# Patient Record
Sex: FEMALE | Race: WHITE
[De-identification: names, ages, dates, MRNs, and addresses within clinical notes are randomized per-mention and may not be internally consistent; named-entity substitution may affect disease eponyms.]

---

## 2017-03-25 ENCOUNTER — HOSPITAL ENCOUNTER (EMERGENCY)
Dept: HOSPITAL 60 - LB.ED | Age: 52
Discharge: HOME | End: 2017-03-25
Payer: MEDICARE

## 2017-03-25 VITALS — DIASTOLIC BLOOD PRESSURE: 87 MMHG | SYSTOLIC BLOOD PRESSURE: 151 MMHG

## 2017-03-25 DIAGNOSIS — Z90.710: ICD-10-CM

## 2017-03-25 DIAGNOSIS — Z87.440: ICD-10-CM

## 2017-03-25 DIAGNOSIS — Z88.8: ICD-10-CM

## 2017-03-25 DIAGNOSIS — J01.90: Primary | ICD-10-CM

## 2017-03-25 DIAGNOSIS — F32.9: ICD-10-CM

## 2017-03-25 DIAGNOSIS — Z90.49: ICD-10-CM

## 2017-03-25 DIAGNOSIS — H66.91: ICD-10-CM

## 2017-03-25 DIAGNOSIS — Z87.01: ICD-10-CM

## 2017-03-25 DIAGNOSIS — Z88.6: ICD-10-CM

## 2017-03-25 DIAGNOSIS — F41.9: ICD-10-CM

## 2017-03-25 DIAGNOSIS — J45.901: ICD-10-CM

## 2017-03-25 DIAGNOSIS — F17.210: ICD-10-CM

## 2017-03-25 PROCEDURE — 99283 EMERGENCY DEPT VISIT LOW MDM: CPT

## 2017-03-25 NOTE — EDM.PDOC
ED HPI GENERAL MEDICAL PROBLEM





- General


Stated Complaint: chest congestion


Time Seen by Provider: 17 12:00


Source of Information: Reports: Patient


History Limitations: Reports: No limitations





- History of Present Illness


INITIAL COMMENTS - FREE TEXT/NARRATIVE: 


Pt claims that she has been having nasal congestion and sinus drainage for past 

1 wk. Cough has been getting worse. No fever or chills. Sputum is greenish 

yellow and copious. Hurts in the face. Also she has been complaining of earache 

since yesterday.  No wheezing or shortness of breath. No nausea or vomiting.





Improves with: Reports: None


Worsens with: Reports: None


Associated Symptoms: Reports: cough.  Denies: confusion, chest pain, diaphoresis

, fever/chills, headaches, nausea/vomiting, rash, shortness of breath, syncope, 

weakness


  ** Bilateral Head


Pain Score (Numeric/FACES): 6





- Related Data


 Allergies











Allergy/AdvReac Type Severity Reaction Status Date / Time


 


acetaminophen [From Percocet] Allergy  Numbness Verified 16 17:01


 


adhesive tape Allergy  Rash Verified 16 17:01


 


ibuprofen Allergy  Stomach Verified 16 17:01





   Upset  


 


ketorolac tromethamine Allergy  Nausea and Verified 16 17:01





[From Toradol]   Vomiting  


 


oxycodone HCl [From Percocet] Allergy  Numbness Verified 16 17:01


 


celecoxib [From Celebrex] AdvReac Mild Indigestion Verified 16 17:01











Home Meds: 


 Home Meds





Ipratropium/Albuterol Sulfate [Duoneb 0.5 MG-3 MG/3 ML] 1 - 2 puff INH Q4H PRN 

13 [History]


Omeprazole 20 mg PO BID 13 [History]


risperiDONE [RisperiDAL M-Tab] 3 mg PO DAILY 13 [History]


traZODone 1 tab PO BEDTIME 13 [History]


Acetaminophen [Tylenol Extra Strength] 500 mg PO Q6H 11/21/15 [History]


Budesonide/Formoterol [Symbicort 160-4.5 MCG] 2 puff INH BID 11/21/15 [History]


Colestipol [Colestipol HCl] 1 - 2 tab PO BID PRN 11/21/15 [History]


Gabapentin [Neurontin] 1 tab PO BID 11/21/15 [History]


Nicotine [Habitrol] 1 patch TOP DAILY 11/21/15 [History]


OXcarbazepine [Trileptal] 900 mg PO BID 11/21/15 [History]


QUEtiapine [SEROquel] 200 tab PO BID 11/21/15 [History]


SUMAtriptan [Imitrex] 1 tab PO ASDIRECTED PRN 11/21/15 [History]


Spironolactone [Aldactone] 3 tab PO BID 11/21/15 [History]


Topiramate 1 tab PO DAILY 11/21/15 [History]


buPROPion [Wellbutrin SR] 100 mg PO BID 11/21/15 [History]


hydrOXYzine HCl [Atarax] 1 - 2 cap PO ASDIRECTED PRN 11/21/15 [History]


rOPINIRole [Requip] 1 tab PO BEDTIME 11/21/15 [History]


tiZANidine [Zanaflex] 4 mg PO Q6H 16 [History]


Zolpidem Tartrate [Ambien Cr] 2 tab PO DAILY 17 [History]











Past Medical History


HEENT History: Reports: Impaired vision, Other (see below)


Other HEENT History: glasses for reading,


Respiratory History: Reports: Asthma, Bronchitis, recurrent, Pneumonia, 

recurrent, Sleep apnea, Other (see below)


Other Respiratory History: Emphysema


Gastrointestinal History: Reports: Irritable bowel syndrome


Genitourinary History: Reports: UTI, recurrent


OB/GYN History: Reports: Dysfunctional uterine bleeding, Pregnancy


Musculoskeletal History: Reports: Back pain, chronic, Other (see below)


Other Musculoskeletal History: back / butt pain, shoots down left leg


Neurological History: Reports: Migraines


Psychiatric History: Reports: Anxiety, Depression, Suicide attempt, Suicidal 

ideation


Hematologic History: Reports: Blood transfusion(s), Iron deficiency


Dermatologic History: Reports: Eczema, Other (see below)


Other Dermatologic History: Allergy to paper tape





- Infectious Disease History


Infectious Disease History: Reports: Chicken pox, Measles, Rubella





- Past Surgical History


HEENT Surgical History: Reports: Other (see below)


Other HEENT Surgeries/Procedures: Eye Surgery, when 7 yr and grade school


GI Surgical History: Reports: Appendectomy, Cholecystectomy, Hernia, abdominal, 

Other (see below)


Other GI Surgeries/Procedures: ABD. SURGERY AND SCAR TISSUE REMOVAL


Female  Surgical History: Reports:  section, Hysterectomy


Other Female  Surgeries/Procedures: 3 





Social & Family History





- Family History


Family Medical History: Noncontributory





- Tobacco Use


Smoking Status *Q: Current Every Day Smoker


Years of Tobacco use: 35


Packs/Tins Daily: 1


Used Tobacco, but Quit: No


Second Hand Smoke Exposure: Yes





- Caffeine Use


Caffeine Use: Reports: Coffee, Tea





- Alcohol Use


Days Per Week of Alcohol Use: 0





- Recreational Drug Use


Recreational Drug Use: No





ED ROS GENERAL





- Review of Systems


Review Of Systems: See Below


Constitutional: Denies: fever, chills


HEENT: Reports: Ear pain, Rhinitis, Sinus problem, Throat pain.  Denies: 

Contact Lenses, Dental pain, Glasses, Hearing loss


Respiratory: Reports: Cough, Sputum.  Denies: Shortness of Breath


Cardiovascular: Denies: Chest pain, Lightheadedness


GI/Abdominal: Denies: Abdominal pain, Nausea, Vomiting


: Denies: discharge, dysuria, flank pain, frequency


Musculoskeletal: Denies: shoulder pain, joint pain, joint swelling


Skin: Denies: pruritis, rash


Neurological: Denies: Confusion, Dizziness, Difficulty Walking, Weakness


Psychiatric: Denies: Agitation, Anxiety





ED EXAM, GENERAL





- Physical Exam


Exam: See Below


Exam Limited By: No limitations


General Appearance: alert, WD/WN, no apparent distress


Eye Exam: bilateral eye: EOMI, PERRL


Ears: normal external exam, normal canal, hearing grossly normal, normal TMs


Ear Exam: right ear: erythema, bilateral ear: auricle normal, canal normal, TM 

bulging


Nose: normal inspection, normal mucosa, no blood


Throat/Mouth: Normal inspection, Normal lips, Normal teeth, Normal gums, Normal 

oropharynx, Normal voice, No airway compromise


Head: atraumatic, normocephalic, sinus tenderness (maxillary tenderness B/L)


Neck: normal inspection, supple, non-tender, full range of motion


Respiratory/Chest: no respiratory distress, lungs clear, normal breath sounds, 

no accessory muscle use, chest non-tender


Cardiovascular: normal peripheral pulses, regular rate, rhythm, no edema, no 

gallop, no JVD, no murmur, no rub





Course





- Vital Signs


Text/Narrative:: 


Pt has acute maxillary sinusitis with right middle ear infection. As her 

symptoms have been going on for 1 wk, I have started her on amox 500mg 3 times 

daily for 10 days. Advised to stop smoking. Steam inhalations 2-3 times daily. 

rest and hydration. 





Followup with primary provider in clinic if symptoms worsen.





Last Recorded V/S: 





 Last Vital Signs











Temp  98.9 F   17 12:13


 


Pulse  92   17 12:13


 


Resp      


 


BP  151/87 H  17 12:13


 


Pulse Ox      














Departure





- Departure


Time of Disposition: 12:30


Disposition: Home, Self-Care 01


Condition: good


Clinical Impression: 


 Acute sinusitis, Right otitis media





Instructions:  Sinusitis, Adult, Sinus Headache


Referrals: 


PCP,None [Primary Care Provider] - 


Care Plan Goals: 


Take antibiotic as prescribed, return to clinic if symptoms persist.





- Problem List & Annotations


(1) Acute sinusitis


SNOMED Code(s): 76773426


   Code(s): J01.90 - ACUTE SINUSITIS, UNSPECIFIED   Status: Acute   





(2) Right otitis media


SNOMED Code(s): 56414699


   Code(s): H66.91 - OTITIS MEDIA, UNSPECIFIED, RIGHT EAR   Status: Acute   





- Problem List Review


Problem List Initiated/Reviewed/Updated: Yes





- Assessment/Plan


Assessment:: 


Acute sinusitis with right OM





Plan: 


Pt has acute maxillary sinusitis with right middle ear infection. As her 

symptoms have been going on for 1 wk, I have started her on amox 500mg 3 times 

daily for 10 days. Advised to stop smoking. Steam inhalations 2-3 times daily. 

rest and hydration. 





Followup with primary provider in clinic if symptoms worsen.

## 2017-05-20 ENCOUNTER — HOSPITAL ENCOUNTER (EMERGENCY)
Dept: HOSPITAL 60 - LB.ED | Age: 52
Discharge: HOME | End: 2017-05-20
Payer: MEDICARE

## 2017-05-20 VITALS — SYSTOLIC BLOOD PRESSURE: 172 MMHG | DIASTOLIC BLOOD PRESSURE: 84 MMHG

## 2017-05-20 DIAGNOSIS — Z90.710: ICD-10-CM

## 2017-05-20 DIAGNOSIS — F17.210: ICD-10-CM

## 2017-05-20 DIAGNOSIS — F32.9: ICD-10-CM

## 2017-05-20 DIAGNOSIS — Z90.49: ICD-10-CM

## 2017-05-20 DIAGNOSIS — Z91.09: ICD-10-CM

## 2017-05-20 DIAGNOSIS — Z79.899: ICD-10-CM

## 2017-05-20 DIAGNOSIS — Z98.890: ICD-10-CM

## 2017-05-20 DIAGNOSIS — J45.909: ICD-10-CM

## 2017-05-20 DIAGNOSIS — K08.89: Primary | ICD-10-CM

## 2017-05-20 DIAGNOSIS — Z88.5: ICD-10-CM

## 2017-05-20 DIAGNOSIS — F41.9: ICD-10-CM

## 2017-05-20 DIAGNOSIS — Z88.8: ICD-10-CM

## 2017-05-20 PROCEDURE — 99283 EMERGENCY DEPT VISIT LOW MDM: CPT

## 2017-05-20 PROCEDURE — 36415 COLL VENOUS BLD VENIPUNCTURE: CPT

## 2017-05-20 PROCEDURE — 85025 COMPLETE CBC W/AUTO DIFF WBC: CPT

## 2017-05-20 PROCEDURE — 99282 EMERGENCY DEPT VISIT SF MDM: CPT

## 2017-05-20 NOTE — EDM.PDOC
ED HPI GENERAL MEDICAL PROBLEM





- General


Chief Complaint: General


Stated Complaint: MOUTH PAIN


Time Seen by Provider: 17 21:00


Source of Information: Reports: Patient


History Limitations: Reports: No Limitations





- History of Present Illness


INITIAL COMMENTS - FREE TEXT/NARRATIVE: 


According to patient she has been having pain in her lower jaw teeh for past 3-

4 days now. She did see  in the clinic and was started on augmentin. Pt 

claims she has been using orogel , but her pain has got worse. Rates at 8-9/10. 

No fever or chills. No facial swelling. Pt claims she is taking 2 tablets of 

tylenol every 6 hrs and it is not helping. Pt claims she is allergic to 

ibuprofen, toradol and  percoet, but not to vicodin. 





Onset Date: 17


Location: Reports: Other (tooth)


Quality: Reports: Ache


Severity: Severe


Improves with: Reports: None


Worsens with: Reports: None


Context: Reports: Trauma


Associated Symptoms: Denies: Chest Pain, Cough, Diaphoresis, Fever/Chills, 

Nausea/Vomiting, Rash, Syncope


  ** Lower Gums


Pain Score (Numeric/FACES): 10





- Related Data


 Allergies











Allergy/AdvReac Type Severity Reaction Status Date / Time


 


adhesive tape Allergy  Rash Verified 17 21:33


 


ibuprofen Allergy  Stomach Verified 17 21:33





   Upset  


 


ketorolac tromethamine Allergy  Nausea and Verified 17 21:33





[From Toradol]   Vomiting  


 


oxycodone HCl [From Percocet] Allergy  Numbness Verified 17 21:33


 


celecoxib [From Celebrex] AdvReac Mild Indigestion Verified 17 21:33











Home Meds: 


 Home Meds





Ipratropium/Albuterol Sulfate [Duoneb 0.5 MG-3 MG/3 ML] 1 - 2 puff INH Q4H PRN 

13 [History]


Omeprazole 20 mg PO BID 13 [History]


risperiDONE [RisperiDAL M-Tab] 3 mg PO DAILY 13 [History]


traZODone 1 tab PO BEDTIME 13 [History]


Acetaminophen [Tylenol Extra Strength] 500 mg PO Q6H 11/21/15 [History]


Budesonide/Formoterol [Symbicort 160-4.5 MCG] 2 puff INH BID 11/21/15 [History]


Colestipol [Colestipol HCl] 1 - 2 tab PO BID PRN 11/21/15 [History]


Gabapentin [Neurontin] 1 tab PO BID 11/21/15 [History]


Nicotine [Habitrol] 1 patch TOP DAILY 11/21/15 [History]


OXcarbazepine [Trileptal] 900 mg PO BID 11/21/15 [History]


QUEtiapine [SEROquel] 200 tab PO BID 11/21/15 [History]


SUMAtriptan [Imitrex] 1 tab PO ASDIRECTED PRN 11/21/15 [History]


Spironolactone [Aldactone] 3 tab PO BID 11/21/15 [History]


Topiramate 1 tab PO DAILY 11/21/15 [History]


buPROPion [Wellbutrin SR] 100 mg PO BID 11/21/15 [History]


hydrOXYzine HCl [Atarax] 1 - 2 cap PO ASDIRECTED PRN 11/21/15 [History]


rOPINIRole [Requip] 1 tab PO BEDTIME 11/21/15 [History]


tiZANidine [Zanaflex] 4 mg PO Q6H 16 [History]


Zolpidem Tartrate [Ambien Cr] 2 tab PO DAILY 17 [History]











Past Medical History


HEENT History: Reports: Impaired Vision, Other (See Below)


Other HEENT History: glasses for reading,


Respiratory History: Reports: Asthma, Bronchitis, Recurrent, Pneumonia, 

Recurrent, Sleep Apnea, Other (See Below)


Other Respiratory History: Emphysema


Gastrointestinal History: Reports: Irritable Bowel Syndrome


Genitourinary History: Reports: UTI, Recurrent


OB/GYN History: Reports: Dysfunctional Uterine Bleeding, Pregnancy


Musculoskeletal History: Reports: Back Pain, Chronic, Other (See Below)


Other Musculoskeletal History: back / butt pain, shoots down left leg


Neurological History: Reports: Migraines


Psychiatric History: Reports: Anxiety, Depression, Suicide Attempt, Suicidal 

Ideation


Hematologic History: Reports: Blood Transfusion(s), Iron Deficiency


Dermatologic History: Reports: Eczema, Other (See Below)


Other Dermatologic History: Allergy to paper tape





- Infectious Disease History


Infectious Disease History: Reports: Chicken Pox, Measles, Rubella





- Past Surgical History


HEENT Surgical History: Reports: Other (See Below)


GI Surgical History: Reports: Appendectomy, Cholecystectomy, Hernia, Abdominal, 

Other (See Below)


Female  Surgical History: Reports:  Section, Hysterectomy





Social & Family History





- Family History


Family Medical History: Noncontributory





- Tobacco Use


Smoking Status *Q: Current Every Day Smoker


Years of Tobacco use: 35


Packs/Tins Daily: 1


Used Tobacco, but Quit: No


Second Hand Smoke Exposure: Yes





- Caffeine Use


Caffeine Use: Reports: Coffee, Tea





- Alcohol Use


Days Per Week of Alcohol Use: 0





- Recreational Drug Use


Recreational Drug Use: No





ED ROS GENERAL





- Review of Systems


Review Of Systems: See Below


Constitutional: Denies: Fever, Chills


HEENT: Denies: Ear Pain, Rhinitis, Sinus Problem, Throat Pain, Throat Swelling, 

Vertigo


Respiratory: Denies: Shortness of Breath, Cough, Sputum


Cardiovascular: Denies: Chest Pain, Edema, Lightheadedness


GI/Abdominal: Denies: Abdominal Pain, Nausea, Vomiting


: Denies: Dysuria, Flank Pain


Musculoskeletal: Denies: Joint Pain, Joint Swelling





ED EXAM, GENERAL





- Physical Exam


Exam: See Below


Exam Limited By: No Limitations


General Appearance: Alert, WD/WN, No Apparent Distress, Mild Distress


Eye Exam: Bilateral Eye: EOMI, PERRL


Ears: Normal External Exam, Normal Canal, Hearing Grossly Normal, Normal TMs


Ear Exam: Bilateral Ear: Auricle Normal, Canal Normal, TM normal


Nose: Normal Inspection, Normal Mucosa, No Blood


Throat/Mouth: Normal Lips, Normal Gums, Normal Oropharynx, Other (pt has 

several teeth lost in all 4 quadrants of the jaw. She does have few caries 

tooth with rilling in the right lower jaw. tender to tapping. Gums appears 

normal. Tender over the right mandible to pressure. No abscess felt or seen.)


Respiratory/Chest: No Respiratory Distress, Lungs Clear, Normal Breath Sounds, 

No Accessory Muscle Use, Chest Non-Tender


Cardiovascular: Normal Peripheral Pulses, Regular Rate, Rhythm, No Edema, No 

Gallop, No JVD, No Murmur, No Rub





Course





- Vital Signs


Text/Narrative:: 


CBC shows whit count of 6.1. I really do not see any source of infection. With 

all the caries tooth, she might have nerve exposure causing her pain. I have 

advised patient to continue antibiotics. Also advised to alternate tylenol with 

Vicodin 5/325 every 4 hrs. Given 6 tqabs fo Vicodin today. Advised lysterine 

gargles 3-4 times daily. Advised to call her dentist office and monday for 

recheck.





Last Recorded V/S: 


 Last Vital Signs











Temp  97.2 F   17 20:40


 


Pulse  91   17 20:40


 


Resp  18   17 20:40


 


BP  172/84 H  17 20:40


 


Pulse Ox  99   17 20:40














- Orders/Labs/Meds


Labs: 


 Laboratory Tests











  17 Range/Units





  21:25 


 


WBC  6.1  (4.0-11.0)  K/uL


 


RBC  4.84  (3.80-5.80)  M/uL


 


Hgb  13.1  (11.5-16.5)  g/dL


 


Hct  39.7  (37.0-47.0)  %


 


MCV  82  (76-96)  fL


 


MCH  27.1  (27.0-32.0)  pg


 


MCHC  33.0  (31.0-35.0)  g/dL


 


RDW  14.8  (11.0-16.0)  %


 


Plt Count  144 L  (150-500)  K/uL


 


MPV  11.5 H  (6.0-10.0)  fL


 


Neut % (Auto)  40.5 L  (45.0-70.0)  %


 


Lymph % (Auto)  50.5 H  (20.0-40.0)  %


 


Mono % (Auto)  7.3  (3.0-10.0)  %


 


Eos % (Auto)  1.0  (1.0-5.0)  %


 


Baso % (Auto)  0.7 H  (0.0-0.5)  %


 


Neut # (Auto)  2.46  (2.00-7.50)  K/uL


 


Lymph # (Auto)  3.06  (1.50-4.00)  K/uL


 


Mono # (Auto)  0.44  (0.20-0.80)  K/uL


 


Eos # (Auto)  0.06  (0.04-0.40)  K/uL


 


Baso # (Auto)  0.04  (0.02-0.10)  K/uL











Meds: 


Medications














Discontinued Medications














Generic Name Dose Route Start Last Admin





  Trade Name Freq  PRN Reason Stop Dose Admin


 


Hydrocodone Bitart/Acetaminophen  Confirm  17 21:17  





  Norco 325-10 Mg  Administered  17 21:18  





  Dose   





  6 tab   





  .ROUTE   





  .STK-MED ONE   














Departure





- Departure


Time of Disposition: 21:50


Disposition: Home, Self-Care 01


Condition: fair


Clinical Impression: 


 Pain of tooth socket








- Discharge Information


Instructions:  Dental Abscess, Easy-to-Read


Referrals: 


PCP,None [Primary Care Provider] - 


Forms:  ED Department Discharge





- Problem List & Annotations


(1) Pain of tooth socket


SNOMED Code(s): 141937895


   Code(s): K08.89 - OTHER SPECIFIED DISORDERS OF TEETH AND SUPPORTING 

STRUCTURES   Status: Acute   Current Visit: Yes   





- Problem List Review


Problem List Initiated/Reviewed/Updated: Yes





- Assessment/Plan


Assessment:: 


Acute tooth infection with pain





Plan: 


CBC shows whit count of 6.1. I really do not see any source of infection. With 

all the caries tooth, she might have nerve exposure causing her pain. I have 

advised patient to continue antibiotics. Also advised to alternate tylenol with 

Vicodin 5/325 every 4 hrs. Given 6 tqabs fo Vicodin today. Advised lysterine 

gargles 3-4 times daily. Advised to call her dentist office and monday for 

recheck.

## 2017-05-22 ENCOUNTER — HOSPITAL ENCOUNTER (EMERGENCY)
Dept: HOSPITAL 60 - LB.ED | Age: 52
Discharge: HOME | End: 2017-05-22
Payer: MEDICARE

## 2017-05-22 VITALS — SYSTOLIC BLOOD PRESSURE: 124 MMHG | DIASTOLIC BLOOD PRESSURE: 77 MMHG

## 2017-05-22 DIAGNOSIS — F32.9: ICD-10-CM

## 2017-05-22 DIAGNOSIS — Z90.49: ICD-10-CM

## 2017-05-22 DIAGNOSIS — G43.909: ICD-10-CM

## 2017-05-22 DIAGNOSIS — J45.909: ICD-10-CM

## 2017-05-22 DIAGNOSIS — Z88.8: ICD-10-CM

## 2017-05-22 DIAGNOSIS — E66.9: ICD-10-CM

## 2017-05-22 DIAGNOSIS — E11.9: ICD-10-CM

## 2017-05-22 DIAGNOSIS — K13.79: Primary | ICD-10-CM

## 2017-05-22 DIAGNOSIS — J43.9: ICD-10-CM

## 2017-05-22 DIAGNOSIS — F41.9: ICD-10-CM

## 2017-05-22 DIAGNOSIS — Z79.899: ICD-10-CM

## 2017-05-22 DIAGNOSIS — F17.210: ICD-10-CM

## 2017-05-25 ENCOUNTER — HOSPITAL ENCOUNTER (EMERGENCY)
Dept: HOSPITAL 60 - LB.ED | Age: 52
Discharge: LEFT BEFORE DISCHARGE | End: 2017-05-25
Payer: MEDICARE

## 2017-05-25 DIAGNOSIS — Z53.21: Primary | ICD-10-CM

## 2017-05-26 NOTE — EDM.PDOC
ED HPI GENERAL MEDICAL PROBLEM





- General


Chief Complaint: ENT Problem


Stated Complaint: MOUTH PAIN


Time Seen by Provider: 17 23:02


Source of Information: Reports: Patient


History Limitations: Reports: No Limitations





- History of Present Illness


INITIAL COMMENTS - FREE TEXT/NARRATIVE: 





This is a 53yo F presenting to the clinic for continued mouth pain. Patient 

states the pain is persistent and has not changed. She cries with the pain. 

Patient states she received hydrocodone and it helped with the pain. Patient is 

unable to tolerate Toradol and NSAIDS. Patient states she is taking two extra 

strength tylenol four times a day. She appears comfortable but when discussing 

the pain breaks out in tears. 


Duration: Week(s):


Location: Reports: Face


Quality: Reports: Ache


Improves with: Reports: None


Worsens with: Reports: None


Treatments PTA: Reports: Acetaminophen





- Related Data


 Allergies











Allergy/AdvReac Type Severity Reaction Status Date / Time


 


adhesive tape Allergy  Rash Verified 17 21:33


 


ibuprofen Allergy  Stomach Verified 17 21:33





   Upset  


 


ketorolac tromethamine Allergy  Nausea and Verified 17 21:33





[From Toradol]   Vomiting  


 


oxycodone HCl [From Percocet] Allergy  Numbness Verified 17 21:33


 


celecoxib [From Celebrex] AdvReac Mild Indigestion Verified 17 21:33











Home Meds: 


 Home Meds





Ipratropium/Albuterol Sulfate [Duoneb 0.5 MG-3 MG/3 ML] 1 - 2 puff INH Q4H PRN 

13 [History]


Omeprazole 20 mg PO BID 13 [History]


risperiDONE [RisperiDAL M-Tab] 3 mg PO DAILY 13 [History]


traZODone 1 tab PO BEDTIME 13 [History]


Acetaminophen [Tylenol Extra Strength] 500 mg PO Q6H 11/21/15 [History]


Budesonide/Formoterol [Symbicort 160-4.5 MCG] 2 puff INH BID 11/21/15 [History]


Colestipol [Colestipol HCl] 1 - 2 tab PO BID PRN 11/21/15 [History]


Gabapentin [Neurontin] 1 tab PO BID 11/21/15 [History]


Nicotine [Habitrol] 1 patch TOP DAILY 11/21/15 [History]


OXcarbazepine [Trileptal] 900 mg PO BID 11/21/15 [History]


QUEtiapine [SEROquel] 200 tab PO BID 11/21/15 [History]


SUMAtriptan [Imitrex] 1 tab PO ASDIRECTED PRN 11/21/15 [History]


Spironolactone [Aldactone] 3 tab PO BID 11/21/15 [History]


Topiramate 1 tab PO DAILY 11/21/15 [History]


buPROPion [Wellbutrin SR] 100 mg PO BID 11/21/15 [History]


hydrOXYzine HCl [Atarax] 1 - 2 cap PO ASDIRECTED PRN 11/21/15 [History]


rOPINIRole [Requip] 1 tab PO BEDTIME 11/21/15 [History]


tiZANidine [Zanaflex] 4 mg PO Q6H 16 [History]


Zolpidem Tartrate [Ambien Cr] 2 tab PO DAILY 17 [History]











Past Medical History


HEENT History: Reports: Impaired Vision, Other (See Below)


Other HEENT History: glasses for reading,


Respiratory History: Reports: Asthma, Bronchitis, Recurrent, Pneumonia, 

Recurrent, Sleep Apnea, Other (See Below)


Other Respiratory History: Emphysema


Gastrointestinal History: Reports: Irritable Bowel Syndrome


Genitourinary History: Reports: UTI, Recurrent


OB/GYN History: Reports: Dysfunctional Uterine Bleeding, Pregnancy


Musculoskeletal History: Reports: Back Pain, Chronic, Other (See Below)


Other Musculoskeletal History: back / butt pain, shoots down left leg


Neurological History: Reports: Migraines


Psychiatric History: Reports: Anxiety, Depression, Suicide Attempt, Suicidal 

Ideation


Endocrine/Metabolic History: Reports: Diabetes, Type II, Obesity/BMI 30+


Hematologic History: Reports: Blood Transfusion(s), Iron Deficiency


Dermatologic History: Reports: Eczema, Other (See Below)


Other Dermatologic History: Allergy to paper tape





- Infectious Disease History


Infectious Disease History: Reports: Chicken Pox, Measles, Rubella





- Past Surgical History


HEENT Surgical History: Reports: Other (See Below)


GI Surgical History: Reports: Appendectomy, Cholecystectomy, Hernia, Abdominal, 

Other (See Below)


Female  Surgical History: Reports:  Section, Hysterectomy





Social & Family History





- Family History


Family Medical History: Noncontributory





- Tobacco Use


Smoking Status *Q: Current Every Day Smoker


Years of Tobacco use: 30


Packs/Tins Daily: 0.5


Used Tobacco, but Quit: No


Tobacco Use Comment: states has information on quitting


Second Hand Smoke Exposure: No





- Caffeine Use


Caffeine Use: Reports: Coffee, Soda, Tea





- Alcohol Use


Days Per Week of Alcohol Use: 0





- Recreational Drug Use


Recreational Drug Use: No





ED ROS ENT





- Review of Systems


Review Of Systems: ROS reveals no pertinent complaints other than HPI.





ED EXAM, ENT





- Physical Exam


Exam: See Below


Exam Limited By: No Limitations


General Appearance: Alert, WD/WN, No Apparent Distress


Eye Exam: Bilateral Eye: EOMI, PERRL


Ears: Normal External Exam


Mouth/Throat: Other (poor dentition)


Head: Atraumatic, Normocephalic


Neck: Normal Inspection, Supple, Non-Tender


Respiratory/Chest: No Respiratory Distress


Cardiovascular: Normal Peripheral Pulses





Course





- Vital Signs


Last Recorded V/S: 


 Last Vital Signs











Temp  36.6 C   17 22:47


 


Pulse  83   17 22:47


 


Resp  16   17 22:47


 


BP  124/77   17 22:47


 


Pulse Ox  97   17 22:47














Departure





- Departure


Time of Disposition: 23:30


Disposition: Home, Self-Care 01


Condition: good


Clinical Impression: 


 Mouth pain








- Discharge Information


Instructions:  Dental Abscess, Easy-to-Read, Pregabalin capsules


Referrals: 


PCP,None [Primary Care Provider] - 


Forms:  ED Department Discharge


Care Plan Goals: 


Take Lyrica 75 mg two x day for 3 or 4 days until you go to the dentist.  Hold 

gabapentin until then.

## 2017-06-11 ENCOUNTER — HOSPITAL ENCOUNTER (OUTPATIENT)
Dept: HOSPITAL 60 - LB.ED | Age: 52
Setting detail: OBSERVATION
LOS: 1 days | Discharge: HOME | End: 2017-06-12
Attending: FAMILY MEDICINE | Admitting: FAMILY MEDICINE
Payer: MEDICARE

## 2017-06-11 DIAGNOSIS — F32.9: ICD-10-CM

## 2017-06-11 DIAGNOSIS — Z88.8: ICD-10-CM

## 2017-06-11 DIAGNOSIS — Z86.19: ICD-10-CM

## 2017-06-11 DIAGNOSIS — G47.30: ICD-10-CM

## 2017-06-11 DIAGNOSIS — K56.60: Primary | ICD-10-CM

## 2017-06-11 DIAGNOSIS — Z79.899: ICD-10-CM

## 2017-06-11 DIAGNOSIS — G43.909: ICD-10-CM

## 2017-06-11 DIAGNOSIS — J45.909: ICD-10-CM

## 2017-06-11 DIAGNOSIS — Z91.048: ICD-10-CM

## 2017-06-11 DIAGNOSIS — E66.9: ICD-10-CM

## 2017-06-11 DIAGNOSIS — Z90.49: ICD-10-CM

## 2017-06-11 DIAGNOSIS — E11.9: ICD-10-CM

## 2017-06-11 DIAGNOSIS — K58.9: ICD-10-CM

## 2017-06-11 DIAGNOSIS — F41.9: ICD-10-CM

## 2017-06-11 DIAGNOSIS — Z90.710: ICD-10-CM

## 2017-06-11 DIAGNOSIS — Z87.440: ICD-10-CM

## 2017-06-11 DIAGNOSIS — J43.9: ICD-10-CM

## 2017-06-11 DIAGNOSIS — Z91.5: ICD-10-CM

## 2017-06-11 DIAGNOSIS — Z88.6: ICD-10-CM

## 2017-06-11 DIAGNOSIS — F17.210: ICD-10-CM

## 2017-06-11 DIAGNOSIS — Z87.01: ICD-10-CM

## 2017-06-11 DIAGNOSIS — D50.9: ICD-10-CM

## 2017-06-11 PROCEDURE — 74000: CPT

## 2017-06-11 PROCEDURE — 36415 COLL VENOUS BLD VENIPUNCTURE: CPT

## 2017-06-11 PROCEDURE — 99217: CPT

## 2017-06-11 PROCEDURE — 96374 THER/PROPH/DIAG INJ IV PUSH: CPT

## 2017-06-11 PROCEDURE — 96376 TX/PRO/DX INJ SAME DRUG ADON: CPT

## 2017-06-11 PROCEDURE — 99285 EMERGENCY DEPT VISIT HI MDM: CPT

## 2017-06-11 PROCEDURE — 99219: CPT

## 2017-06-11 PROCEDURE — 80048 BASIC METABOLIC PNL TOTAL CA: CPT

## 2017-06-11 PROCEDURE — G0378 HOSPITAL OBSERVATION PER HR: HCPCS

## 2017-06-11 PROCEDURE — 80053 COMPREHEN METABOLIC PANEL: CPT

## 2017-06-11 PROCEDURE — 96375 TX/PRO/DX INJ NEW DRUG ADDON: CPT

## 2017-06-11 PROCEDURE — 96361 HYDRATE IV INFUSION ADD-ON: CPT

## 2017-06-11 PROCEDURE — 85025 COMPLETE CBC W/AUTO DIFF WBC: CPT

## 2017-06-11 RX ADMIN — LORAZEPAM PRN MG: 2 INJECTION INTRAMUSCULAR; INTRAVENOUS at 19:12
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
## 2017-06-12 VITALS — SYSTOLIC BLOOD PRESSURE: 100 MMHG | DIASTOLIC BLOOD PRESSURE: 54 MMHG

## 2017-06-12 RX ADMIN — LORAZEPAM PRN MG: 2 INJECTION INTRAMUSCULAR; INTRAVENOUS at 03:08

## 2017-06-12 NOTE — CR
DATE OF SERVICE:  06/11/17

CLINICAL DATA:  abdominal pain



SUPINE AND UPRIGHT ABDOMEN:



There are multiple gas-filled distended loops of small bowel in the mid and 
lower abdomen that contain air-fluid levels consistent with small bowel 
obstruction.  



No free air.  



There are surgical clips in the right upper quadrant.  There are calcifications 
within the pelvis that are most likely vascular.  



No other significant findings.  



[id number]
[facility], [place]

## 2017-06-12 NOTE — CR
DATE OF SERVICE:  06/12/17

CLINICAL DATA:  abdominal pain



FRONTAL VIEW OF THE ABDOMEN:



The view is labeled upright.  The hemidiaphragms are not included.  There is 
gas and stool present throughout the colon on today's exam.  There is some 
residual small bowel gas, however, the gas-filled loops of small bowel appear 
less distended than on the prior study.  



No other interval changes.  



[id number]
[facility]

## 2017-06-12 NOTE — PCM.DCSUM1
**Discharge Summary





- Hospital Course


Free Text/Narrative:: 


Pt's CBC and CMP was normal. Her Abdominal xray showed multiple air fluid 

levels in the colon and as she did have diffuse abdominal pain, pt was admitted 

with colonic obstruction. She was managed conservatively, with IV fluids and 

NPO. Pt was started on normal saline 125cc/hr. Pt had uneventful night. She did 

have anxiety and as she could not take oral meds, she was started on ativan 1mg 

TID as needed. 





Today morning patient claims that she feels fine. She has been passing flatus. 

Abdominal pain is better. No fever or chills. Her CBC and BMP are normal, she 

is well hydrated. Her abdominal xray today shows clearing of all air-fluid 

levels and normal nonspecific gas pattern.





Pt is feeling better, I have discharge patient today. Advised clear liquid diet 

and soft diet for 24 hrs. Should have bowel movement in next 24 hrs. Pt does 

want to go home. Plan is to discharge her today. Advised to followup with Dr. Portillo in next 1-2 days for recheck. Dr. Portillo has been notified.














Brief History: Pt presented with 3 days history of abominal pain, which has got 

worse with abdominal bloating and cramping. Did start to feels nasuea today and 

hence was in the Emergency room. Workup shows colonic obstruction with multiple 

airfluid levels. Admitted for conservative management of bowel obstuciton. For 

details see H&P.





- Discharge Data


Discharge Date: 06/12/17


Discharge Disposition: Home, Self-Care 01


Condition: Stable





- Discharge Diagnosis/Problem(s)


(1) Colonic obstruction


SNOMED Code(s): 17546460


   ICD Code: K56.60 - UNSPECIFIED INTESTINAL OBSTRUCTION   Status: Acute   

Current Visit: Yes   





- Patient Instructions


Diet: Clear Liquid Diet, GI Soft/Low Residue/Low Fiber


Fluid Restriction: 2000 mL


Activity: As Tolerated


Driving: May Drive Today


Showering/Bathing: May Shower


Notify Provider of: Fever, Increased Pain, Nausea and/or Vomiting





- Discharge Plan


Home Medications: 


 Home Meds





Ipratropium/Albuterol Sulfate [Duoneb 0.5 MG-3 MG/3 ML] 1 - 2 puff INH Q4H PRN 

09/29/13 [History]


Omeprazole 20 mg PO BID 09/29/13 [History]


risperiDONE [RisperiDAL M-Tab] 3 mg PO DAILY 09/29/13 [History]


traZODone 1 tab PO BEDTIME 09/29/13 [History]


Acetaminophen [Tylenol Extra Strength] 500 mg PO Q6H 11/21/15 [History]


Budesonide/Formoterol [Symbicort 160-4.5 MCG] 2 puff INH BID 11/21/15 [History]


Colestipol [Colestipol HCl] 1 - 2 tab PO BID PRN 11/21/15 [History]


Gabapentin [Neurontin] 1 tab PO BID 11/21/15 [History]


Nicotine [Habitrol] 1 patch TOP DAILY 11/21/15 [History]


OXcarbazepine [Trileptal] 900 mg PO BID 11/21/15 [History]


QUEtiapine [SEROquel] 200 tab PO BID 11/21/15 [History]


SUMAtriptan [Imitrex] 1 tab PO ASDIRECTED PRN 11/21/15 [History]


Spironolactone [Aldactone] 3 tab PO BID 11/21/15 [History]


Topiramate 1 tab PO DAILY 11/21/15 [History]


buPROPion [Wellbutrin SR] 100 mg PO BID 11/21/15 [History]


hydrOXYzine HCl [hydrOXYzine] 1 - 2 cap PO ASDIRECTED PRN 11/21/15 [History]


rOPINIRole [Requip] 1 tab PO BEDTIME 11/21/15 [History]


tiZANidine [Zanaflex] 4 mg PO Q6H 08/14/16 [History]


Zolpidem Tartrate [Ambien Cr] 2 tab PO DAILY 01/16/17 [History]








Forms:  ED Department Discharge


Referrals: 


PCP,None [Primary Care Provider] - 





- Discharge Summary/Plan Comment


DC Time >30 min.: Yes


Discharge Summary/Plan Comment: 


Clear liquid to soft diet for 1-2 days and resume normal diet.


followup with  in 1-2 days for hospital followup.








- General Info


Date of Service: 06/12/17


Admission Dx/Problem (Free Text: 


Pt claims she feels better, has been passing gas since midnight. Abdominal 

bloating has resolved. Pain has improved rates 2-3/10. No more cramping in the 

lower abdomen. Has been on Iv fluids. No nausea or vomiting. Afebrile





Functional Status: Reports: pain controlled, tolerating diet, urinating





- Review of Systems


General: Denies: Fever, Weakness, Fatigue


HEENT: Denies: dysphasia, sinus congestion, sore throat


Pulmonary: Denies: shortness of breath, hemoptysis, wheezing


Cardiovascular: Denies: Chest Pain, Palpitations, Lightheadedness


Gastrointestinal: Reports: Abdominal pain (improved), Flatus.  Denies: 

Constipation, Diarrhea, Nausea, Vomiting


Genitourinary: Denies: dysuria, frequency


Musculoskeletal: Denies: joint pain, joint swelling


Neurological: Reports: No Symptoms





- Patient Data


Vitals - Most Recent: 


 Last Vital Signs











Temp  97 F   06/12/17 07:32


 


Pulse  65   06/12/17 07:32


 


Resp  20   06/12/17 07:32


 


BP  100/54 L  06/12/17 07:32


 


Pulse Ox  95   06/12/17 07:32











Weight - Most Recent: 101.718 kg


I&O - Last 24 hours: 


 Intake & Output











 06/11/17 06/12/17 06/12/17





 22:59 06:59 14:59


 


Intake Total  1500 


 


Balance  1500 











Lab Results - Last 24 hrs: 


 Laboratory Results - last 24 hr











  06/12/17 06/12/17 Range/Units





  07:15 07:15 


 


WBC  5.3   (4.0-11.0)  K/uL


 


RBC  4.21   (3.80-5.80)  M/uL


 


Hgb  11.3 L   (11.5-16.5)  g/dL


 


Hct  34.8 L   (37.0-47.0)  %


 


MCV  83   (76-96)  fL


 


MCH  26.8 L   (27.0-32.0)  pg


 


MCHC  32.5   (31.0-35.0)  g/dL


 


RDW  14.7   (11.0-16.0)  %


 


Plt Count  140 L   (150-500)  K/uL


 


MPV  11.0 H   (6.0-10.0)  fL


 


Neut % (Auto)  34.5 L   (45.0-70.0)  %


 


Lymph % (Auto)  54.0 H   (20.0-40.0)  %


 


Mono % (Auto)  10.0   (3.0-10.0)  %


 


Eos % (Auto)  1.1   (1.0-5.0)  %


 


Baso % (Auto)  0.4   (0.0-0.5)  %


 


Neut # (Auto)  1.83 L   (2.00-7.50)  K/uL


 


Lymph # (Auto)  2.87   (1.50-4.00)  K/uL


 


Mono # (Auto)  0.53   (0.20-0.80)  K/uL


 


Eos # (Auto)  0.06   (0.04-0.40)  K/uL


 


Baso # (Auto)  0.02   (0.02-0.10)  K/uL


 


Sodium   139  (136-145)  mmol/L


 


Potassium   3.6  (3.5-5.1)  mmol/L


 


Chloride   106  ()  mmol/L


 


Carbon Dioxide   25.6  (21.0-32.0)  mmol/L


 


Anion Gap   11.0  (5.0-15.0)  mmol/L


 


BUN   13  D  (8-26)  mg/dL


 


Creatinine   0.81  (0.55-1.02)  mg/dL


 


Est Cr Clr Drug Dosing   70.16  mL/min


 


Estimated GFR (MDRD)   > 60  (>60)  MLS/MIN


 


BUN/Creatinine Ratio   16.0  (6-25)  


 


Glucose   124 H  ()  mg/dL


 


Calcium   8.1 L  (8.5-10.1)  mg/dL











Med Orders - Current: 


 Current Medications





Albuterol/Ipratropium (Duoneb 3.0-0.5 Mg/3 Ml)  3 ml INH Q4H PRN


   PRN Reason: Shortness of Breath


Budesonide/Formoterol Fumarate (Symbicort 160-4.5 Mcg)  0 gm INH BID YADIRA


   Last Admin: 06/11/17 19:45 Dose:  Not Given


Sodium Chloride (Normal Saline)  1,000 mls @ 125 mls/hr IV ASDIRECTED YADIRA


   Last Admin: 06/12/17 08:22 Dose:  125 mls/hr


Lorazepam (Ativan)  1 mg IVPUSH Q8H PRN


   PRN Reason: Anxiety


   Last Admin: 06/12/17 03:08 Dose:  1 mg


Miscellaneous Information (Remove Patch)  1 ea TRDERM DAILY YADIRA


Nicotine (Habitrol)  14 mg TOP DAILY YADIRA


Nicotine (Habitrol)  21 mg TRDERM DAILY YADIRA


   Last Admin: 06/11/17 18:20 Dose:  21 mg


Ondansetron HCl (Zofran)  4 mg IV Q6H PRN


   PRN Reason: Nausea/Vomiting


   Last Admin: 06/12/17 00:52 Dose:  4 mg





Discontinued Medications





Nicotine (Habitrol) Confirm Administered Dose 21 mg .ROUTE .STK-MED ONE


   Stop: 06/11/17 17:58


   Last Admin: 06/11/17 18:20 Dose:  Not Given











- Exam


General: Reports: alert, oriented, cooperative


HEENT: Reports: Pupils equal, Pupils reactive, EOMI, Mucous membr. moist/pink


Neck: Reports: supple


Lungs: Reports: Clear to auscultation, Normal respiratory effort


Cardiovascular: Reports: Regular Rate, Regular Rhythm


Abdomen: Reports: bowel sounds present, soft, no distension, tenderness (Mild 

discomfort in the left lower quadrant.)





*Q Meaningful Use (DIS)





- VTE *Q


VTE Criteria *Q: 








- Stroke *Q


Stroke Criteria *Q: 








- AMI *Q


AMI Criteria *Q:

## 2017-07-27 ENCOUNTER — HOSPITAL ENCOUNTER (EMERGENCY)
Dept: HOSPITAL 60 - LB.ED | Age: 52
Discharge: HOME | End: 2017-07-27
Payer: MEDICARE

## 2017-07-27 VITALS — DIASTOLIC BLOOD PRESSURE: 75 MMHG | SYSTOLIC BLOOD PRESSURE: 122 MMHG
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
## 2017-07-27 DIAGNOSIS — S39.012A: Primary | ICD-10-CM

## 2017-07-27 PROCEDURE — 96372 THER/PROPH/DIAG INJ SC/IM: CPT

## 2017-07-27 PROCEDURE — 99283 EMERGENCY DEPT VISIT LOW MDM: CPT

## 2017-08-13 ENCOUNTER — HOSPITAL ENCOUNTER (EMERGENCY)
Dept: HOSPITAL 60 - LB.ED | Age: 52
Discharge: HOME | End: 2017-08-13
Payer: MEDICARE

## 2017-08-13 VITALS — DIASTOLIC BLOOD PRESSURE: 78 MMHG | SYSTOLIC BLOOD PRESSURE: 122 MMHG

## 2017-08-13 DIAGNOSIS — F17.210: ICD-10-CM

## 2017-08-13 DIAGNOSIS — F32.9: ICD-10-CM

## 2017-08-13 DIAGNOSIS — Z87.440: ICD-10-CM

## 2017-08-13 DIAGNOSIS — F41.9: ICD-10-CM

## 2017-08-13 DIAGNOSIS — E66.9: ICD-10-CM

## 2017-08-13 DIAGNOSIS — Z90.710: ICD-10-CM

## 2017-08-13 DIAGNOSIS — J45.909: ICD-10-CM

## 2017-08-13 DIAGNOSIS — Z90.49: ICD-10-CM

## 2017-08-13 DIAGNOSIS — E11.9: ICD-10-CM

## 2017-08-13 DIAGNOSIS — K04.7: Primary | ICD-10-CM

## 2017-08-13 DIAGNOSIS — Z98.890: ICD-10-CM

## 2017-08-13 PROCEDURE — 99282 EMERGENCY DEPT VISIT SF MDM: CPT

## 2017-08-13 NOTE — EDM.PDOC
ED HPI GENERAL MEDICAL PROBLEM





- General


Chief Complaint: General


Stated Complaint: Back pains


Time Seen by Provider: 17 20:25


Source of Information: Reports: Patient


History Limitations: Reports: No Limitations





- History of Present Illness


INITIAL COMMENTS - FREE TEXT/NARRATIVE: 





Patient had a left lower tooth removed 2017.  She has been on an 

antibiotic for 5 days  that has helped her feel better but ibuprofen is making 

her sick to her stomach and she has no tylenol at home and needs to have pain 

relief until she sees her dental surgeon tomorrow.  No fever or chills or other 

complaints.


Onset Date: 17


Onset Time: 16:00


Duration: Day(s): (19)


Location: Reports: Other (Left lower tooth removal site.)


Quality: Reports: Ache


Severity: Moderate


Improves with: Reports: Medication (Ibuprofen but that is causing stomach upset.

)


Worsens with: Reports: None


Context: Reports: Other (Tooth removal on 2017.)


Associated Symptoms: Reports: No Other Symptoms


Treatments PTA: Reports: NSAIDS





- Related Data


 Allergies











Allergy/AdvReac Type Severity Reaction Status Date / Time


 


adhesive tape Allergy  Rash Verified 17 17:10


 


ibuprofen Allergy  Stomach Verified 17 17:10





   Upset  


 


ketorolac tromethamine Allergy  Nausea and Verified 17 17:10





[From Toradol]   Vomiting  


 


oxycodone HCl [From Percocet] Allergy  Numbness Verified 17 17:10


 


celecoxib [From Celebrex] AdvReac Mild Indigestion Verified 17 17:10











Home Meds: 


 Home Meds





Ipratropium/Albuterol Sulfate [Duoneb 0.5 MG-3 MG/3 ML] 1 - 2 puff INH Q4H PRN 

13 [History]


Omeprazole 20 mg PO BID 13 [History]


risperiDONE [RisperiDAL M-Tab] 3 mg PO DAILY 13 [History]


Acetaminophen [Tylenol Extra Strength] 500 mg PO Q6H 11/21/15 [History]


Budesonide/Formoterol [Symbicort 160-4.5 MCG] 2 puff INH BID 11/21/15 [History]


Colestipol [Colestipol HCl] 1 - 2 tab PO BID PRN 11/21/15 [History]


Gabapentin [Neurontin] 1 tab PO BID 11/21/15 [History]


Nicotine [Habitrol] 1 patch TOP DAILY 11/21/15 [History]


OXcarbazepine [Trileptal] 900 mg PO BID 11/21/15 [History]


QUEtiapine [SEROquel] 200 tab PO BID 11/21/15 [History]


SUMAtriptan [Imitrex] 1 tab PO ASDIRECTED PRN 11/21/15 [History]


Spironolactone [Aldactone] 3 tab PO BID 11/21/15 [History]


Topiramate 1 tab PO DAILY 11/21/15 [History]


buPROPion [Wellbutrin SR] 100 mg PO BID 11/21/15 [History]


hydrOXYzine HCl [hydrOXYzine] 1 - 2 cap PO ASDIRECTED PRN 11/21/15 [History]


rOPINIRole [Requip] 1 tab PO BEDTIME 11/21/15 [History]


tiZANidine [Zanaflex] 4 mg PO Q6H 16 [History]


Zolpidem Tartrate [Ambien Cr] 12.5 tab PO DAILY 17 [History]


Ketorolac [Toradol] 10 mg PO Q6H PRN #16 tab 17 [Rx]


Methocarbamol [Robaxin-750] 750 mg PO Q6HR #40 tablet 17 [Rx]











Past Medical History


HEENT History: Reports: Impaired Vision, Other (See Below)


Other HEENT History: glasses for reading,


Respiratory History: Reports: Asthma, Bronchitis, Recurrent, Pneumonia, 

Recurrent, Other (See Below)


Other Respiratory History: Emphysema


Gastrointestinal History: Reports: Bowel Obstruction, Irritable Bowel Syndrome


Genitourinary History: Reports: UTI, Recurrent


OB/GYN History: Reports: Dysfunctional Uterine Bleeding, Pregnancy


Musculoskeletal History: Reports: Back Pain, Chronic, Other (See Below)


Other Musculoskeletal History: back / butt pain, shoots down left leg


Neurological History: Reports: Migraines


Psychiatric History: Reports: Anxiety, Depression, Suicide Attempt, Suicidal 

Ideation


Endocrine/Metabolic History: Reports: Diabetes, Type II, Obesity/BMI 30+


Hematologic History: Reports: Blood Transfusion(s), Iron Deficiency


Dermatologic History: Reports: Eczema, Other (See Below)


Other Dermatologic History: Allergy to paper tape





- Infectious Disease History


Infectious Disease History: Reports: Chicken Pox, Measles, Rubella





- Past Surgical History


HEENT Surgical History: Reports: Other (See Below)


GI Surgical History: Reports: Appendectomy, Cholecystectomy, Hernia, Abdominal, 

Other (See Below)


Female  Surgical History: Reports:  Section, Hysterectomy





Social & Family History





- Family History


Family Medical History: Noncontributory





- Tobacco Use


Smoking Status *Q: Current Every Day Smoker


Years of Tobacco use: 35


Packs/Tins Daily: 0.5


Used Tobacco, but Quit: No


Second Hand Smoke Exposure: No





- Caffeine Use


Caffeine Use: Reports: Coffee, Tea





- Alcohol Use


Days Per Week of Alcohol Use: 0





- Recreational Drug Use


Recreational Drug Use: No





ED ROS GENERAL





- Review of Systems


Review Of Systems: See Below


Constitutional: Reports: No Symptoms


HEENT: Reports: Other (Tooth pain.)


Respiratory: Reports: No Symptoms


Cardiovascular: Reports: No Symptoms


Endocrine: Reports: No Symptoms


GI/Abdominal: Reports: No Symptoms


: Reports: No Symptoms


Musculoskeletal: Reports: No Symptoms


Skin: Reports: No Symptoms


Neurological: Reports: No Symptoms


Psychiatric: Reports: No Symptoms


Hematologic/Lymphatic: Reports: No Symptoms


Immunologic: Reports: No Symptoms





ED EXAM, GENERAL





- Physical Exam


Exam: See Below


Exam Limited By: No Limitations


General Appearance: Alert, WD/WN, No Apparent Distress


Eye Exam: Bilateral Eye: EOMI, Normal Fundi, Normal Inspection, PERRL


Ears: Normal External Exam, Normal Canal, Hearing Grossly Normal, Normal TMs


Ear Exam: Bilateral Ear: Auricle Normal, Canal Normal, TM normal


Nose: Normal Inspection, Normal Mucosa, No Blood


Throat/Mouth: Other (Left lower tooth removal site is draining normally and 

healing.)


Head: Atraumatic, Normocephalic


Neck: Normal Inspection, Supple, Non-Tender, Full Range of Motion


Respiratory/Chest: No Respiratory Distress, Lungs Clear, Normal Breath Sounds, 

No Accessory Muscle Use, Chest Non-Tender


Cardiovascular: Normal Peripheral Pulses, Regular Rate, Rhythm, No Edema, No 

Gallop, No JVD, No Murmur, No Rub


GI/Abdominal: Normal Bowel Sounds, Soft, Non-Tender, No Organomegaly, No 

Distention, No Abnormal Bruit, No Mass


Extremities: Normal Inspection, Normal Range of Motion, Non-Tender, Normal 

Capillary Refill, No Pedal Edema


Neurological: Alert, Oriented, CN II-XII Intact, Normal Cognition, Normal Gait, 

Normal Reflexes, No Motor/Sensory Deficits


Psychiatric: Normal Affect, Normal Mood


Skin Exam: Warm, Dry, Intact, Normal Color, No Rash





Course





- Vital Signs


Text/Narrative:: 





Uneventful ED course. She was given Tramadol 50 mg po q 4 hours, #10, no 

refills.  Recheck with oral surgeon/dentist tomorrow.





Departure





- Departure


Time of Disposition: 20:45


Disposition: Home, Self-Care 01


Condition: Good


Clinical Impression: 


 Dental abscess, Pain of tooth socket








- Discharge Information


Forms:  ED Department Discharge

## 2017-09-10 ENCOUNTER — HOSPITAL ENCOUNTER (EMERGENCY)
Dept: HOSPITAL 60 - LB.ED | Age: 52
Discharge: HOME | End: 2017-09-10
Payer: MEDICARE

## 2017-09-10 VITALS — DIASTOLIC BLOOD PRESSURE: 65 MMHG | SYSTOLIC BLOOD PRESSURE: 142 MMHG

## 2017-09-10 DIAGNOSIS — Z88.8: ICD-10-CM

## 2017-09-10 DIAGNOSIS — M79.604: ICD-10-CM

## 2017-09-10 DIAGNOSIS — Z88.6: ICD-10-CM

## 2017-09-10 DIAGNOSIS — Z90.710: ICD-10-CM

## 2017-09-10 DIAGNOSIS — Z90.49: ICD-10-CM

## 2017-09-10 DIAGNOSIS — G43.909: ICD-10-CM

## 2017-09-10 DIAGNOSIS — E66.9: ICD-10-CM

## 2017-09-10 DIAGNOSIS — F32.9: ICD-10-CM

## 2017-09-10 DIAGNOSIS — Z79.899: ICD-10-CM

## 2017-09-10 DIAGNOSIS — M79.605: Primary | ICD-10-CM

## 2017-09-10 DIAGNOSIS — Z87.01: ICD-10-CM

## 2017-09-10 DIAGNOSIS — Z88.5: ICD-10-CM

## 2017-09-10 DIAGNOSIS — F41.9: ICD-10-CM

## 2017-09-10 DIAGNOSIS — J45.909: ICD-10-CM

## 2017-09-10 PROCEDURE — 96372 THER/PROPH/DIAG INJ SC/IM: CPT

## 2017-09-10 PROCEDURE — 99283 EMERGENCY DEPT VISIT LOW MDM: CPT

## 2017-09-10 PROCEDURE — 73590 X-RAY EXAM OF LOWER LEG: CPT

## 2017-09-11 NOTE — CR
DATE OF SERVICE:  09/10/17

CLINICAL DATA:  leg pain



LEFT LOWER LEG:  Normal exam.  



RIGHT LOWER LEG:  Normal exam. 



[id number]
MTDD

## 2017-12-10 ENCOUNTER — HOSPITAL ENCOUNTER (EMERGENCY)
Dept: HOSPITAL 60 - LB.ED | Age: 52
Discharge: HOME | End: 2017-12-10
Payer: MEDICARE

## 2017-12-10 VITALS — SYSTOLIC BLOOD PRESSURE: 126 MMHG | DIASTOLIC BLOOD PRESSURE: 64 MMHG

## 2017-12-10 DIAGNOSIS — Z88.8: ICD-10-CM

## 2017-12-10 DIAGNOSIS — F17.210: ICD-10-CM

## 2017-12-10 DIAGNOSIS — Z79.899: ICD-10-CM

## 2017-12-10 DIAGNOSIS — M62.838: Primary | ICD-10-CM

## 2017-12-10 PROCEDURE — 99283 EMERGENCY DEPT VISIT LOW MDM: CPT

## 2017-12-10 PROCEDURE — 96372 THER/PROPH/DIAG INJ SC/IM: CPT

## 2017-12-10 NOTE — EDM.PDOC
ED HPI GENERAL MEDICAL PROBLEM





- General


Chief Complaint: General


Stated Complaint: LEFT SHOULDER BLADE PAIN


Time Seen by Provider: 12/10/17 17:10


Source of Information: Reports: Patient


History Limitations: Reports: No Limitations





- History of Present Illness


INITIAL COMMENTS - FREE TEXT/NARRATIVE: 





According to patient, she claims she has been having neck and upper back pain 

since saturday.Pt claims that he woke up with pain in the neck Saturday morning

, the pain radiates into the shoulder, left side worse than the right. Pt 

claims she has tried her pain meds and tylenol with no relief. No tingling or 

numbness no weakness in the upper extremities. rates her pain around 7-8/10.  

No other complaints.


Duration: Day(s): (2), Getting Worse, Waxing/Waning


Quality: Reports: Ache


Severity: Moderate


Associated Symptoms: Denies: Confusion, Chest Pain, Fever/Chills, Nausea/

Vomiting, Rash, Shortness of Breath, Weakness


  ** Bilateral Shoulder


Pain Score (Numeric/FACES): 9





- Related Data


 Allergies











Allergy/AdvReac Type Severity Reaction Status Date / Time


 


adhesive tape Allergy  Rash Verified 12/10/17 17:10


 


celecoxib [From Celebrex] Allergy  Nausea and Verified 12/10/17 17:10





   Vomiting  











Home Meds: 


 Home Meds





Ipratropium/Albuterol Sulfate [Duoneb 0.5 MG-3 MG/3 ML] 1 - 2 puff INH Q4H PRN 

13 [History]


Omeprazole 20 mg PO ACBREAKFAST 13 [History]


risperiDONE [RisperiDAL M-Tab] 2 mg PO QPM 13 [History]


Acetaminophen [Tylenol Extra Strength] 500 mg PO Q6H 11/21/15 [History]


Budesonide/Formoterol [Symbicort 160-4.5 MCG] 2 puff INH BID 11/21/15 [History]


Colestipol [Colestipol HCl] 1 - 2 tab PO BID PRN 11/21/15 [History]


Gabapentin [Neurontin] 1 tab PO TID 11/21/15 [History]


Nicotine [Habitrol] 1 patch TOP DAILY 11/21/15 [History]


QUEtiapine [SEROquel] 100 tab PO QAM 11/21/15 [History]


Spironolactone [Aldactone] 3 tab PO BID 11/21/15 [History]


buPROPion [Wellbutrin SR] 100 mg PO BID 11/21/15 [History]


hydrOXYzine HCl [hydrOXYzine] 1 - 3 cap PO TID 11/21/15 [History]


rOPINIRole [Requip] 1.5 tab PO BEDTIME 11/21/15 [History]


tiZANidine [Zanaflex] 4 mg PO Q6H 16 [History]


Zolpidem Tartrate [Ambien Cr] 12.5 tab PO QPM 17 [History]


ClonazePAM [KlonoPIN] 1 mg PO BID 12/10/17 [History]


QUEtiapine Fumarate [Quetiapine Fumarate] 400 mg PO QPM 12/10/17 [History]


traZODone HCl [Trazodone HCl] 100 mg PO QPM 12/10/17 [History]











Past Medical History


HEENT History: Reports: Impaired Vision, Other (See Below)


Other HEENT History: glasses for reading,


Respiratory History: Reports: Asthma, Bronchitis, Recurrent, Pneumonia, 

Recurrent, Other (See Below)


Other Respiratory History: Emphysema


Gastrointestinal History: Reports: Bowel Obstruction, GERD, Irritable Bowel 

Syndrome


Genitourinary History: Reports: UTI, Recurrent


OB/GYN History: Reports: Dysfunctional Uterine Bleeding, Pregnancy


Musculoskeletal History: Reports: Back Pain, Chronic, Other (See Below)


Other Musculoskeletal History: back / butt pain, shoots down left leg


Neurological History: Reports: Migraines


Psychiatric History: Reports: Anxiety, Depression, Suicide Attempt, Suicidal 

Ideation


Endocrine/Metabolic History: Reports: Diabetes, Type II, Obesity/BMI 30+


Hematologic History: Reports: Blood Transfusion(s), Iron Deficiency


Dermatologic History: Reports: Eczema, Other (See Below)


Other Dermatologic History: Allergy to paper tape





- Infectious Disease History


Infectious Disease History: Reports: Chicken Pox, Measles, Rubella





- Past Surgical History


GI Surgical History: Reports: Appendectomy, Cholecystectomy, Hernia, Abdominal, 

Other (See Below)


Female  Surgical History: Reports:  Section, Hysterectomy





Social & Family History





- Family History


Family Medical History: Noncontributory





- Tobacco Use


Smoking Status *Q: Current Every Day Smoker


Years of Tobacco use: 35


Packs/Tins Daily: 0.5


Used Tobacco, but Quit: No


Second Hand Smoke Exposure: No





- Caffeine Use


Caffeine Use: Reports: Coffee, Tea





- Alcohol Use


Days Per Week of Alcohol Use: 0





- Recreational Drug Use


Recreational Drug Use: No





ED ROS GENERAL





- Review of Systems


Review Of Systems: See Below


Constitutional: Reports: Decreased Appetite.  Denies: Fever, Chills


HEENT: Denies: Rhinitis, Throat Pain, Throat Swelling


Respiratory: Denies: Cough, Sputum


Cardiovascular: Denies: Chest Pain, Lightheadedness


Endocrine: Denies: Fatigue


GI/Abdominal: Denies: Abdominal Pain, Nausea, Vomiting


: Denies: Dysuria, Flank Pain


Musculoskeletal: Reports: Neck Pain, Shoulder Pain.  Denies: Joint Pain, Joint 

Swelling


Skin: Denies: Bruising, Pruritis, Rash


Neurological: Denies: Numbness, Tingling, Weakness





ED EXAM, GENERAL





- Physical Exam


Exam: See Below


Exam Limited By: No Limitations


General Appearance: Alert, WD/WN, Mild Distress


Eye Exam: Bilateral Eye: EOMI, PERRL


Ears: Normal External Exam, Normal Canal, Hearing Grossly Normal, Normal TMs


Ear Exam: Bilateral Ear: Auricle Normal, Canal Normal, TM normal


Nose: Normal Inspection, Normal Mucosa, No Blood


Throat/Mouth: Normal Inspection, Normal Lips, Normal Teeth, Normal Gums, Normal 

Oropharynx, Normal Voice, No Airway Compromise


Head: Atraumatic, Normocephalic


Neck: Normal Inspection, Supple, Full Range of Motion, Tender Lateral (oer the 

paraspinal muscles of the neck, left worse than right)


Respiratory/Chest: No Respiratory Distress, Lungs Clear, Normal Breath Sounds, 

No Accessory Muscle Use, Chest Non-Tender


Cardiovascular: Normal Peripheral Pulses, Regular Rate, Rhythm, No Edema, No 

Gallop, No JVD, No Murmur, No Rub


Back Exam: Normal Inspection, Full Range of Motion, Muscle Spasm (interscapular 

and suprascapular regions of the back)





Course





- Vital Signs


Text/Narrative:: 


Pt reassured that she has muscle spasms of the upper back and neck from 

probably sleeping wrongly on the pillow. she did receive toradol 60mg Im and 

oral robaxin 1500mg in the emergency room.  Pt advised intermittent heat 3-4 

times daily. toradol 10mg 3 times daily. Avoid motrin or alleve with it. 

Robaxin 1500mg at bedtime.  If not better in 3-5 days advised to followup with 

her PCP in clinic.





Last Recorded V/S: 


 Last Vital Signs











Temp  97.0 F   12/10/17 17:10


 


Pulse  83   12/10/17 17:10


 


Resp  20   12/10/17 17:10


 


BP  126/64   12/10/17 17:10


 


Pulse Ox  98   12/10/17 17:10














- Orders/Labs/Meds


Meds: 


Medications














Discontinued Medications














Generic Name Dose Route Start Last Admin





  Trade Name Maite  PRN Reason Stop Dose Admin


 


Ketorolac Tromethamine  Confirm  12/10/17 17:36  12/10/17 17:46





  Toradol  Administered  12/10/17 17:37  Not Given





  Dose   





  60 mg   





  .ROUTE   





  .STK-MED ONE   


 


Ketorolac Tromethamine  60 mg  12/10/17 17:37  12/10/17 17:42





  Toradol  IM  12/10/17 17:38  60 mg





  ONETIME ONE   Administration


 


Methocarbamol  Confirm  12/10/17 17:37  12/10/17 17:46





  Robaxin  Administered  12/10/17 17:38  Not Given





  Dose   





  1,500 mg   





  .ROUTE   





  .STK-MED ONE   


 


Methocarbamol  1,500 mg  12/10/17 17:40  12/10/17 17:40





  Robaxin  PO  12/10/17 17:41  1,500 mg





  ONETIME ONE   Administration














Departure





- Departure


Time of Disposition: 17:45


Disposition: Home, Self-Care 01


Condition: Good


Clinical Impression: 


 Neck muscle spasm








- Discharge Information


Instructions:  Muscle Cramps and Spasms, Easy-to-Read, Ketorolac tablets, 

Methocarbamol tablets


Referrals: 


PCP,None [Primary Care Provider] - 


Forms:  ED Department Discharge


Additional Instructions: 


Take Robaxin 1500mg every night


Take Toradol 1 tablet three times a day with meals.


Do Not take Ibuprofen while on Tordaol. Tylenol is OK.


Intermittent heat for 15 min 3-4 times a day.


Getting a massage could help as well.





- Problem List & Annotations


(1) Neck muscle spasm


SNOMED Code(s): 109749762073


   Code(s): M62.838 - OTHER MUSCLE SPASM   Status: Acute   Current Visit: Yes   





- Problem List Review


Problem List Initiated/Reviewed/Updated: Yes





- Assessment/Plan


Assessment:: 


Neck and upper back muscle spasm





Plan: 


Pt reassured that she has muscle spasms of the upper back and neck from 

probably sleeping wrongly on the pillow. she did receive toradol 60mg Im and 

oral robaxin 1500mg in the emergency room.  Pt advised intermittent heat 3-4 

times daily. toradol 10mg 3 times daily. Avoid motrin or alleve with it. 

Robaxin 1500mg at bedtime.  If not better in 3-5 days advised to followup with 

her PCP in clinic.

## 2018-01-23 ENCOUNTER — HOSPITAL ENCOUNTER (EMERGENCY)
Dept: HOSPITAL 60 - LB.ED | Age: 53
Discharge: HOME | End: 2018-01-23
Payer: MEDICARE

## 2018-01-23 VITALS — DIASTOLIC BLOOD PRESSURE: 82 MMHG | SYSTOLIC BLOOD PRESSURE: 168 MMHG

## 2018-01-23 DIAGNOSIS — Z88.1: ICD-10-CM

## 2018-01-23 DIAGNOSIS — Z91.09: ICD-10-CM

## 2018-01-23 DIAGNOSIS — K29.70: Primary | ICD-10-CM

## 2018-01-23 DIAGNOSIS — J45.909: ICD-10-CM

## 2018-01-23 DIAGNOSIS — F17.210: ICD-10-CM

## 2018-01-23 DIAGNOSIS — K21.9: ICD-10-CM

## 2018-01-23 DIAGNOSIS — E11.9: ICD-10-CM

## 2018-01-23 DIAGNOSIS — E66.9: ICD-10-CM

## 2018-01-23 DIAGNOSIS — Z79.899: ICD-10-CM

## 2018-01-23 PROCEDURE — A0429 BLS-EMERGENCY: HCPCS

## 2018-01-23 PROCEDURE — 80053 COMPREHEN METABOLIC PANEL: CPT

## 2018-01-23 PROCEDURE — A0425 GROUND MILEAGE: HCPCS

## 2018-01-23 PROCEDURE — 36415 COLL VENOUS BLD VENIPUNCTURE: CPT

## 2018-01-23 PROCEDURE — 99284 EMERGENCY DEPT VISIT MOD MDM: CPT

## 2018-01-23 PROCEDURE — 85025 COMPLETE CBC W/AUTO DIFF WBC: CPT

## 2018-01-23 PROCEDURE — 96372 THER/PROPH/DIAG INJ SC/IM: CPT

## 2018-01-23 NOTE — EDM.PDOC
ED HPI GENERAL MEDICAL PROBLEM





- General


Chief Complaint: General


Stated Complaint: abdominal pain


Time Seen by Provider: 18 20:00


Source of Information: Reports: Patient, EMS, RN


History Limitations: Reports: No Limitations





- History of Present Illness


INITIAL COMMENTS - FREE TEXT/NARRATIVE: 


52 yr female presents to ER from ambulance with abdominal pain and reflux.  

States she had spaghetti tonight and pain to upper abdomen started after that.  

States no gallbladder and hasn't taken any night medications.  She is alert and 

lying on the stretcher and filling out paper forms.  States nausea and diarrhea 

yesterday and today.  States hx of IBS and had abdominal surgery about 3 yr 

ago.  





Onset: Today


Onset Date: 18


Onset Time: 17:30


Location: Reports: Abdomen


  ** Abdominal


Pain Score (Numeric/FACES): 9





- Related Data


 Allergies











Allergy/AdvReac Type Severity Reaction Status Date / Time


 


adhesive tape Allergy  Rash Verified 18 20:39


 


celecoxib [From Celebrex] Allergy  Nausea and Verified 18 20:39





   Vomiting  











Home Meds: 


 Home Meds





Ipratropium/Albuterol Sulfate [Duoneb 0.5 MG-3 MG/3 ML] 1 - 2 puff INH Q4H PRN 

13 [History]


Omeprazole 20 mg PO ACBREAKFAST 13 [History]


risperiDONE [RisperiDAL M-Tab] 2 mg PO QPM 13 [History]


Acetaminophen [Tylenol Extra Strength] 500 mg PO Q6H 11/21/15 [History]


Budesonide/Formoterol [Symbicort 160-4.5 MCG] 2 puff INH BID 11/21/15 [History]


Gabapentin [Neurontin] 1 tab PO TID 11/21/15 [History]


QUEtiapine [SEROquel] 100 tab PO QAM 11/21/15 [History]


Spironolactone [Aldactone] 3 tab PO BID 11/21/15 [History]


buPROPion [Wellbutrin SR] 100 mg PO BID 11/21/15 [History]


hydrOXYzine HCl [hydrOXYzine] 1 - 3 cap PO TID 11/21/15 [History]


rOPINIRole [Requip] 1.5 tab PO BEDTIME 11/21/15 [History]


tiZANidine [Zanaflex] 4 mg PO Q6H 16 [History]


Zolpidem Tartrate [Ambien Cr] 12.5 tab PO QPM 17 [History]


ClonazePAM [KlonoPIN] 1 mg PO BID 12/10/17 [History]


QUEtiapine Fumarate [Quetiapine Fumarate] 400 mg PO QPM 12/10/17 [History]


traZODone HCl [Trazodone HCl] 100 mg PO QPM 12/10/17 [History]











Past Medical History


HEENT History: Reports: Impaired Vision, Other (See Below)


Other HEENT History: glasses for reading,


Respiratory History: Reports: Asthma, Bronchitis, Recurrent, Pneumonia, 

Recurrent, Other (See Below)


Other Respiratory History: Emphysema


Gastrointestinal History: Reports: Bowel Obstruction, GERD, Irritable Bowel 

Syndrome


Genitourinary History: Reports: UTI, Recurrent


OB/GYN History: Reports: Dysfunctional Uterine Bleeding, Pregnancy


Musculoskeletal History: Reports: Back Pain, Chronic, Other (See Below)


Other Musculoskeletal History: back / butt pain, shoots down left leg


Neurological History: Reports: Migraines


Psychiatric History: Reports: Anxiety, Depression, Suicide Attempt, Suicidal 

Ideation


Endocrine/Metabolic History: Reports: Diabetes, Type II, Obesity/BMI 30+


Hematologic History: Reports: Blood Transfusion(s), Iron Deficiency


Dermatologic History: Reports: Eczema, Other (See Below)


Other Dermatologic History: Allergy to paper tape





- Infectious Disease History


Infectious Disease History: Reports: Chicken Pox, Measles, Rubella





- Past Surgical History


GI Surgical History: Reports: Appendectomy, Cholecystectomy, Hernia, Abdominal, 

Other (See Below)


Female  Surgical History: Reports:  Section, Hysterectomy





Social & Family History





- Family History


Family Medical History: Noncontributory





- Tobacco Use


Smoking Status *Q: Current Every Day Smoker


Years of Tobacco use: 35


Packs/Tins Daily: 0.5


Used Tobacco, but Quit: No


Second Hand Smoke Exposure: No





- Caffeine Use


Caffeine Use: Reports: Coffee, Tea





- Alcohol Use


Days Per Week of Alcohol Use: 0





- Recreational Drug Use


Recreational Drug Use: No





ED ROS GENERAL





- Review of Systems


Review Of Systems: See Below


Constitutional: Reports: Chills, Decreased Appetite


HEENT: Reports: No Symptoms


Respiratory: Reports: No Symptoms


Cardiovascular: Reports: No Symptoms


GI/Abdominal: Reports: Abdominal Pain, Diarrhea, Nausea


: Reports: No Symptoms


Musculoskeletal: Reports: Leg Pain


Hematologic/Lymphatic: Reports: No Symptoms





ED EXAM, GENERAL





- Physical Exam


Exam: See Below


Exam Limited By: No Limitations


General Appearance: Alert, No Apparent Distress


Head: Atraumatic, Normocephalic


Neck: Supple, Non-Tender


Respiratory/Chest: No Respiratory Distress, Normal Breath Sounds


Cardiovascular: Regular Rate, Rhythm


GI/Abdominal: Normal Bowel Sounds, Soft, Tender.  No: Guarding, Rigid


Neurological: Alert, Oriented


Psychiatric: Normal Affect, Normal Mood


Skin Exam: Warm, Dry, Normal Color





Course





- Vital Signs


Last Recorded V/S: 


 Last Vital Signs











Temp  97.5 F   18 19:50


 


Pulse  91   18 19:50


 


Resp  20   18 19:50


 


BP  168/82 H  18 19:50


 


Pulse Ox  98   18 19:50














- Orders/Labs/Meds


Labs: 


 Laboratory Tests











  18 Range/Units





  20:46 20:46 


 


WBC  6.4   (4.0-11.0)  K/uL


 


RBC  4.98   (3.80-5.80)  M/uL


 


Hgb  13.4   (11.5-16.5)  g/dL


 


Hct  40.0   (37.0-47.0)  %


 


MCV  80   (76-96)  fL


 


MCH  26.9 L   (27.0-32.0)  pg


 


MCHC  33.5   (31.0-35.0)  g/dL


 


RDW  14.8   (11.0-16.0)  %


 


Plt Count  169   (150-500)  K/uL


 


MPV  11.1 H   (6.0-10.0)  fL


 


Neut % (Auto)  65.9   (45.0-70.0)  %


 


Lymph % (Auto)  22.5   (20.0-40.0)  %


 


Mono % (Auto)  10.6 H   (3.0-10.0)  %


 


Eos % (Auto)  0.8 L   (1.0-5.0)  %


 


Baso % (Auto)  0.2   (0.0-0.5)  %


 


Neut # (Auto)  4.19   (2.00-7.50)  K/uL


 


Lymph # (Auto)  1.43 L   (1.50-4.00)  K/uL


 


Mono # (Auto)  0.67   (0.20-0.80)  K/uL


 


Eos # (Auto)  0.05   (0.04-0.40)  K/uL


 


Baso # (Auto)  0.01 L   (0.02-0.10)  K/uL


 


Sodium   135 L  (136-145)  mmol/L


 


Potassium   3.6  (3.5-5.1)  mmol/L


 


Chloride   97 L  ()  mmol/L


 


Carbon Dioxide   22.8  (21.0-32.0)  mmol/L


 


Anion Gap   18.8 H  (5.0-15.0)  mmol/L


 


BUN   18  D  (8-26)  mg/dL


 


Creatinine   1.26 H  (0.55-1.02)  mg/dL


 


Est Cr Clr Drug Dosing   45.10  mL/min


 


Estimated GFR (MDRD)   45 L  (>60)  MLS/MIN


 


BUN/Creatinine Ratio   14.3  (6-25)  


 


Glucose   178 H D  ()  mg/dL


 


Calcium   8.3 L  (8.5-10.1)  mg/dL


 


Total Bilirubin   0.4  D  (0.0-1.0)  mg/dL


 


AST   30  (15-37)  U/L


 


ALT   40  (12-78)  U/L


 


Alkaline Phosphatase   80  ()  U/L


 


Total Protein   7.6  (6.4-8.2)  g/dL


 


Albumin   3.7  (3.4-5.0)  g/dL


 


Globulin   3.9  (2.2-4.2)  g/dL


 


Albumin/Globulin Ratio   0.9  (0.8-2.0)  











Meds: 


Medications














Discontinued Medications














Generic Name Dose Route Start Last Admin





  Trade Name Freq  PRN Reason Stop Dose Admin


 


Al Hydroxide/Mg Hydroxide  30 ml  18 20:18  





  Gi Cocktail  PO  18 20:19  





  ONETIME ONE   


 


Ondansetron HCl  4 mg  18 20:16  





  Zofran  IVPUSH  18 20:17  





  ONETIME ONE   


 


Ondansetron HCl  4 mg  18 20:20  





  Zofran  .XX  18 20:21  





  ONETIME ONE   


 


Ondansetron HCl  4 mg  18 20:10  





  Zofran Odt  PO  18 20:11  





  ONETIME ONE   


 


Promethazine HCl  25 mg  18 21:10  





  Phenergan  IM  18 21:11  





  ONETIME ONE   














- Re-Assessments/Exams


Free Text/Narrative Re-Assessment/Exam: 





18 21:12  Reviewed lab results with pt.  Zofran and GI cocktail given to 

pt.  Will give Phenergan 25 mg IM for nausea.  Pt to rest and clear liquids 

tonight and in am and bland diet tomorrow.  Limit spicy and fatty foods.  RTC 

to PCP for review of treatment for gastritis and possibly upper GI scope if 

indicated.  Pt to be discharge to care of self with help of boyfriend and 

family.








Departure





- Departure


Time of Disposition: 21:15


Disposition: Home, Self-Care 01


Condition: Good


Clinical Impression: 


 Gastritis








- Discharge Information


Forms:  ED Department Discharge

## 2018-01-24 ENCOUNTER — HOSPITAL ENCOUNTER (INPATIENT)
Dept: HOSPITAL 60 - LB.ED | Age: 53
LOS: 5 days | Discharge: HOME | DRG: 390 | End: 2018-01-29
Attending: FAMILY MEDICINE | Admitting: FAMILY MEDICINE
Payer: MEDICARE

## 2018-01-24 DIAGNOSIS — Z88.8: ICD-10-CM

## 2018-01-24 DIAGNOSIS — M54.9: ICD-10-CM

## 2018-01-24 DIAGNOSIS — J45.20: ICD-10-CM

## 2018-01-24 DIAGNOSIS — Z79.84: ICD-10-CM

## 2018-01-24 DIAGNOSIS — H54.7: ICD-10-CM

## 2018-01-24 DIAGNOSIS — Z91.5: ICD-10-CM

## 2018-01-24 DIAGNOSIS — M85.80: ICD-10-CM

## 2018-01-24 DIAGNOSIS — G43.909: ICD-10-CM

## 2018-01-24 DIAGNOSIS — K56.609: Primary | ICD-10-CM

## 2018-01-24 DIAGNOSIS — K21.9: ICD-10-CM

## 2018-01-24 DIAGNOSIS — F41.9: ICD-10-CM

## 2018-01-24 DIAGNOSIS — G25.81: ICD-10-CM

## 2018-01-24 DIAGNOSIS — F17.210: ICD-10-CM

## 2018-01-24 DIAGNOSIS — Z87.01: ICD-10-CM

## 2018-01-24 DIAGNOSIS — R10.9: ICD-10-CM

## 2018-01-24 DIAGNOSIS — F32.9: ICD-10-CM

## 2018-01-24 DIAGNOSIS — G89.29: ICD-10-CM

## 2018-01-24 DIAGNOSIS — Z23: ICD-10-CM

## 2018-01-24 DIAGNOSIS — Z91.048: ICD-10-CM

## 2018-01-24 DIAGNOSIS — I10: ICD-10-CM

## 2018-01-24 DIAGNOSIS — Z87.440: ICD-10-CM

## 2018-01-24 DIAGNOSIS — E11.9: ICD-10-CM

## 2018-01-24 PROCEDURE — 85025 COMPLETE CBC W/AUTO DIFF WBC: CPT

## 2018-01-24 PROCEDURE — 36415 COLL VENOUS BLD VENIPUNCTURE: CPT

## 2018-01-24 PROCEDURE — 83690 ASSAY OF LIPASE: CPT

## 2018-01-24 PROCEDURE — 80053 COMPREHEN METABOLIC PANEL: CPT

## 2018-01-24 PROCEDURE — G0008 ADMIN INFLUENZA VIRUS VAC: HCPCS

## 2018-01-24 PROCEDURE — 71250 CT THORAX DX C-: CPT

## 2018-01-24 PROCEDURE — G0009 ADMIN PNEUMOCOCCAL VACCINE: HCPCS

## 2018-01-24 PROCEDURE — 83605 ASSAY OF LACTIC ACID: CPT

## 2018-01-24 PROCEDURE — 99285 EMERGENCY DEPT VISIT HI MDM: CPT

## 2018-01-24 PROCEDURE — 74176 CT ABD & PELVIS W/O CONTRAST: CPT

## 2018-01-24 RX ADMIN — KETOROLAC TROMETHAMINE SCH MG: 30 INJECTION, SOLUTION INTRAMUSCULAR at 12:13

## 2018-01-24 RX ADMIN — KETOROLAC TROMETHAMINE SCH MG: 30 INJECTION, SOLUTION INTRAMUSCULAR at 17:30

## 2018-01-24 NOTE — CT
DATE OF SERVICE:  01/24/18

CLINICAL DATA:  abdominal pain



UNENHANCED CHEST CT:



Multislice acquisition through the chest without IV contrast was performed.  



No priors.  



There are multiple small, less than 4 mm, pulmonary nodules noted in both 
lungs.  These are nonspecific in appearance.  The lungs are otherwise clear.  
No areas of consolidation.  No pneumothorax.  No pleural effusions.  



The heart size is normal.  No pericardial effusion.  



No adenopathy.  



There is a 13 mm oval-shaped low attenuation focus adjacent to the anterior 
aortic arch.  I am uncertain of its significance.  No aortic aneurysm.  



No other significant findings.  



IMPRESSION:  

1) Multiple, less than 4 mm, nodules in both lungs.  If the patient is low risk
, no further workup is necessary.  If the patient is high risk, a 12-month 
followup exam is recommended.  

2) A 13 mm low density lesion adjacent to the anterior aortic arch.  
Significance uncertain.  A followup CT scan with contrast enhancement may be 
helpful. 





UNENHANCED ABDOMEN AND PELVIC CT:



Multislice acquisition through the abdomen and pelvis without IV or oral 
contrast was performed. 



There is diffuse fatty infiltration of the liver.  No focal hepatic lesions.  
The gallbladder is absent and there are surgical clips in the gallbladder 
fossa.  No biliary duct dilatation. 



The spleen appears normal.  The pancreas appears normal.  The right and left 
adrenals appear normal.



The right and left kidneys appear normal.  No nephrocalcinosis or 
nephrolithiasis.  No hydronephrosis or hydroureter.  



The bladder is partially fluid-filled and appears normal.  



The appendix is not seen.  No evidence of appendicitis.  



The stomach is gas and fluid-filled and significantly distended.  There are 
multiple fluid and gas-filled distended loops of small bowel throughout the 
abdomen.  These include the duodenum and jejunum.  They contain air-fluid 
levels.  There is a transition zone in the ileum with nondilated loops of ileum 
distally.  These findings are consistent with small bowel obstruction. 



There are surgical changes within the anterior abdominal wall.  



No free air.  No free fluid.  No adenopathy.  No aortic aneurysm.  



The patient is status post hysterectomy.  



IMPRESSION:  Findings consistent with small bowel obstruction.  No free air.  
No free fluid.  Multiple other findings as discussed above.  



The patient's physician was notified of the findings by telephone and by 
[facility] preliminary radiology report. 



[id number] 
[facility]

## 2018-01-24 NOTE — PCM.HP
H&P History of Present Illness





- General


Date of Service: 18


Admit Problem/Dx: 


 Admission Diagnosis/Problem





Admission Diagnosis/Problem      Obstruction of colon








Source of Information: Patient


History Limitations: Reports: No Limitations





- History of Present Illness


Initial Comments - Free Text/Narative: 





This is a 53yo F here for increasing abdominal pain and discomfort. She states 

the symptoms have worsened since yesterday when she was in the ER. Patient has 

vomited a few times but did say she had a small BM yesterday. Patient feels 

chills and has a decreased appetite. 


Onset of Symptoms: Reports: Gradual


Duration of Symptoms: Reports: Day(s):, Getting Worse


Location: Reports: Abdomen


Improves with: Reports: None


Worsens with: Reports: None


Associated Symptoms: Reports: Loss of Appetite, Nausea/Vomiting


  ** Middle Abdomen


Pain Score (Numeric/FACES): 9





- Related Data


Allergies/Adverse Reactions: 


 Allergies











Allergy/AdvReac Type Severity Reaction Status Date / Time


 


adhesive tape Allergy  Rash Verified 18 20:39


 


celecoxib [From Celebrex] Allergy  Nausea and Verified 18 20:39





   Vomiting  











Home Medications: 


 Home Meds





Ipratropium/Albuterol Sulfate [Duoneb 0.5 MG-3 MG/3 ML] 1 - 2 puff INH Q4H PRN 

13 [History]


Omeprazole 20 mg PO ACBREAKFAST 13 [History]


risperiDONE [RisperiDAL M-Tab] 2 mg PO QPM 13 [History]


Acetaminophen [Tylenol Extra Strength] 500 mg PO Q6H 11/21/15 [History]


Budesonide/Formoterol [Symbicort 160-4.5 MCG] 2 puff INH BID 11/21/15 [History]


Gabapentin [Neurontin] 1 tab PO TID 11/21/15 [History]


QUEtiapine [SEROquel] 100 tab PO QAM 11/21/15 [History]


Spironolactone [Aldactone] 3 tab PO BID 11/21/15 [History]


buPROPion [Wellbutrin SR] 100 mg PO BID 11/21/15 [History]


hydrOXYzine HCl [hydrOXYzine] 1 - 3 cap PO TID 11/21/15 [History]


rOPINIRole [Requip] 1.5 tab PO BEDTIME 11/21/15 [History]


tiZANidine [Zanaflex] 4 mg PO Q6H 16 [History]


Zolpidem Tartrate [Ambien Cr] 12.5 tab PO QPM 17 [History]


ClonazePAM [KlonoPIN] 1 mg PO BID 12/10/17 [History]


QUEtiapine Fumarate [Quetiapine Fumarate] 400 mg PO QPM 12/10/17 [History]


traZODone HCl [Trazodone HCl] 100 mg PO QPM 12/10/17 [History]











Past Medical History


HEENT History: Reports: Impaired Vision, Other (See Below)


Other HEENT History: glasses for reading,


Cardiovascular History: Reports: Hypertension


Respiratory History: Reports: Asthma, Bronchitis, Recurrent, Pneumonia, 

Recurrent, Other (See Below)


Other Respiratory History: Emphysema


Gastrointestinal History: Reports: Bowel Obstruction, GERD, Irritable Bowel 

Syndrome


Genitourinary History: Reports: UTI, Recurrent


OB/GYN History: Reports: Dysfunctional Uterine Bleeding, Pregnancy


Musculoskeletal History: Reports: Back Pain, Chronic, Other (See Below)


Other Musculoskeletal History: back / butt pain, shoots down left leg


Neurological History: Reports: Migraines


Psychiatric History: Reports: Anxiety, Depression, Suicide Attempt, Suicidal 

Ideation


Endocrine/Metabolic History: Reports: Diabetes, Type II, Obesity/BMI 30+


Hematologic History: Reports: Blood Transfusion(s), Iron Deficiency


Dermatologic History: Reports: Eczema, Other (See Below)


Other Dermatologic History: Allergy to paper tape





- Infectious Disease History


Infectious Disease History: Reports: Chicken Pox, Measles, Rubella





- Past Surgical History


GI Surgical History: Reports: Appendectomy, Cholecystectomy, Hernia, Abdominal, 

Other (See Below)


Female  Surgical History: Reports:  Section, Hysterectomy





Social & Family History





- Family History


Family Medical History: Noncontributory





- Tobacco Use


Smoking Status *Q: Current Every Day Smoker


Years of Tobacco use: 35


Packs/Tins Daily: 0.5


Used Tobacco, but Quit: No


Second Hand Smoke Exposure: No





- Caffeine Use


Caffeine Use: Reports: Coffee, Soda





- Alcohol Use


Days Per Week of Alcohol Use: 0





- Recreational Drug Use


Recreational Drug Use: No





H&P Review of Systems





- Review of Systems:


Review Of Systems: ROS reveals no pertinent complaints other than HPI.





Exam





- Exam


Exam: See Below





- Vital Signs


Vital Signs: 


 Last Vital Signs











Temp  36.8 C   18 13:32


 


Pulse  87   18 13:32


 


Resp  18   18 13:32


 


BP  142/77 H  18 13:32


 


Pulse Ox  96   18 13:32











Weight: 111.039 kg





- Exam


General: Alert, Oriented


HEENT: PERRLA, Conjunctiva Clear


Neck: Supple, Trachea Midline


Lungs: Clear to Auscultation, Normal Respiratory Effort


Cardiovascular: Regular Rate, Regular Rhythm


GI/Abdominal Exam: Tender, Abnormal Bowel Sounds


Back Exam: Normal Inspection


Extremities: Normal Inspection





- Patient Data


Result Diagrams: 


 18 08:39





 18 08:39





*Q Meaningful Use (ADM)





- VTE *Q


VTE Criteria *Q: 








- Stroke *Q


Stroke Criteria *Q: 








- AMI *Q


AMI Criteria *Q: 








- Problem List


(1) Small bowel obstruction


SNOMED Code(s): 688814276


   ICD Code: K56.609 - UNSP INTESTNL OBST, UNSP AS TO PARTIAL VERSUS COMPLETE 

OBST   Status: Acute   Priority: High   Current Visit: Yes   





(2) HTN (hypertension)


SNOMED Code(s): 75819559


   ICD Code: I10 - ESSENTIAL (PRIMARY) HYPERTENSION   Status: Chronic   Current 

Visit: Yes   


Qualifiers: 


   Hypertension type: essential hypertension   Qualified Code(s): I10 - 

Essential (primary) hypertension   





(3) Migraine


SNOMED Code(s): 37866491


   ICD Code: G43.909 - MIGRAINE, UNSP, NOT INTRACTABLE, WITHOUT STATUS 

MIGRAINOSUS   Status: Chronic   Current Visit: Yes   


Qualifiers: 


   Migraine type: unspecified   Intractability: not intractable 





(4) Osteopenia


SNOMED Code(s): 105800044


   ICD Code: M85.80 - OTH DISRD OF BONE DENSITY AND STRUCTURE, UNSPECIFIED SITE

   Status: Chronic   Current Visit: Yes   


Qualifiers: 


   Osteopenia location: unspecified   Qualified Code(s): M85.80 - Other 

specified disorders of bone density and structure, unspecified site   





(5) Restless leg


SNOMED Code(s): 51784079


   ICD Code: G25.81 - RESTLESS LEGS SYNDROME   Status: Chronic   Current Visit: 

Yes   





(6) GERD (gastroesophageal reflux disease)


SNOMED Code(s): 320548439


   ICD Code: K21.9 - GASTRO-ESOPHAGEAL REFLUX DISEASE WITHOUT ESOPHAGITIS   

Status: Chronic   Current Visit: Yes   





(7) Chronic pain


SNOMED Code(s): 55119346


   ICD Code: G89.29 - OTHER CHRONIC PAIN   Status: Acute   Current Visit: Yes   





(8) Asthma


SNOMED Code(s): 723512033


   ICD Code: J45.909 - UNSPECIFIED ASTHMA, UNCOMPLICATED   Status: Chronic   

Current Visit: Yes   


Qualifiers: 


   Asthma severity: mild   Asthma persistence: intermittent   Asthma 

complication type: uncomplicated   Qualified Code(s): J45.20 - Mild 

intermittent asthma, uncomplicated   





(9) Depression


SNOMED Code(s): 05672817


   ICD Code: F32.9 - MAJOR DEPRESSIVE DISORDER, SINGLE EPISODE, UNSPECIFIED   

Status: Chronic   Current Visit: Yes   


Problem List Initiated/Reviewed/Updated: Yes


Orders Last 24hrs: 


 Active Orders 24 hr











 Category Date Time Status


 


 Ketorolac [Toradol] Med  18 12:00 Active





 15 mg IVPUSH Q6H   


 


 Nicotine [Habitrol] Med  18 08:00 Active





 21 mg TRDERM DAILY   


 


 Sodium Chloride 0.9% [Saline Flush] Med  18 09:34 Active





 10 ml FLUSH ASDIRECTED PRN   


 


 Peripheral IV Insertion Adult [OM.PC] Routine Oth  18 09:34 Ordered








 Medication Orders





Sodium Chloride (Normal Saline)  1,000 mls @ 100 mls/hr IV ASDIRECTED [facility]


   Last Admin: 18 10:00  Dose: 150 mls/hr


Ketorolac Tromethamine (Toradol)  15 mg IVPUSH Q6H [facility]


   Last Admin: 18 12:13  Dose: 15 mg


Nicotine (Habitrol)  21 mg TRDERM DAILY [facility]


Sodium Chloride (Saline Flush)  10 ml FLUSH ASDIRECTED PRN


   PRN Reason: Keep Vein Open








Assessment/Plan Comment:: 





Patient admitted to inpatient for small bowel obstruction. Placed NPO except 

for ice chips and meds. We will help control pain with anti-inflammatories. 


Counseled on conservative management at this time and close monitoring. Patient 

agrees with plan of care. F/u labs in am and re-evaluate.

## 2018-01-25 RX ADMIN — KETOROLAC TROMETHAMINE SCH MG: 30 INJECTION, SOLUTION INTRAMUSCULAR at 17:30

## 2018-01-25 RX ADMIN — ALOGLIPTIN SCH MG: 25 TABLET, FILM COATED ORAL at 11:28

## 2018-01-25 RX ADMIN — KETOROLAC TROMETHAMINE SCH MG: 30 INJECTION, SOLUTION INTRAMUSCULAR at 11:27

## 2018-01-25 RX ADMIN — KETOROLAC TROMETHAMINE SCH: 30 INJECTION, SOLUTION INTRAMUSCULAR at 08:35

## 2018-01-25 RX ADMIN — KETOROLAC TROMETHAMINE SCH: 30 INJECTION, SOLUTION INTRAMUSCULAR at 01:57

## 2018-01-25 RX ADMIN — OMEPRAZOLE SCH MG: 20 CAPSULE, DELAYED RELEASE ORAL at 08:19

## 2018-01-25 RX ADMIN — MOMETASONE FUROATE AND FORMOTEROL FUMARATE DIHYDRATE SCH PUFF: 200; 5 AEROSOL RESPIRATORY (INHALATION) at 20:28

## 2018-01-25 RX ADMIN — ALOGLIPTIN SCH: 25 TABLET, FILM COATED ORAL at 13:43

## 2018-01-25 RX ADMIN — ALOGLIPTIN SCH MG: 25 TABLET, FILM COATED ORAL at 20:28

## 2018-01-25 NOTE — CR
DATE OF SERVICE:  01/25/18

CLINICAL DATA:  bowel obstruction



SUPINE AND UPRIGHT ABDOMEN:



Again noted are multiple gas-filled distended loops of small bowel throughout 
the abdomen with air-fluid levels consistent with small bowel obstruction.



No free air.  



The patient's physician was notified of the findings by [facility] 
preliminary radiology report.  



[id number]
MTDD

## 2018-01-25 NOTE — PCM.PN
- General Info


Date of Service: 01/25/18


Subjective Update: 





Patient has continued abdominal discomfort and nausea. She has improved 

appetite but is still NPO. Patient denies any other concerns but would like 

something more for pain. 





- Review of Systems


General: Reports: Appetite (increase)


HEENT: Reports: No Symptoms


Pulmonary: Reports: No Symptoms


Cardiovascular: Reports: No Symptoms


Gastrointestinal: Reports: Abdominal Pain, Nausea


Genitourinary: Reports: No Symptoms


Musculoskeletal: Reports: No Symptoms


Skin: Reports: No Symptoms


Neurological: Reports: No Symptoms


Psychiatric: Reports: No Symptoms





- Patient Data


Vitals - Most Recent: 


 Last Vital Signs











Temp  36.9 C   01/25/18 13:00


 


Pulse  82   01/25/18 13:00


 


Resp  18   01/25/18 13:00


 


BP  136/72   01/25/18 13:00


 


Pulse Ox  97   01/25/18 13:00











Weight - Most Recent: 111.039 kg


I&O - Last 24 Hours: 


 Intake & Output











 01/25/18 01/25/18 01/25/18





 06:59 14:59 22:59


 


Intake Total  90 


 


Balance  90 











Lab Results Last 24 Hours: 


 Laboratory Results - last 24 hr











  01/25/18 01/25/18 01/25/18 Range/Units





  07:20 07:20 07:20 


 


WBC  4.9  D    (4.0-11.0)  K/uL


 


RBC  4.37    (3.80-5.80)  M/uL


 


Hgb  11.8  D    (11.5-16.5)  g/dL


 


Hct  35.7 L D    (37.0-47.0)  %


 


MCV  82    (76-96)  fL


 


MCH  27.0    (27.0-32.0)  pg


 


MCHC  33.1    (31.0-35.0)  g/dL


 


RDW  14.5    (11.0-16.0)  %


 


Plt Count  150  D    (150-500)  K/uL


 


MPV  11.2 H    (6.0-10.0)  fL


 


Neut % (Auto)  46.9    (45.0-70.0)  %


 


Lymph % (Auto)  39.7    (20.0-40.0)  %


 


Mono % (Auto)  12.2 H    (3.0-10.0)  %


 


Eos % (Auto)  1.0    (1.0-5.0)  %


 


Baso % (Auto)  0.2    (0.0-0.5)  %


 


Neut # (Auto)  2.30    (2.00-7.50)  K/uL


 


Lymph # (Auto)  1.95    (1.50-4.00)  K/uL


 


Mono # (Auto)  0.60    (0.20-0.80)  K/uL


 


Eos # (Auto)  0.05    (0.04-0.40)  K/uL


 


Baso # (Auto)  0.01 L    (0.02-0.10)  K/uL


 


Sodium   139   (136-145)  mmol/L


 


Potassium   3.6   (3.5-5.1)  mmol/L


 


Chloride   103   ()  mmol/L


 


Carbon Dioxide   24.6   (21.0-32.0)  mmol/L


 


Anion Gap   15.0   (5.0-15.0)  mmol/L


 


BUN   17  D   (8-26)  mg/dL


 


Creatinine   1.06 H D   (0.55-1.02)  mg/dL


 


Est Cr Clr Drug Dosing   53.61   mL/min


 


Estimated GFR (MDRD)   54 L   (>60)  MLS/MIN


 


BUN/Creatinine Ratio   16.0   (6-25)  


 


Glucose   132 H D   ()  mg/dL


 


Lactic Acid    0.86 L  (0.90-1.70)  mmol/L


 


Calcium   7.9 L   (8.5-10.1)  mg/dL


 


Total Bilirubin   0.5   (0.0-1.0)  mg/dL


 


AST   24   (15-37)  U/L


 


ALT   32   (12-78)  U/L


 


Alkaline Phosphatase   68   ()  U/L


 


Total Protein   6.1 L   (6.4-8.2)  g/dL


 


Albumin   3.2 L   (3.4-5.0)  g/dL


 


Globulin   2.9   (2.2-4.2)  g/dL


 


Albumin/Globulin Ratio   1.1   (0.8-2.0)  











Med Orders - Current: 


 Current Medications





Acetaminophen (Tylenol Extra Strength)  500 mg PO Q6H [facility]


   Last Admin: 01/25/18 13:18 Dose:  Not Given


Albuterol/Ipratropium (Duoneb 3.0-0.5 Mg/3 Ml)  3 ml INH Q4H PRN


   PRN Reason: Shortness of Breath


Clonazepam (Klonopin)  1 mg PO BID [facility]


   Last Admin: 01/25/18 08:20 Dose:  1 mg


Gabapentin (Neurontin)  800 mg PO TID [facility]


Sodium Chloride (Normal Saline)  1,000 mls @ 100 mls/hr IV ASDIRECTED [facility]


   Last Admin: 01/24/18 10:00 Dose:  150 mls/hr


Promethazine HCl 12.5 mg/ (Sodium Chloride)  50.5 mls @ 200 mls/hr IV Q6H PRN


   PRN Reason: Nausea/Vomiting


   Last Admin: 01/25/18 11:30 Dose:  200 mls/hr


Ketorolac Tromethamine (Toradol)  15 mg IVPUSH Q6H [facility]


   Last Admin: 01/25/18 11:27 Dose:  15 mg


Mometasone Furoate/Formoterol Fumar (Dulera 200-5 Mcg)  2 puff IH BID [facility]


Nicotine (Habitrol)  21 mg TRDERM DAILY [facility]


   Last Admin: 01/25/18 08:43 Dose:  21 mg


Bupropion [ (Wellbutrin Sr] 100mg)  100 mg PO BID [facility]


   Last Admin: 01/25/18 13:43 Dose:  Not Given


Zolpidem Tartrate Cr ([Ambien Cr] 12.5mg)  0 tab PO QPM [facility]


Omeprazole (Omeprazole)  20 mg PO ACBREAKFAST [facility]


   Last Admin: 01/25/18 08:19 Dose:  20 mg


Quetiapine Fumarate (Seroquel)  400 mg PO QPM [facility]


   Last Admin: 01/24/18 21:47 Dose:  400 mg


Quetiapine Fumarate (Seroquel)  100 mg PO QAM [facility]


   Last Admin: 01/25/18 08:20 Dose:  100 mg


Risperidone (Risperidal)  2 mg PO QPM [facility]


Ropinirole HCl (Requip)  1.5 mg PO BEDTIME [facility]


   Last Admin: 01/24/18 19:57 Dose:  1.5 mg


Sodium Chloride (Saline Flush)  10 ml FLUSH ASDIRECTED PRN


   PRN Reason: Keep Vein Open


Spironolactone (Aldactone)  75 mg PO BID [facility]


   Last Admin: 01/25/18 08:19 Dose:  75 mg


Tizanidine HCl (Zanaflex)  4 mg PO Q6H [facility]


   Last Admin: 01/25/18 13:18 Dose:  Not Given


Trazodone HCl (Trazodone)  100 mg PO QPM [facility]


   Last Admin: 01/24/18 19:56 Dose:  100 mg





Discontinued Medications





Gabapentin (Neurontin)  800 mg PO TID [facility]


   Last Admin: 01/25/18 08:29 Dose:  800 mg


Ketorolac Tromethamine (Toradol)  15 mg IVPUSH Q6H PRN


   PRN Reason: PAIN


Ketorolac Tromethamine (Toradol) Confirm Administered Dose 30 mg .ROUTE .STK-

MED ONE


   Stop: 01/24/18 16:45


   Last Admin: 01/24/18 19:40 Dose:  Not Given


Ketorolac Tromethamine (Toradol) Confirm Administered Dose 30 mg .ROUTE .STK-

MED ONE


   Stop: 01/24/18 22:24


   Last Admin: 01/24/18 23:30 Dose:  30 mg


Ketorolac Tromethamine (Toradol) Confirm Administered Dose 30 mg .ROUTE .STK-

MED ONE


   Stop: 01/25/18 04:41


   Last Admin: 01/25/18 04:47 Dose:  30 mg


Nicotine (Habitrol) Confirm Administered Dose 21 mg .ROUTE .STK-MED ONE


   Stop: 01/24/18 16:44


   Last Admin: 01/24/18 16:00 Dose:  21 mg


Nicotine (Habitrol) Confirm Administered Dose 21 mg .ROUTE .STK-MED ONE


   Stop: 01/25/18 08:43


   Last Admin: 01/25/18 11:04 Dose:  21 mg


Ondansetron HCl (Zofran Odt)  8 mg PO ONETIME ONE


   Stop: 01/24/18 08:24


   Last Admin: 01/24/18 08:30 Dose:  8 mg


Spironolactone (Aldactone) Confirm Administered Dose 50 mg .ROUTE .STK-MED ONE


   Stop: 01/24/18 20:01


   Last Admin: 01/24/18 21:00 Dose:  50 mg


Trazodone HCl (Trazodone) Confirm Administered Dose 50 mg .ROUTE .STK-MED ONE


   Stop: 01/24/18 20:01


   Last Admin: 01/24/18 21:47 Dose:  50 mg











- Exam


General: Alert, Oriented


HEENT: Pupils Equal, Pupils Reactive, EOMI


Neck: Supple


Lungs: Clear to Auscultation, Normal Respiratory Effort


Cardiovascular: Regular Rate, Regular Rhythm


GI/Abdominal Exam: Tender, Abnormal Bowel Sounds (decreased)


Extremities: Normal Inspection





- Problem List & Annotations


(1) Small bowel obstruction


SNOMED Code(s): 954758940


   Code(s): K56.609 - UNSP INTESTNL OBST, UNSP AS TO PARTIAL VERSUS COMPLETE 

OBST   Status: Acute   Priority: High   Current Visit: Yes   





(2) HTN (hypertension)


SNOMED Code(s): 42153540


   Code(s): I10 - ESSENTIAL (PRIMARY) HYPERTENSION   Status: Chronic   Current 

Visit: Yes   


Qualifiers: 


   Hypertension type: essential hypertension   Qualified Code(s): I10 - 

Essential (primary) hypertension   





(3) Migraine


SNOMED Code(s): 15479680


   Code(s): G43.909 - MIGRAINE, UNSP, NOT INTRACTABLE, WITHOUT STATUS 

MIGRAINOSUS   Status: Chronic   Current Visit: Yes   


Qualifiers: 


   Migraine type: unspecified   Intractability: not intractable 





(4) Osteopenia


SNOMED Code(s): 046598779


   Code(s): M85.80 - OTH DISRD OF BONE DENSITY AND STRUCTURE, UNSPECIFIED SITE 

  Status: Chronic   Current Visit: Yes   


Qualifiers: 


   Osteopenia location: unspecified   Qualified Code(s): M85.80 - Other 

specified disorders of bone density and structure, unspecified site   





(5) Restless leg


SNOMED Code(s): 28382947


   Code(s): G25.81 - RESTLESS LEGS SYNDROME   Status: Chronic   Current Visit: 

Yes   





(6) GERD (gastroesophageal reflux disease)


SNOMED Code(s): 599490848


   Code(s): K21.9 - GASTRO-ESOPHAGEAL REFLUX DISEASE WITHOUT ESOPHAGITIS   

Status: Chronic   Current Visit: Yes   





(7) Chronic pain


SNOMED Code(s): 78863728


   Code(s): G89.29 - OTHER CHRONIC PAIN   Status: Acute   Current Visit: Yes   





(8) Asthma


SNOMED Code(s): 299722700


   Code(s): J45.909 - UNSPECIFIED ASTHMA, UNCOMPLICATED   Status: Chronic   

Current Visit: Yes   


Qualifiers: 


   Asthma severity: mild   Asthma persistence: intermittent   Asthma 

complication type: uncomplicated   Qualified Code(s): J45.20 - Mild 

intermittent asthma, uncomplicated   





(9) Depression


SNOMED Code(s): 55462897


   Code(s): F32.9 - MAJOR DEPRESSIVE DISORDER, SINGLE EPISODE, UNSPECIFIED   

Status: Chronic   Current Visit: Yes   





- Problem List Review


Problem List Initiated/Reviewed/Updated: Yes





- My Orders


Last 24 Hours: 


My Active Orders





01/24/18 17:37


Albuterol/Ipratropium [DuoNeb 3.0-0.5 MG/3 ML]   3 ml INH Q4H PRN 





01/24/18 17:45


Acetaminophen [Tylenol Extra Strength]   500 mg PO Q6H 





01/24/18 18:00


tiZANidine [Zanaflex]   4 mg PO Q6H 





01/24/18 20:00


ClonazePAM [KlonoPIN]   1 mg PO BID 


QUEtiapine [SEROquel]   400 mg PO QPM 


Spironolactone [Aldactone]   75 mg PO BID 


Zolpidem Tartrate [Ambien Cr]   0 tab PO QPM 


buPROPion [Wellbutrin SR]   100 mg PO BID 


rOPINIRole [Requip]   1.5 mg PO BEDTIME 


traZODone   100 mg PO QPM 





01/25/18 07:00


Omeprazole   20 mg PO ACBREAKFAST 





01/25/18 08:00


Abdomen 2V AP Flat Upright [CR] DAILY 


Nicotine [Habitrol]   21 mg TRDERM DAILY 


QUEtiapine [SEROquel]   100 mg PO QAM 





01/25/18 11:12


Promethazine [Phenergan] 12.5 mg   Sodium Chloride 0.9% [Normal Saline] 50 ml 

IV Q6H 





01/25/18 14:00


Gabapentin [Neurontin]   800 mg PO TID 





01/25/18 20:00


Mometasone/Formoterol [Dulera 200-5 MCG]   2 puff IH BID 


risperiDONE [RisperiDAL]   2 mg PO QPM 














- Plan


Plan:: 





Patient admitted to inpatient for small bowel obstruction. Placed NPO except 

for ice chips and meds. We will help control pain with anti-inflammatories. 


Counseled on conservative management at this time and close monitoring. Patient 

agrees with plan of care. F/u labs in am and re-evaluate. 





1/25/18 Repeat Abd xrays shows continued partial small bowel obstruction. 

Patient has some improvement of abdominal pain. We will maintain NPO status and 

continue to monitor. Continue hydration as scheduled. Patient agrees with plan 

of care.

## 2018-01-26 RX ADMIN — ALOGLIPTIN SCH EACH: 25 TABLET, FILM COATED ORAL at 20:00

## 2018-01-26 RX ADMIN — KETOROLAC TROMETHAMINE SCH MG: 30 INJECTION, SOLUTION INTRAMUSCULAR at 06:34

## 2018-01-26 RX ADMIN — KETOROLAC TROMETHAMINE SCH MG: 30 INJECTION, SOLUTION INTRAMUSCULAR at 11:55

## 2018-01-26 RX ADMIN — KETOROLAC TROMETHAMINE SCH MG: 30 INJECTION, SOLUTION INTRAMUSCULAR at 00:06

## 2018-01-26 RX ADMIN — MOMETASONE FUROATE AND FORMOTEROL FUMARATE DIHYDRATE SCH PUFF: 200; 5 AEROSOL RESPIRATORY (INHALATION) at 20:00

## 2018-01-26 RX ADMIN — ALOGLIPTIN SCH MG: 25 TABLET, FILM COATED ORAL at 08:41

## 2018-01-26 RX ADMIN — MOMETASONE FUROATE AND FORMOTEROL FUMARATE DIHYDRATE SCH PUFF: 200; 5 AEROSOL RESPIRATORY (INHALATION) at 08:42

## 2018-01-26 RX ADMIN — KETOROLAC TROMETHAMINE SCH MG: 30 INJECTION, SOLUTION INTRAMUSCULAR at 19:58

## 2018-01-26 RX ADMIN — OMEPRAZOLE SCH MG: 20 CAPSULE, DELAYED RELEASE ORAL at 06:34

## 2018-01-27 RX ADMIN — ALOGLIPTIN SCH EACH: 25 TABLET, FILM COATED ORAL at 17:48

## 2018-01-27 RX ADMIN — ALOGLIPTIN SCH: 25 TABLET, FILM COATED ORAL at 00:09

## 2018-01-27 RX ADMIN — ALOGLIPTIN SCH: 25 TABLET, FILM COATED ORAL at 19:41

## 2018-01-27 RX ADMIN — KETOROLAC TROMETHAMINE SCH MG: 30 INJECTION, SOLUTION INTRAMUSCULAR at 16:04

## 2018-01-27 RX ADMIN — MOMETASONE FUROATE AND FORMOTEROL FUMARATE DIHYDRATE SCH PUFF: 200; 5 AEROSOL RESPIRATORY (INHALATION) at 08:18

## 2018-01-27 RX ADMIN — KETOROLAC TROMETHAMINE SCH MG: 30 INJECTION, SOLUTION INTRAMUSCULAR at 02:34

## 2018-01-27 RX ADMIN — MOMETASONE FUROATE AND FORMOTEROL FUMARATE DIHYDRATE SCH PUFF: 200; 5 AEROSOL RESPIRATORY (INHALATION) at 19:37

## 2018-01-27 RX ADMIN — ALOGLIPTIN SCH: 25 TABLET, FILM COATED ORAL at 17:27

## 2018-01-27 RX ADMIN — OMEPRAZOLE SCH MG: 20 CAPSULE, DELAYED RELEASE ORAL at 06:41

## 2018-01-27 RX ADMIN — ALOGLIPTIN SCH EACH: 25 TABLET, FILM COATED ORAL at 08:16

## 2018-01-27 RX ADMIN — KETOROLAC TROMETHAMINE SCH MG: 30 INJECTION, SOLUTION INTRAMUSCULAR at 08:28

## 2018-01-27 NOTE — PN
DATE OF VISIT:  01/27/2018

 

SUBJECTIVE:  She was admitted a couple of days ago for a bowel obstruction.  She has been on

clear liquids.  Nursing staff states that she has definitely been improving since admission.

She has been asking to go home.  The patient has been passing gas and having bowel movements

that are small.  She has not had any problems with nausea or vomiting.  She has not had an

NG tube in place this visit.

 

OBJECTIVE:  Upon entering the room, the patient is awake, she is pleasant, in no obvious

distress.  Vital signs are reviewed as listed.  Abdomen is soft.  There is just minimal

discomfort with palpation fairly diffusely.  Bowel sounds are present, but hypoactive.  Skin

is warm and dry.

 

ASSESSMENT AND PLAN:  We will increase the patient's diet today to a soft diet.  If she can

tolerate this for her noon meal and if she is still moving around well as well as getting a

followup x-ray of the abdomen, there is a chance she could be discharged today.  The IV has

been discontinued.  The patient has no other questions today.

 

 

CRS/MODL

DD:  01/27/2018 09:17:23

DT:  01/27/2018 10:18:50

Job #:  [id number]

## 2018-01-28 RX ADMIN — MOMETASONE FUROATE AND FORMOTEROL FUMARATE DIHYDRATE SCH PUFF: 200; 5 AEROSOL RESPIRATORY (INHALATION) at 21:36

## 2018-01-28 RX ADMIN — KETOROLAC TROMETHAMINE SCH: 30 INJECTION, SOLUTION INTRAMUSCULAR at 03:16

## 2018-01-28 RX ADMIN — KETOROLAC TROMETHAMINE SCH MG: 30 INJECTION, SOLUTION INTRAMUSCULAR at 00:33

## 2018-01-28 RX ADMIN — ALOGLIPTIN SCH EACH: 25 TABLET, FILM COATED ORAL at 18:12

## 2018-01-28 RX ADMIN — KETOROLAC TROMETHAMINE SCH MG: 30 INJECTION, SOLUTION INTRAMUSCULAR at 08:34

## 2018-01-28 RX ADMIN — OMEPRAZOLE SCH MG: 20 CAPSULE, DELAYED RELEASE ORAL at 08:29

## 2018-01-28 RX ADMIN — MOMETASONE FUROATE AND FORMOTEROL FUMARATE DIHYDRATE SCH PUFF: 200; 5 AEROSOL RESPIRATORY (INHALATION) at 08:31

## 2018-01-28 RX ADMIN — KETOROLAC TROMETHAMINE SCH MG: 30 INJECTION, SOLUTION INTRAMUSCULAR at 14:17

## 2018-01-28 RX ADMIN — ALOGLIPTIN SCH: 25 TABLET, FILM COATED ORAL at 08:30

## 2018-01-28 RX ADMIN — ALOGLIPTIN SCH EACH: 25 TABLET, FILM COATED ORAL at 08:32

## 2018-01-28 RX ADMIN — ALOGLIPTIN SCH: 25 TABLET, FILM COATED ORAL at 21:36

## 2018-01-28 NOTE — PN
DATE OF VISIT:  01/28/2018

 

SUBJECTIVE:  This patient is an inpatient in the hospital for bowel obstruction.  Yesterday,

we tried increasing her diet to soft from clear.  She states that she was tolerating it

okay.  X-rays were taken late morning, which did show ongoing air-fluid levels, possibly

slightly worse than before.  The patient was brought back to a clear liquid diet.  She

states that she feels pretty good.  She is not sure why they changed her diet back to clear

liquid.  She walked several times yesterday.  She has had a bowel movement.  She states that

she feels okay and was hoping that she could go home.

 

OBJECTIVE:  Vital signs are reviewed today, they are normal.  Physical exam; abdomen is

soft.  Minimal discomfort with palpation.  Bowel sounds are present but hypoactive.  Skin is

warm and dry.

 

ASSESSMENT AND PLAN:  At this point, we will increase the patient's diet back to soft food.

I want to see how she does with this today.  We will continue to monitor her and see how it

goes over the course of the day.  If she has not improved and is not tolerating the diet,

she will stay with us in the hospital.  I feel she may need to stay another day anyway, and

a GI consult may be needed tomorrow.

 

 

CRS/MODL

DD:  01/28/2018 09:16:55

DT:  01/28/2018 09:34:38

Job #:  [id number]

## 2018-01-28 NOTE — CR
DATE OF SERVICE:  10/27/2018

CLINICAL DATA:  Small bowel obstruction.



SUPINE AND UPRIGHT ABDOMEN:



Comparison is made to a prior exam dated 01/25/2018.  



There are persistent findings consistent with a small bowel obstruction with 
progressive dilatation of the small bowel compared to the prior study.  



No free air.  



[id number]
[facility], [place]

## 2018-01-29 VITALS — DIASTOLIC BLOOD PRESSURE: 67 MMHG | SYSTOLIC BLOOD PRESSURE: 134 MMHG

## 2018-01-29 RX ADMIN — OMEPRAZOLE SCH: 20 CAPSULE, DELAYED RELEASE ORAL at 07:12

## 2018-01-29 RX ADMIN — MOMETASONE FUROATE AND FORMOTEROL FUMARATE DIHYDRATE SCH PUFF: 200; 5 AEROSOL RESPIRATORY (INHALATION) at 07:23

## 2018-01-29 RX ADMIN — ALOGLIPTIN SCH: 25 TABLET, FILM COATED ORAL at 07:13

## 2018-01-29 RX ADMIN — ALOGLIPTIN SCH EACH: 25 TABLET, FILM COATED ORAL at 08:17

## 2018-01-29 RX ADMIN — OMEPRAZOLE SCH MG: 20 CAPSULE, DELAYED RELEASE ORAL at 05:42

## 2018-01-30 NOTE — PCM.DCSUM1
**Discharge Summary





- Discharge Data


Discharge Date: 01/29/18


Discharge Disposition: Home, Self-Care 01


Condition: Good





- Discharge Diagnosis/Problem(s)


(1) Small bowel obstruction


SNOMED Code(s): 456978424


   ICD Code: K56.609 - UNSP INTESTNL OBST, UNSP AS TO PARTIAL VERSUS COMPLETE 

OBST   Status: Resolved   Priority: High   





(2) HTN (hypertension)


SNOMED Code(s): 58515265


   ICD Code: I10 - ESSENTIAL (PRIMARY) HYPERTENSION   Status: Chronic   


Qualifiers: 


   Hypertension type: essential hypertension   Qualified Code(s): I10 - 

Essential (primary) hypertension   





(3) Migraine


SNOMED Code(s): 32630525


   ICD Code: G43.909 - MIGRAINE, UNSP, NOT INTRACTABLE, WITHOUT STATUS 

MIGRAINOSUS   Status: Chronic   


Qualifiers: 


   Migraine type: unspecified   Intractability: not intractable 





(4) Osteopenia


SNOMED Code(s): 492153389


   ICD Code: M85.80 - OTH DISRD OF BONE DENSITY AND STRUCTURE, UNSPECIFIED SITE

   Status: Chronic   


Qualifiers: 


   Osteopenia location: unspecified   Qualified Code(s): M85.80 - Other 

specified disorders of bone density and structure, unspecified site   





(5) Restless leg


SNOMED Code(s): 38513258


   ICD Code: G25.81 - RESTLESS LEGS SYNDROME   Status: Chronic   





(6) GERD (gastroesophageal reflux disease)


SNOMED Code(s): 472808598


   ICD Code: K21.9 - GASTRO-ESOPHAGEAL REFLUX DISEASE WITHOUT ESOPHAGITIS   

Status: Chronic   





(7) Chronic pain


SNOMED Code(s): 15995877


   ICD Code: G89.29 - OTHER CHRONIC PAIN   Status: Acute   





(8) Asthma


SNOMED Code(s): 878813285


   ICD Code: J45.909 - UNSPECIFIED ASTHMA, UNCOMPLICATED   Status: Chronic   


Qualifiers: 


   Asthma severity: mild   Asthma persistence: intermittent   Asthma 

complication type: uncomplicated   Qualified Code(s): J45.20 - Mild 

intermittent asthma, uncomplicated   





(9) Depression


SNOMED Code(s): 04735738


   ICD Code: F32.9 - MAJOR DEPRESSIVE DISORDER, SINGLE EPISODE, UNSPECIFIED   

Status: Chronic   





- Patient Instructions


Diet: Mechanical Soft


Activity: As Tolerated


Notify Provider of: Fever, Nausea and/or Vomiting





- Discharge Plan


Home Medications: 


 Home Meds





Ipratropium/Albuterol Sulfate [Duoneb 0.5 MG-3 MG/3 ML] 1 - 2 puff INH Q4H PRN 

09/29/13 [History]


Omeprazole 20 mg PO ACBREAKFAST 09/29/13 [History]


risperiDONE [RisperiDAL M-Tab] 2 mg PO QPM 09/29/13 [History]


Acetaminophen [Tylenol Extra Strength] 500 mg PO Q6H 11/21/15 [History]


Budesonide/Formoterol [Symbicort 160-4.5 MCG] 2 puff INH BID 11/21/15 [History]


Gabapentin [Neurontin] 1 tab PO TID 11/21/15 [History]


QUEtiapine [SEROquel] 100 tab PO QAM 11/21/15 [History]


Spironolactone [Aldactone] 3 tab PO BID 11/21/15 [History]


buPROPion [Wellbutrin SR] 100 mg PO BID 11/21/15 [History]


hydrOXYzine HCl [hydrOXYzine] 1 - 3 cap PO TID 11/21/15 [History]


rOPINIRole [Requip] 1.5 tab PO BEDTIME 11/21/15 [History]


tiZANidine [Zanaflex] 4 mg PO Q6H 08/14/16 [History]


Zolpidem Tartrate [Ambien Cr] 12.5 tab PO QPM 01/16/17 [History]


ClonazePAM [KlonoPIN] 1 mg PO BID 12/10/17 [History]


QUEtiapine Fumarate [Quetiapine Fumarate] 400 mg PO QPM 12/10/17 [History]


traZODone HCl [Trazodone HCl] 100 mg PO QPM 12/10/17 [History]


sitaGLIPtin Phos/Metformin HCl [Janumet 50-1,000 MG] 1 tab PO BID 01/26/18 [

History]








Patient Handouts:  High-Fiber Diet, Small Bowel Obstruction, Easy-to-Read, Soft-

Food Meal Plan





- Discharge Summary/Plan Comment


Discharge Summary/Plan Comment: 





Counseled on lifestyle modification and diet changes. Discussed soft light diet 

for at least 2-4 wks. F/u in clinic in the next week. Dicyclomine prescribed 

for abdominal cramping. F/u as needed. 





- Patient Data


Vitals - Most Recent: 


 Last Vital Signs











Temp  36.2 C   01/29/18 08:00


 


Pulse  81   01/29/18 08:00


 


Resp  20   01/29/18 04:00


 


BP  134/67   01/29/18 08:00


 


Pulse Ox  94 L  01/29/18 08:00











Weight - Most Recent: 111.039 kg


Med Orders - Current: 


 Current Medications








Discontinued Medications





Acetaminophen (Tylenol Extra Strength)  500 mg PO Q6H [facility]


   Last Admin: 01/28/18 18:12 Dose:  500 mg


Acetaminophen (Tylenol Extra Strength) Confirm Administered Dose 500 mg .ROUTE 

.[facility]-MED ONE


   Stop: 01/28/18 19:45


   Last Admin: 01/29/18 00:26 Dose:  Not Given


Acetaminophen (Tylenol Extra Strength)  1,000 mg PO Q6H [facility]


   Last Admin: 01/29/18 05:43 Dose:  1,000 mg


Albuterol/Ipratropium (Duoneb 3.0-0.5 Mg/3 Ml)  3 ml INH Q4H PRN


   PRN Reason: Shortness of Breath


   Last Admin: 01/28/18 19:41 Dose:  3 ml


Buspirone HCl (Buspar)  7.5 mg PO TID PRN


   PRN Reason: Anxiety


Buspirone HCl (Buspar)  10 mg PO TID PRN


   PRN Reason: Anxiety


Buspirone HCl (Buspar) Confirm Administered Dose 10 mg .ROUTE .K-MED ONE


   Stop: 01/28/18 22:26


   Last Admin: 01/28/18 22:32 Dose:  10 mg


Clonazepam (Klonopin)  1 mg PO BID [facility]


   Last Admin: 01/29/18 07:25 Dose:  1 mg


Gabapentin (Neurontin)  800 mg PO TID [facility]


   Last Admin: 01/25/18 08:29 Dose:  800 mg


Gabapentin (Neurontin)  800 mg PO TID [facility]


   Last Admin: 01/29/18 07:24 Dose:  800 mg


Sodium Chloride (Normal Saline)  1,000 mls @ 100 mls/hr IV ASDIRECTED [facility]


   Last Admin: 01/26/18 19:51 Dose:  150 mls/hr


Promethazine HCl 12.5 mg/ (Sodium Chloride)  50.5 mls @ 200 mls/hr IV Q6H PRN


   PRN Reason: Nausea/Vomiting


   Stop: 01/26/18 21:15


   Last Admin: 01/26/18 15:21 Dose:  200 mls/hr


Sodium Chloride (Normal Saline)  1,000 mls @ 30 mls/hr IV ASDIRECTED [facility]


Influenza Virus Vaccine (Flulaval Quad [id number])  60 mcg IM .ONCE ONE


   Stop: 01/29/18 09:31


   Last Admin: 01/29/18 09:50 Dose:  60 mcg


Ketorolac Tromethamine (Toradol)  15 mg IVPUSH Q6H PRN


   PRN Reason: PAIN


Ketorolac Tromethamine (Toradol)  15 mg IVPUSH Q6H [facility]


   Last Admin: 01/27/18 02:34 Dose:  15 mg


Ketorolac Tromethamine (Toradol) Confirm Administered Dose 30 mg .ROUTE .STK-

MED ONE


   Stop: 01/24/18 16:45


   Last Admin: 01/24/18 19:40 Dose:  Not Given


Ketorolac Tromethamine (Toradol) Confirm Administered Dose 30 mg .ROUTE .STK-

MED ONE


   Stop: 01/24/18 22:24


   Last Admin: 01/24/18 23:30 Dose:  30 mg


Ketorolac Tromethamine (Toradol) Confirm Administered Dose 30 mg .ROUTE .STK-

MED ONE


   Stop: 01/25/18 04:41


   Last Admin: 01/25/18 04:47 Dose:  30 mg


Ketorolac Tromethamine (Toradol) Confirm Administered Dose 30 mg .ROUTE .STK-

MED ONE


   Stop: 01/25/18 23:56


   Last Admin: 01/26/18 02:03 Dose:  Not Given


Ketorolac Tromethamine (Toradol) Confirm Administered Dose 30 mg .ROUTE .STK-

MED ONE


   Stop: 01/26/18 11:52


   Last Admin: 01/26/18 11:56 Dose:  Not Given


Ketorolac Tromethamine (Toradol) Confirm Administered Dose 30 mg .ROUTE .STK-

MED ONE


   Stop: 01/26/18 19:36


   Last Admin: 01/26/18 19:58 Dose:  Not Given


Ketorolac Tromethamine (Toradol) Confirm Administered Dose 30 mg .ROUTE .STK-

MED ONE


   Stop: 01/27/18 02:18


   Last Admin: 01/27/18 02:17 Dose:  Not Given


Ketorolac Tromethamine (Toradol)  15 mg IVPUSH Q6H [facility]


   Stop: 01/29/18 11:59


   Last Admin: 01/28/18 14:17 Dose:  15 mg


Metoclopramide HCl (Reglan)  10 mg IVPUSH Q8H [facility]


   Last Admin: 01/27/18 17:46 Dose:  10 mg


Metoclopramide HCl (Reglan)  10 mg IVPUSH Q8H PRN


   PRN Reason: Cramping


   Last Admin: 01/28/18 13:25 Dose:  10 mg


Metoclopramide HCl (Reglan) Confirm Administered Dose 10 mg .ROUTE .STK-MED ONE


   Stop: 01/27/18 17:35


   Last Admin: 01/27/18 18:00 Dose:  Not Given


Mometasone Furoate/Formoterol Fumar (Dulera 200-5 Mcg)  2 puff IH BID [facility]


   Last Admin: 01/29/18 07:23 Dose:  2 puff


Nicotine (Habitrol)  21 mg TRDERM DAILY [facility]


   Last Admin: 01/29/18 07:36 Dose:  21 mg


Nicotine (Habitrol) Confirm Administered Dose 21 mg .ROUTE .STK-MED ONE


   Stop: 01/24/18 16:44


   Last Admin: 01/24/18 16:00 Dose:  21 mg


Nicotine (Habitrol) Confirm Administered Dose 21 mg .ROUTE .STK-MED ONE


   Stop: 01/25/18 08:43


   Last Admin: 01/25/18 11:04 Dose:  21 mg


Nicotine (Habitrol) Confirm Administered Dose 21 mg .ROUTE .STK-MED ONE


   Stop: 01/26/18 09:37


   Last Admin: 01/26/18 09:40 Dose:  21 mg


Nicotine (Habitrol) Confirm Administered Dose 21 mg .ROUTE .STK-MED ONE


   Stop: 01/27/18 09:59


   Last Admin: 01/27/18 11:33 Dose:  Not Given


Nicotine (Habitrol) Confirm Administered Dose 21 mg .ROUTE .STK-MED ONE


   Stop: 01/28/18 08:47


   Last Admin: 01/28/18 08:00 Dose:  Not Given


Nicotine (Habitrol) Confirm Administered Dose 21 mg .ROUTE .STK-MED ONE


   Stop: 01/29/18 07:35


   Last Admin: 01/29/18 07:56 Dose:  Not Given


Bupropion [ (Wellbutrin Sr] 100mg)  100 mg PO BID [facility]


   Last Admin: 01/29/18 07:13 Dose:  Not Given


Zolpidem Tartrate Cr ([Ambien Cr] 12.5mg)  0 tab PO QPM [facility]


Sitagliptin/Metformin (Janumet) 50/1000 Mg Tab **Own Med**  1 each PO BID@0800,

1700 [facility]


   Last Admin: 01/29/18 08:17 Dose:  1 each


Non-Formulary Medication (Zolpidem Tartrate [Ambien Cr])  10 tab PO QPM [facility]


Omeprazole (Omeprazole)  20 mg PO ACBREAKFAST [facility]


   Last Admin: 01/29/18 07:12 Dose:  Not Given


Ondansetron HCl (Zofran Odt)  8 mg PO ONETIME ONE


   Stop: 01/24/18 08:24


   Last Admin: 01/24/18 08:30 Dose:  8 mg


Pneumococcal 13-Valent Conj Vacc (Prevnar 13)  0.5 ml IM .ONCE ONE


   Stop: 01/29/18 09:31


   Last Admin: 01/29/18 09:43 Dose:  0.5 ml


Promethazine HCl (Phenergan)  12.5 mg PO Q6H PRN


   PRN Reason: Nausea/Vomiting


   Last Admin: 01/28/18 19:46 Dose:  12.5 mg


Quetiapine Fumarate (Seroquel)  400 mg PO QPM [facility]


   Last Admin: 01/28/18 19:37 Dose:  400 mg


Quetiapine Fumarate (Seroquel)  100 mg PO QAM [facility]


   Last Admin: 01/29/18 07:24 Dose:  100 mg


Quetiapine Fumarate (Quetiapine Fumarate) Confirm Administered Dose 400 mg PO 

.STK-MED ONE


   Stop: 01/26/18 20:17


   Last Admin: 01/26/18 19:58 Dose:  Not Given


Quetiapine Fumarate (Quetiapine Fumarate) Confirm Administered Dose 400 mg PO 

.STK-MED ONE


   Stop: 01/27/18 19:19


   Last Admin: 01/28/18 00:43 Dose:  Not Given


Quetiapine Fumarate (Quetiapine Fumarate) Confirm Administered Dose 400 mg PO 

.STK-MED ONE


   Stop: 01/28/18 19:28


   Last Admin: 01/29/18 00:26 Dose:  Not Given


Risperidone (Risperidal)  2 mg PO QPM [facility]


   Last Admin: 01/28/18 19:34 Dose:  2 mg


Ropinirole HCl (Requip)  1.5 mg PO BEDTIME [facility]


   Last Admin: 01/28/18 19:36 Dose:  1.5 mg


Sodium Chloride (Saline Flush)  10 ml FLUSH ASDIRECTED PRN


   PRN Reason: Keep Vein Open


Spironolactone (Aldactone)  75 mg PO BID [facility]


   Last Admin: 01/29/18 07:24 Dose:  75 mg


Spironolactone (Aldactone) Confirm Administered Dose 50 mg .ROUTE .[employer]-MED ONE


   Stop: 01/24/18 20:01


   Last Admin: 01/24/18 21:00 Dose:  50 mg


Tizanidine HCl (Zanaflex)  4 mg PO Q6H [facility]


   Last Admin: 01/27/18 02:36 Dose:  4 mg


Tizanidine HCl (Zanaflex)  4 mg PO Q6H [facility]


   Last Admin: 01/29/18 07:13 Dose:  Not Given


Trazodone HCl (Trazodone)  100 mg PO QPM [facility]


   Last Admin: 01/28/18 19:35 Dose:  100 mg


Trazodone HCl (Trazodone) Confirm Administered Dose 50 mg .ROUTE .[employer]-MED ONE


   Stop: 01/24/18 20:01


   Last Admin: 01/24/18 21:47 Dose:  50 mg


Zolpidem Tartrate (Ambien)  10 mg PO BEDTIME PRN


   PRN Reason: Insomnia


   Last Admin: 01/28/18 22:32 Dose:  10 mg











*Q Meaningful Use (DIS)





- VTE *Q


VTE Criteria *Q: 








- Stroke *Q


Stroke Criteria *Q: 








- AMI *Q


AMI Criteria *Q:

## 2018-01-31 NOTE — PCM.PN
- General Info


Date of Service: 01/26/18


Subjective Update: 





Patient has some gas but limited BM. She has continued abdominal pains and 

discomfort. 





- Review of Systems


General: Reports: No Symptoms


HEENT: Reports: No Symptoms


Pulmonary: Reports: No Symptoms


Cardiovascular: Reports: No Symptoms


Gastrointestinal: Reports: Abdominal Pain, Nausea


Genitourinary: Reports: No Symptoms


Neurological: Reports: No Symptoms


Psychiatric: Reports: No Symptoms





- Patient Data


Vitals - Most Recent: 


 Last Vital Signs











Temp  36.2 C   01/29/18 08:00


 


Pulse  81   01/29/18 08:00


 


Resp  20   01/29/18 04:00


 


BP  134/67   01/29/18 08:00


 


Pulse Ox  94 L  01/29/18 08:00











Weight - Most Recent: 111.039 kg


Med Orders - Current: 


 Current Medications








Discontinued Medications





Acetaminophen (Tylenol Extra Strength)  500 mg PO Q6H [facility]


   Last Admin: 01/28/18 18:12 Dose:  500 mg


Acetaminophen (Tylenol Extra Strength) Confirm Administered Dose 500 mg .ROUTE 

.[facility]-MED ONE


   Stop: 01/28/18 19:45


   Last Admin: 01/29/18 00:26 Dose:  Not Given


Acetaminophen (Tylenol Extra Strength)  1,000 mg PO Q6H [facility]


   Last Admin: 01/29/18 05:43 Dose:  1,000 mg


Albuterol/Ipratropium (Duoneb 3.0-0.5 Mg/3 Ml)  3 ml INH Q4H PRN


   PRN Reason: Shortness of Breath


   Last Admin: 01/28/18 19:41 Dose:  3 ml


Buspirone HCl (Buspar)  7.5 mg PO TID PRN


   PRN Reason: Anxiety


Buspirone HCl (Buspar)  10 mg PO TID PRN


   PRN Reason: Anxiety


Buspirone HCl (Buspar) Confirm Administered Dose 10 mg .ROUTE .STK-MED ONE


   Stop: 01/28/18 22:26


   Last Admin: 01/28/18 22:32 Dose:  10 mg


Clonazepam (Klonopin)  1 mg PO BID [facility]


   Last Admin: 01/29/18 07:25 Dose:  1 mg


Gabapentin (Neurontin)  800 mg PO TID [facility]


   Last Admin: 01/25/18 08:29 Dose:  800 mg


Gabapentin (Neurontin)  800 mg PO TID [facility]


   Last Admin: 01/29/18 07:24 Dose:  800 mg


Sodium Chloride (Normal Saline)  1,000 mls @ 100 mls/hr IV ASDIRECTED [facility]


   Last Admin: 01/26/18 19:51 Dose:  150 mls/hr


Promethazine HCl 12.5 mg/ (Sodium Chloride)  50.5 mls @ 200 mls/hr IV Q6H PRN


   PRN Reason: Nausea/Vomiting


   Stop: 01/26/18 21:15


   Last Admin: 01/26/18 15:21 Dose:  200 mls/hr


Sodium Chloride (Normal Saline)  1,000 mls @ 30 mls/hr IV ASDIRECTED [facility]


Influenza Virus Vaccine (Flulaval Quad [id number])  60 mcg IM .ONCE ONE


   Stop: 01/29/18 09:31


   Last Admin: 01/29/18 09:50 Dose:  60 mcg


Ketorolac Tromethamine (Toradol)  15 mg IVPUSH Q6H PRN


   PRN Reason: PAIN


Ketorolac Tromethamine (Toradol)  15 mg IVPUSH Q6H [facility]


   Last Admin: 01/27/18 02:34 Dose:  15 mg


Ketorolac Tromethamine (Toradol) Confirm Administered Dose 30 mg .ROUTE .STK-

MED ONE


   Stop: 01/24/18 16:45


   Last Admin: 01/24/18 19:40 Dose:  Not Given


Ketorolac Tromethamine (Toradol) Confirm Administered Dose 30 mg .ROUTE .STK-

MED [facility]


   Stop: 01/24/18 22:24


   Last Admin: 01/24/18 23:30 Dose:  30 mg


Ketorolac Tromethamine (Toradol) Confirm Administered Dose 30 mg .ROUTE .STK-

MED ONE


   Stop: 01/25/18 04:41


   Last Admin: 01/25/18 04:47 Dose:  30 mg


Ketorolac Tromethamine (Toradol) Confirm Administered Dose 30 mg .ROUTE .STK-

MED [facility]


   Stop: 01/25/18 23:56


   Last Admin: 01/26/18 02:03 Dose:  Not Given


Ketorolac Tromethamine (Toradol) Confirm Administered Dose 30 mg .ROUTE .STK-

MED ONE


   Stop: 01/26/18 11:52


   Last Admin: 01/26/18 11:56 Dose:  Not Given


Ketorolac Tromethamine (Toradol) Confirm Administered Dose 30 mg .ROUTE .STK-

MED ONE


   Stop: 01/26/18 19:36


   Last Admin: 01/26/18 19:58 Dose:  Not Given


Ketorolac Tromethamine (Toradol) Confirm Administered Dose 30 mg .ROUTE .STK-

MED ONE


   Stop: 01/27/18 02:18


   Last Admin: 01/27/18 02:17 Dose:  Not Given


Ketorolac Tromethamine (Toradol)  15 mg IVPUSH Q6H [facility]


   Stop: 01/29/18 11:59


   Last Admin: 01/28/18 14:17 Dose:  15 mg


Metoclopramide HCl (Reglan)  10 mg IVPUSH Q8H [facility]


   Last Admin: 01/27/18 17:46 Dose:  10 mg


Metoclopramide HCl (Reglan)  10 mg IVPUSH Q8H PRN


   PRN Reason: Cramping


   Last Admin: 01/28/18 13:25 Dose:  10 mg


Metoclopramide HCl (Reglan) Confirm Administered Dose 10 mg .ROUTE .STK-MED ONE


   Stop: 01/27/18 17:35


   Last Admin: 01/27/18 18:00 Dose:  Not Given


Mometasone Furoate/Formoterol Fumar (Dulera 200-5 Mcg)  2 puff IH BID [facility]


   Last Admin: 01/29/18 07:23 Dose:  2 puff


Nicotine (Habitrol)  21 mg TRDERM DAILY [facility]


   Last Admin: 01/29/18 07:36 Dose:  21 mg


Nicotine (Habitrol) Confirm Administered Dose 21 mg .ROUTE .STK-MED ONE


   Stop: 01/24/18 16:44


   Last Admin: 01/24/18 16:00 Dose:  21 mg


Nicotine (Habitrol) Confirm Administered Dose 21 mg .ROUTE .STK-MED ONE


   Stop: 01/25/18 08:43


   Last Admin: 01/25/18 11:04 Dose:  21 mg


Nicotine (Habitrol) Confirm Administered Dose 21 mg .ROUTE .STK-MED ONE


   Stop: 01/26/18 09:37


   Last Admin: 01/26/18 09:40 Dose:  21 mg


Nicotine (Habitrol) Confirm Administered Dose 21 mg .ROUTE .STK-MED ONE


   Stop: 01/27/18 09:59


   Last Admin: 01/27/18 11:33 Dose:  Not Given


Nicotine (Habitrol) Confirm Administered Dose 21 mg .ROUTE .STK-MED ONE


   Stop: 01/28/18 08:47


   Last Admin: 01/28/18 08:00 Dose:  Not Given


Nicotine (Habitrol) Confirm Administered Dose 21 mg .ROUTE .STK-MED ONE


   Stop: 01/29/18 07:35


   Last Admin: 01/29/18 07:56 Dose:  Not Given


Bupropion [ (Wellbutrin Sr] 100mg)  100 mg PO BID [facility]


   Last Admin: 01/29/18 07:13 Dose:  Not Given


Zolpidem Tartrate Cr ([Ambien Cr] 12.5mg)  0 tab PO QPM [facility]


Sitagliptin/Metformin (Janumet) 50/1000 Mg Tab **Own Med**  1 each PO BID@0800,

1700 [facility]


   Last Admin: 01/29/18 08:17 Dose:  1 each


Non-Formulary Medication (Zolpidem Tartrate [Ambien Cr])  10 tab PO QPM [facility]


Omeprazole (Omeprazole)  20 mg PO ACBREAKFAST [facility]


   Last Admin: 01/29/18 07:12 Dose:  Not Given


Ondansetron HCl (Zofran Odt)  8 mg PO ONETIME ONE


   Stop: 01/24/18 08:24


   Last Admin: 01/24/18 08:30 Dose:  8 mg


Pneumococcal 13-Valent Conj Vacc (Prevnar 13)  0.5 ml IM .ONCE ONE


   Stop: 01/29/18 09:31


   Last Admin: 01/29/18 09:43 Dose:  0.5 ml


Promethazine HCl (Phenergan)  12.5 mg PO Q6H PRN


   PRN Reason: Nausea/Vomiting


   Last Admin: 01/28/18 19:46 Dose:  12.5 mg


Quetiapine Fumarate (Seroquel)  400 mg PO QPM [facility]


   Last Admin: 01/28/18 19:37 Dose:  400 mg


Quetiapine Fumarate (Seroquel)  100 mg PO QAM [facility]


   Last Admin: 01/29/18 07:24 Dose:  100 mg


Quetiapine Fumarate (Quetiapine Fumarate) Confirm Administered Dose 400 mg PO 

.STK-MED ONE


   Stop: 01/26/18 20:17


   Last Admin: 01/26/18 19:58 Dose:  Not Given


Quetiapine Fumarate (Quetiapine Fumarate) Confirm Administered Dose 400 mg PO 

.STK-MED ONE


   Stop: 01/27/18 19:19


   Last Admin: 01/28/18 00:43 Dose:  Not Given


Quetiapine Fumarate (Quetiapine Fumarate) Confirm Administered Dose 400 mg PO 

.STK-MED ONE


   Stop: 01/28/18 19:28


   Last Admin: 01/29/18 00:26 Dose:  Not Given


Risperidone (Risperidal)  2 mg PO QPM [facility]


   Last Admin: 01/28/18 19:34 Dose:  2 mg


Ropinirole HCl (Requip)  1.5 mg PO BEDTIME [facility]


   Last Admin: 01/28/18 19:36 Dose:  1.5 mg


Sodium Chloride (Saline Flush)  10 ml FLUSH ASDIRECTED PRN


   PRN Reason: Keep Vein Open


Spironolactone (Aldactone)  75 mg PO BID [facility]


   Last Admin: 01/29/18 07:24 Dose:  75 mg


Spironolactone (Aldactone) Confirm Administered Dose 50 mg .ROUTE .STK-MED ONE


   Stop: 01/24/18 20:01


   Last Admin: 01/24/18 21:00 Dose:  50 mg


Tizanidine HCl (Zanaflex)  4 mg PO Q6H [facility]


   Last Admin: 01/27/18 02:36 Dose:  4 mg


Tizanidine HCl (Zanaflex)  4 mg PO Q6H [facility]


   Last Admin: 01/29/18 07:13 Dose:  Not Given


Trazodone HCl (Trazodone)  100 mg PO QPM [facility]


   Last Admin: 01/28/18 19:35 Dose:  100 mg


Trazodone HCl (Trazodone) Confirm Administered Dose 50 mg .ROUTE .STK-MED ONE


   Stop: 01/24/18 20:01


   Last Admin: 01/24/18 21:47 Dose:  50 mg


Zolpidem Tartrate (Ambien)  10 mg PO BEDTIME PRN


   PRN Reason: Insomnia


   Last Admin: 01/28/18 22:32 Dose:  10 mg











- Exam


General: Alert, Oriented


HEENT: Pupils Equal, Pupils Reactive


Neck: Supple


Lungs: Clear to Auscultation, Normal Respiratory Effort


Cardiovascular: Regular Rate, Regular Rhythm


GI/Abdominal Exam: Abnormal Bowel Sounds (decreased)


Extremities: Normal Inspection





- Problem List & Annotations


(1) Small bowel obstruction


SNOMED Code(s): 946809736


   Code(s): K56.609 - UNSP INTESTNL OBST, UNSP AS TO PARTIAL VERSUS COMPLETE 

OBST   Status: Resolved   Priority: High   





(2) HTN (hypertension)


SNOMED Code(s): 74360184


   Code(s): I10 - ESSENTIAL (PRIMARY) HYPERTENSION   Status: Chronic   


Qualifiers: 


   Hypertension type: essential hypertension   Qualified Code(s): I10 - 

Essential (primary) hypertension   





(3) Migraine


SNOMED Code(s): 41556535


   Code(s): G43.909 - MIGRAINE, UNSP, NOT INTRACTABLE, WITHOUT STATUS 

MIGRAINOSUS   Status: Chronic   


Qualifiers: 


   Migraine type: unspecified   Intractability: not intractable 





(4) Osteopenia


SNOMED Code(s): 042827126


   Code(s): M85.80 - OTH DISRD OF BONE DENSITY AND STRUCTURE, UNSPECIFIED SITE 

  Status: Chronic   


Qualifiers: 


   Osteopenia location: unspecified   Qualified Code(s): M85.80 - Other 

specified disorders of bone density and structure, unspecified site   





(5) Restless leg


SNOMED Code(s): 70491002


   Code(s): G25.81 - RESTLESS LEGS SYNDROME   Status: Chronic   





(6) GERD (gastroesophageal reflux disease)


SNOMED Code(s): 826532872


   Code(s): K21.9 - GASTRO-ESOPHAGEAL REFLUX DISEASE WITHOUT ESOPHAGITIS   

Status: Chronic   





(7) Chronic pain


SNOMED Code(s): 76174642


   Code(s): G89.29 - OTHER CHRONIC PAIN   Status: Acute   





(8) Asthma


SNOMED Code(s): 903049054


   Code(s): J45.909 - UNSPECIFIED ASTHMA, UNCOMPLICATED   Status: Chronic   


Qualifiers: 


   Asthma severity: mild   Asthma persistence: intermittent   Asthma 

complication type: uncomplicated   Qualified Code(s): J45.20 - Mild 

intermittent asthma, uncomplicated   





(9) Depression


SNOMED Code(s): 51529461


   Code(s): F32.9 - MAJOR DEPRESSIVE DISORDER, SINGLE EPISODE, UNSPECIFIED   

Status: Chronic   





- Problem List Review


Problem List Initiated/Reviewed/Updated: Yes





- Plan


Plan:: 





Patient admitted to inpatient for small bowel obstruction. Placed NPO except 

for ice chips and meds. We will help control pain with anti-inflammatories. 


Counseled on conservative management at this time and close monitoring. Patient 

agrees with plan of care. F/u labs in am and re-evaluate. 





1/25/18 Repeat Abd xrays shows continued partial small bowel obstruction. 

Patient has some improvement of abdominal pain. We will maintain NPO status and 

continue to monitor. Continue hydration as scheduled. Patient agrees with plan 

of care. 





1/26/18 Patient to continue NPO status with IV hydration. We will closely 

monitor until increased BM and decreased abdominal discomfort. F/u in AM. 

Signed out to Lyle at 1130am.

## 2018-03-05 ENCOUNTER — HOSPITAL ENCOUNTER (EMERGENCY)
Dept: HOSPITAL 60 - LB.ED | Age: 53
Discharge: HOME | End: 2018-03-05
Payer: MEDICARE

## 2018-03-05 VITALS — SYSTOLIC BLOOD PRESSURE: 146 MMHG | DIASTOLIC BLOOD PRESSURE: 99 MMHG

## 2018-03-05 DIAGNOSIS — I10: ICD-10-CM

## 2018-03-05 DIAGNOSIS — Z79.899: ICD-10-CM

## 2018-03-05 DIAGNOSIS — E11.9: ICD-10-CM

## 2018-03-05 DIAGNOSIS — Z88.8: ICD-10-CM

## 2018-03-05 DIAGNOSIS — J44.9: Primary | ICD-10-CM

## 2018-03-05 DIAGNOSIS — F17.210: ICD-10-CM

## 2018-03-05 PROCEDURE — A0425 GROUND MILEAGE: HCPCS

## 2018-03-05 PROCEDURE — A0429 BLS-EMERGENCY: HCPCS

## 2018-03-06 ENCOUNTER — HOSPITAL ENCOUNTER (OUTPATIENT)
Dept: HOSPITAL 60 - LB.ED | Age: 53
Setting detail: OBSERVATION
Discharge: HOME | End: 2018-03-06
Attending: NURSE PRACTITIONER | Admitting: NURSE PRACTITIONER
Payer: MEDICARE

## 2018-03-06 ENCOUNTER — HOSPITAL ENCOUNTER (EMERGENCY)
Dept: HOSPITAL 60 - LB.ED | Age: 53
Discharge: HOME | End: 2018-03-06
Payer: MEDICARE

## 2018-03-06 VITALS — DIASTOLIC BLOOD PRESSURE: 78 MMHG | SYSTOLIC BLOOD PRESSURE: 139 MMHG

## 2018-03-06 VITALS — SYSTOLIC BLOOD PRESSURE: 151 MMHG | DIASTOLIC BLOOD PRESSURE: 74 MMHG

## 2018-03-06 DIAGNOSIS — Z88.8: ICD-10-CM

## 2018-03-06 DIAGNOSIS — I10: ICD-10-CM

## 2018-03-06 DIAGNOSIS — F17.210: ICD-10-CM

## 2018-03-06 DIAGNOSIS — F41.9: ICD-10-CM

## 2018-03-06 DIAGNOSIS — Z90.710: ICD-10-CM

## 2018-03-06 DIAGNOSIS — Z76.5: ICD-10-CM

## 2018-03-06 DIAGNOSIS — F32.9: ICD-10-CM

## 2018-03-06 DIAGNOSIS — Z79.899: ICD-10-CM

## 2018-03-06 DIAGNOSIS — R06.02: Primary | ICD-10-CM

## 2018-03-06 DIAGNOSIS — J45.909: ICD-10-CM

## 2018-03-06 DIAGNOSIS — E66.9: ICD-10-CM

## 2018-03-06 DIAGNOSIS — R06.02: ICD-10-CM

## 2018-03-06 DIAGNOSIS — Z91.09: ICD-10-CM

## 2018-03-06 DIAGNOSIS — E11.9: ICD-10-CM

## 2018-03-06 DIAGNOSIS — K21.9: ICD-10-CM

## 2018-03-06 DIAGNOSIS — R07.89: Primary | ICD-10-CM

## 2018-03-06 PROCEDURE — A0429 BLS-EMERGENCY: HCPCS

## 2018-03-06 PROCEDURE — G0378 HOSPITAL OBSERVATION PER HR: HCPCS

## 2018-03-06 PROCEDURE — A0425 GROUND MILEAGE: HCPCS

## 2018-03-06 RX ADMIN — ONDANSETRON ONE MG: 4 TABLET, ORALLY DISINTEGRATING ORAL at 13:21

## 2018-03-06 RX ADMIN — ONDANSETRON ONE: 4 TABLET, ORALLY DISINTEGRATING ORAL at 13:54

## 2018-03-06 NOTE — EDM.PDOC
ED HPI GENERAL MEDICAL PROBLEM





- General


Chief Complaint: General


Stated Complaint: short of breath


Time Seen by Provider: 18 03:41


Source of Information: Reports: Patient


History Limitations: Reports: No Limitations





- History of Present Illness


INITIAL COMMENTS - FREE TEXT/NARRATIVE: 





This is a 51yo F here for shortness of breath per EMS and brought in via 

ambulance. Patient states she felt short of breath again. Patient states she 

also has chest pain that has been there all day and is very tender to touch. 

Patient denies any other concerns. 


Onset: Sudden


Duration: Hour(s):


Location: Reports: Chest


Quality: Reports: Sharp


Severity: Severe


Improves with: Reports: None


Worsens with: Reports: None


Associated Symptoms: Reports: Shortness of Breath


  ** Generalized


Pain Score (Numeric/FACES): 5





- Related Data


 Allergies











Allergy/AdvReac Type Severity Reaction Status Date / Time


 


adhesive tape Allergy  Rash Verified 18 03:22


 


celecoxib [From Celebrex] Allergy  Nausea and Verified 18 03:22





   Vomiting  











Home Meds: 


 Home Meds





Ipratropium/Albuterol Sulfate [Duoneb 0.5 MG-3 MG/3 ML] 1 - 2 puff INH Q4H PRN 

13 [History]


Omeprazole 20 mg PO ACBREAKFAST 13 [History]


risperiDONE [RisperiDAL M-Tab] 2 mg PO QPM 13 [History]


Acetaminophen [Tylenol Extra Strength] 500 mg PO Q6H 11/21/15 [History]


Budesonide/Formoterol [Symbicort 160-4.5 MCG] 2 puff INH BID 11/21/15 [History]


Gabapentin [Neurontin] 1 tab PO TID 11/21/15 [History]


QUEtiapine [SEROquel] 100 tab PO QAM 11/21/15 [History]


Spironolactone [Aldactone] 3 tab PO BID 11/21/15 [History]


buPROPion [Wellbutrin SR] 100 mg PO BID 11/21/15 [History]


hydrOXYzine HCl [hydrOXYzine] 1 - 3 cap PO TID 11/21/15 [History]


rOPINIRole [Requip] 1.5 tab PO BEDTIME 11/21/15 [History]


tiZANidine [Zanaflex] 4 mg PO Q6H 16 [History]


Zolpidem Tartrate [Ambien Cr] 12.5 tab PO QPM 17 [History]


ClonazePAM [KlonoPIN] 1 mg PO BID 12/10/17 [History]


QUEtiapine Fumarate [Quetiapine Fumarate] 400 mg PO QPM 12/10/17 [History]


traZODone HCl [Trazodone HCl] 100 mg PO QPM 12/10/17 [History]


sitaGLIPtin Phos/Metformin HCl [Janumet 50-1,000 MG] 1 tab PO BID 18 [

History]











Past Medical History


HEENT History: Reports: Impaired Vision, Other (See Below)


Other HEENT History: glasses for reading,


Cardiovascular History: Reports: Hypertension


Respiratory History: Reports: Asthma, Bronchitis, Recurrent, Pneumonia, 

Recurrent, Other (See Below)


Other Respiratory History: Emphysema


Gastrointestinal History: Reports: Bowel Obstruction, GERD, Irritable Bowel 

Syndrome


Genitourinary History: Reports: UTI, Recurrent


OB/GYN History: Reports: Dysfunctional Uterine Bleeding, Pregnancy


Musculoskeletal History: Reports: Back Pain, Chronic, Other (See Below)


Other Musculoskeletal History: back / butt pain, shoots down left leg


Neurological History: Reports: Migraines


Psychiatric History: Reports: Anxiety, Depression, Suicide Attempt, Suicidal 

Ideation


Endocrine/Metabolic History: Reports: Diabetes, Type II, Obesity/BMI 30+


Hematologic History: Reports: Blood Transfusion(s), Iron Deficiency


Dermatologic History: Reports: Eczema, Other (See Below)


Other Dermatologic History: Allergy to paper tape





- Infectious Disease History


Infectious Disease History: Reports: Chicken Pox, Measles, Rubella





- Past Surgical History


Female  Surgical History: Reports:  Section, Hysterectomy





Social & Family History





- Family History


Family Medical History: Noncontributory





- Tobacco Use


Smoking Status *Q: Current Every Day Smoker


Years of Tobacco use: 36


Packs/Tins Daily: 0.5


Used Tobacco, but Quit: No


Second Hand Smoke Exposure: No





- Caffeine Use


Caffeine Use: Reports: Coffee, Energy Drinks, Soda





- Alcohol Use


Days Per Week of Alcohol Use: 0





- Recreational Drug Use


Recreational Drug Use: No





ED ROS GENERAL





- Review of Systems


Review Of Systems: ROS reveals no pertinent complaints other than HPI.





ED EXAM, GENERAL





- Physical Exam


Exam: See Below


Exam Limited By: No Limitations


General Appearance: Alert, WD/WN, No Apparent Distress


Eye Exam: Bilateral Eye: EOMI, PERRL


Ears: Normal External Exam


Nose: Normal Inspection


Throat/Mouth: Normal Inspection, Other (poor dentition)


Head: Atraumatic, Normocephalic


Neck: Normal Inspection


Respiratory/Chest: No Respiratory Distress, Lungs Clear, Normal Breath Sounds, 

No Accessory Muscle Use, Other (patient gasps in pain when I very, very lightly 

palpated the anterior left upper chest wall where she complained of pain. 

Patient states the pain is very severe and hurts every time she touches that 

area - no bruising to area - no erythema to area- no redness of area - no signs 

of trauma to area - no findings except disproportionate pain on very very light 

touch to area)


Cardiovascular: Normal Peripheral Pulses, Regular Rate, Rhythm


Peripheral Pulses: 2+: Dorsalis Pedis (L), Dorsalis Pedis (R)


GI/Abdominal: Normal Bowel Sounds


Back Exam: Normal Inspection


Extremities: Normal Inspection, Normal Range of Motion, Non-Tender, No Pedal 

Edema, Normal Capillary Refill


Neurological: Alert, Oriented, CN II-XII Intact


Psychiatric: Tearful (Tearful only when discussing her chronic pain and recent 

acute chest wall pain - no history of trauma or fall or excessive use of muscles

)


Skin Exam: Warm, Dry, Intact, Normal Color, No Rash


Lymphatic: No Adenopathy





Course





- Vital Signs


Last Recorded V/S: 


 Last Vital Signs











Temp  36.3 C   18 03:17


 


Pulse  87   18 03:36


 


Resp  20   18 03:36


 


BP  151/74 H  18 03:36


 


Pulse Ox  97   18 03:36














- Orders/Labs/Meds


Orders: 


 Active Orders 24 hr











 Category Date Time Status


 


 RT Aerosol Therapy [RC] ASDIRECTED Care  18 04:26 Active


 


 LISET SCREEN,REFLEXIVE [REF] Stat Lab  18 05:00 Received


 


 RHEUMATOID FACTOR [REF] Stat Lab  18 05:00 Received











Labs: 


 Laboratory Tests











  18 Range/Units





  04:45 05:00 05:00 


 


WBC     (4.0-11.0)  K/uL


 


RBC     (3.80-5.80)  M/uL


 


Hgb     (11.5-16.5)  g/dL


 


Hct     (37.0-47.0)  %


 


MCV     (76-96)  fL


 


MCH     (27.0-32.0)  pg


 


MCHC     (31.0-35.0)  g/dL


 


RDW     (11.0-16.0)  %


 


Plt Count     (150-500)  K/uL


 


MPV     (6.0-10.0)  fL


 


Neut % (Auto)     (45.0-70.0)  %


 


Lymph % (Auto)     (20.0-40.0)  %


 


Mono % (Auto)     (3.0-10.0)  %


 


Eos % (Auto)     (1.0-5.0)  %


 


Baso % (Auto)     (0.0-0.5)  %


 


Neut # (Auto)     (2.00-7.50)  K/uL


 


Lymph # (Auto)     (1.50-4.00)  K/uL


 


Mono # (Auto)     (0.20-0.80)  K/uL


 


Eos # (Auto)     (0.04-0.40)  K/uL


 


Baso # (Auto)     (0.02-0.10)  K/uL


 


ESR     (0-30)  mm/hr


 


D-Dimer, Quantitative   395   (0-400)  ng/mL


 


Sodium     (136-145)  mmol/L


 


Potassium     (3.5-5.1)  mmol/L


 


Chloride     ()  mmol/L


 


Carbon Dioxide     (21.0-32.0)  mmol/L


 


Anion Gap     (5.0-15.0)  mmol/L


 


BUN     (8-26)  mg/dL


 


Creatinine     (0.55-1.02)  mg/dL


 


Est Cr Clr Drug Dosing     mL/min


 


Estimated GFR (MDRD)     (>60)  MLS/MIN


 


BUN/Creatinine Ratio     (6-25)  


 


Glucose     ()  mg/dL


 


Lactic Acid     (0.90-1.70)  mmol/L


 


Calcium     (8.5-10.1)  mg/dL


 


Creatine Kinase     ()  U/L


 


Troponin I    < 0.017  (0.000-0.060)  ng/mL


 


C-Reactive Protein     (0.0-3.0)  mg/L


 


Urine Color     


 


Urine Appearance     (CLEAR)  


 


Urine pH     (5.0-8.0)  


 


Ur Specific Gravity     (1.003-1.030)  


 


Urine Protein     (NEGATIVE)  mg/dL


 


Urine Glucose (UA)     (NEGATIVE)  mg/dL


 


Urine Ketones     (NEGATIVE)  mg/dL


 


Urine Occult Blood     (NEGATIVE)  


 


Urine Nitrite     (NEGATIVE)  


 


Urine Bilirubin     (NEGATIVE)  


 


Urine Urobilinogen     (0.2-1.0)  E.U./dL


 


Ur Leukocyte Esterase     (NEGATIVE)  


 


Urine RBC     /HPF


 


Urine WBC     /HPF


 


Ur Squamous Epith Cells     /HPF


 


Urine Opiates Screen  Negative    (NEGATIVE)  


 


Ur Oxycodone Screen  Negative    (NEGATIVE)  


 


Urine Methadone Screen  Negative    (NEGATIVE)  


 


U Acetaminophen Screen  Positive H    (NEGATIVE)  


 


Ur Barbiturates Screen  Negative    (NEGATIVE)  


 


Ur Tricyclics Screen  Negative    (NEGATIVE)  


 


Ur Phencyclidine Scrn  Negative    (NEGATIVE)  


 


Ur Amphetamine Screen  Negative    (NEGATIVE)  


 


U Methamphetamines Scrn  Negative    (NEGATIVE)  


 


U Benzodiazepines Scrn  Negative    (NEGATIVE)  


 


U Cocaine Metab Screen  Negative    (NEGATIVE)  


 


U Marijuana (THC) Screen  Negative    (NEGATIVE)  














  18 Range/Units





  05:00 05:00 05:00 


 


WBC     (4.0-11.0)  K/uL


 


RBC     (3.80-5.80)  M/uL


 


Hgb     (11.5-16.5)  g/dL


 


Hct     (37.0-47.0)  %


 


MCV     (76-96)  fL


 


MCH     (27.0-32.0)  pg


 


MCHC     (31.0-35.0)  g/dL


 


RDW     (11.0-16.0)  %


 


Plt Count     (150-500)  K/uL


 


MPV     (6.0-10.0)  fL


 


Neut % (Auto)     (45.0-70.0)  %


 


Lymph % (Auto)     (20.0-40.0)  %


 


Mono % (Auto)     (3.0-10.0)  %


 


Eos % (Auto)     (1.0-5.0)  %


 


Baso % (Auto)     (0.0-0.5)  %


 


Neut # (Auto)     (2.00-7.50)  K/uL


 


Lymph # (Auto)     (1.50-4.00)  K/uL


 


Mono # (Auto)     (0.20-0.80)  K/uL


 


Eos # (Auto)     (0.04-0.40)  K/uL


 


Baso # (Auto)     (0.02-0.10)  K/uL


 


ESR    15  (0-30)  mm/hr


 


D-Dimer, Quantitative     (0-400)  ng/mL


 


Sodium     (136-145)  mmol/L


 


Potassium     (3.5-5.1)  mmol/L


 


Chloride     ()  mmol/L


 


Carbon Dioxide     (21.0-32.0)  mmol/L


 


Anion Gap     (5.0-15.0)  mmol/L


 


BUN     (8-26)  mg/dL


 


Creatinine     (0.55-1.02)  mg/dL


 


Est Cr Clr Drug Dosing     mL/min


 


Estimated GFR (MDRD)     (>60)  MLS/MIN


 


BUN/Creatinine Ratio     (6-25)  


 


Glucose     ()  mg/dL


 


Lactic Acid  2.83 H    (0.90-1.70)  mmol/L


 


Calcium     (8.5-10.1)  mg/dL


 


Creatine Kinase   126   ()  U/L


 


Troponin I     (0.000-0.060)  ng/mL


 


C-Reactive Protein   3.3 H   (0.0-3.0)  mg/L


 


Urine Color     


 


Urine Appearance     (CLEAR)  


 


Urine pH     (5.0-8.0)  


 


Ur Specific Gravity     (1.003-1.030)  


 


Urine Protein     (NEGATIVE)  mg/dL


 


Urine Glucose (UA)     (NEGATIVE)  mg/dL


 


Urine Ketones     (NEGATIVE)  mg/dL


 


Urine Occult Blood     (NEGATIVE)  


 


Urine Nitrite     (NEGATIVE)  


 


Urine Bilirubin     (NEGATIVE)  


 


Urine Urobilinogen     (0.2-1.0)  E.U./dL


 


Ur Leukocyte Esterase     (NEGATIVE)  


 


Urine RBC     /HPF


 


Urine WBC     /HPF


 


Ur Squamous Epith Cells     /HPF


 


Urine Opiates Screen     (NEGATIVE)  


 


Ur Oxycodone Screen     (NEGATIVE)  


 


Urine Methadone Screen     (NEGATIVE)  


 


U Acetaminophen Screen     (NEGATIVE)  


 


Ur Barbiturates Screen     (NEGATIVE)  


 


Ur Tricyclics Screen     (NEGATIVE)  


 


Ur Phencyclidine Scrn     (NEGATIVE)  


 


Ur Amphetamine Screen     (NEGATIVE)  


 


U Methamphetamines Scrn     (NEGATIVE)  


 


U Benzodiazepines Scrn     (NEGATIVE)  


 


U Cocaine Metab Screen     (NEGATIVE)  


 


U Marijuana (THC) Screen     (NEGATIVE)  














  18 Range/Units





  05:00 05:00 05:00 


 


WBC  7.4    (4.0-11.0)  K/uL


 


RBC  4.13    (3.80-5.80)  M/uL


 


Hgb  11.2 L    (11.5-16.5)  g/dL


 


Hct  33.0 L    (37.0-47.0)  %


 


MCV  80    (76-96)  fL


 


MCH  27.1    (27.0-32.0)  pg


 


MCHC  33.9    (31.0-35.0)  g/dL


 


RDW  14.7    (11.0-16.0)  %


 


Plt Count  171    (150-500)  K/uL


 


MPV  12.9 H    (6.0-10.0)  fL


 


Neut % (Auto)  76.8 H    (45.0-70.0)  %


 


Lymph % (Auto)  18.3 L    (20.0-40.0)  %


 


Mono % (Auto)  4.3    (3.0-10.0)  %


 


Eos % (Auto)  0.3 L    (1.0-5.0)  %


 


Baso % (Auto)  0.3    (0.0-0.5)  %


 


Neut # (Auto)  5.67    (2.00-7.50)  K/uL


 


Lymph # (Auto)  1.35 L    (1.50-4.00)  K/uL


 


Mono # (Auto)  0.32    (0.20-0.80)  K/uL


 


Eos # (Auto)  0.02 L    (0.04-0.40)  K/uL


 


Baso # (Auto)  0.02    (0.02-0.10)  K/uL


 


ESR     (0-30)  mm/hr


 


D-Dimer, Quantitative     (0-400)  ng/mL


 


Sodium   140   (136-145)  mmol/L


 


Potassium   4.2   (3.5-5.1)  mmol/L


 


Chloride   103   ()  mmol/L


 


Carbon Dioxide   21.3   (21.0-32.0)  mmol/L


 


Anion Gap   19.9 H   (5.0-15.0)  mmol/L


 


BUN   16   (8-26)  mg/dL


 


Creatinine   1.03 H   (0.55-1.02)  mg/dL


 


Est Cr Clr Drug Dosing   55.17   mL/min


 


Estimated GFR (MDRD)   56 L   (>60)  MLS/MIN


 


BUN/Creatinine Ratio   15.5   (6-25)  


 


Glucose   144 H   ()  mg/dL


 


Lactic Acid     (0.90-1.70)  mmol/L


 


Calcium   9.1   (8.5-10.1)  mg/dL


 


Creatine Kinase     ()  U/L


 


Troponin I     (0.000-0.060)  ng/mL


 


C-Reactive Protein     (0.0-3.0)  mg/L


 


Urine Color    Yellow  


 


Urine Appearance    Clear  (CLEAR)  


 


Urine pH    6.0  (5.0-8.0)  


 


Ur Specific Gravity    1.020  (1.003-1.030)  


 


Urine Protein    Negative  (NEGATIVE)  mg/dL


 


Urine Glucose (UA)    Negative  (NEGATIVE)  mg/dL


 


Urine Ketones    Negative  (NEGATIVE)  mg/dL


 


Urine Occult Blood    Negative  (NEGATIVE)  


 


Urine Nitrite    Negative  (NEGATIVE)  


 


Urine Bilirubin    Negative  (NEGATIVE)  


 


Urine Urobilinogen    0.2  (0.2-1.0)  E.U./dL


 


Ur Leukocyte Esterase    Negative  (NEGATIVE)  


 


Urine RBC    Not seen  /HPF


 


Urine WBC    Not seen  /HPF


 


Ur Squamous Epith Cells    Rare  /HPF


 


Urine Opiates Screen     (NEGATIVE)  


 


Ur Oxycodone Screen     (NEGATIVE)  


 


Urine Methadone Screen     (NEGATIVE)  


 


U Acetaminophen Screen     (NEGATIVE)  


 


Ur Barbiturates Screen     (NEGATIVE)  


 


Ur Tricyclics Screen     (NEGATIVE)  


 


Ur Phencyclidine Scrn     (NEGATIVE)  


 


Ur Amphetamine Screen     (NEGATIVE)  


 


U Methamphetamines Scrn     (NEGATIVE)  


 


U Benzodiazepines Scrn     (NEGATIVE)  


 


U Cocaine Metab Screen     (NEGATIVE)  


 


U Marijuana (THC) Screen     (NEGATIVE)  











Meds: 


Medications














Discontinued Medications














Generic Name Dose Route Start Last Admin





  Trade Name Freq  PRN Reason Stop Dose Admin


 


Albuterol/Ipratropium  3 ml  18 04:25  18 04:22





  Duoneb 3.0-0.5 Mg/3 Ml  NEB   3 ml





  Q4H PRN   Administration





  Shortness of Breath   


 


Ketorolac Tromethamine  30 mg  18 04:21  18 04:32





  Toradol  IM  18 04:22  30 mg





  ONETIME ONE   Administration














Departure





- Departure


Time of Disposition: 06:00


Disposition: Home, Self-Care 01


Condition: Good


Clinical Impression: 


 Chest wall pain, chronic, Drug-seeking behavior, Shortness of breath








- Discharge Information


Forms:  ED Department Discharge


Additional Instructions: 


Take provided Aleve as directed: 1 tablet by mouth every 12 hours as needed for 

pain.  Follow up in clinic later this afternoon if symptoms persist.  Call with 

any questions.





- Problem List Review


Problem List Initiated/Reviewed/Updated: Yes





- My Orders


Last 24 Hours: 


My Active Orders





18 04:26


RT Aerosol Therapy [RC] ASDIRECTED 





18 05:00


LISET SCREEN,REFLEXIVE [REF] Stat 


RHEUMATOID FACTOR [REF] Stat 














- Assessment/Plan


Last 24 Hours: 


My Active Orders





18 04:26


RT Aerosol Therapy [RC] ASDIRECTED 





18 05:00


LISET SCREEN,REFLEXIVE [REF] Stat 


RHEUMATOID FACTOR [REF] Stat 











Plan: 





Patient counseled on supportive and conservative care of chest wall pain. 

Patient agreeable to plan of care and follow up if symptoms persist or worsen. 

Patient given aleve samples for use for chest wall pain. Patient lab results 

discussed with patient extensively. Patient to f/u in clinic as routine and as 

needed.

## 2018-03-06 NOTE — EDM.PDOC
ED HPI GENERAL MEDICAL PROBLEM





- General


Chief Complaint: General


Stated Complaint: short of breath


Time Seen by Provider: 18 19:40


Source of Information: Reports: Patient


History Limitations: Reports: No Limitations





- History of Present Illness


INITIAL COMMENTS - FREE TEXT/NARRATIVE: 





This is a 51yo F here for concerns of shortness of breath. Patient states she 

started having trouble breathing and called EMS. EMS brought her in with 

saturation in the high 95-98% oxygenation. BP was elevated at time of arrival 

and patient states she hurts all over. Patient states she feels achy and 

painful in her whole body and shortness of breath secondary. She states she did 

use her breathing medication at home. Denies any other concerns or issues. 


Onset: Sudden


Duration: Hour(s):


Severity: Moderate


Improves with: Reports: None


Worsens with: Reports: None


Associated Symptoms: Reports: Shortness of Breath, Other (pain everywhere)


  ** Generalized


Pain Score (Numeric/FACES): 8





- Related Data


 Allergies











Allergy/AdvReac Type Severity Reaction Status Date / Time


 


adhesive tape Allergy  Rash Verified 18 03:22


 


celecoxib [From Celebrex] Allergy  Nausea and Verified 18 03:22





   Vomiting  











Home Meds: 


 Home Meds





Ipratropium/Albuterol Sulfate [Duoneb 0.5 MG-3 MG/3 ML] 1 - 2 puff INH Q4H PRN 

13 [History]


Omeprazole 20 mg PO ACBREAKFAST 13 [History]


risperiDONE [RisperiDAL M-Tab] 2 mg PO QPM 13 [History]


Acetaminophen [Tylenol Extra Strength] 500 mg PO Q6H 11/21/15 [History]


Budesonide/Formoterol [Symbicort 160-4.5 MCG] 2 puff INH BID 11/21/15 [History]


Gabapentin [Neurontin] 1 tab PO TID 11/21/15 [History]


QUEtiapine [SEROquel] 100 tab PO QAM 11/21/15 [History]


Spironolactone [Aldactone] 3 tab PO BID 11/21/15 [History]


buPROPion [Wellbutrin SR] 100 mg PO BID 11/21/15 [History]


hydrOXYzine HCl [hydrOXYzine] 1 - 3 cap PO TID 11/21/15 [History]


rOPINIRole [Requip] 1.5 tab PO BEDTIME 11/21/15 [History]


tiZANidine [Zanaflex] 4 mg PO Q6H 16 [History]


Zolpidem Tartrate [Ambien Cr] 12.5 tab PO QPM 17 [History]


ClonazePAM [KlonoPIN] 1 mg PO BID 12/10/17 [History]


QUEtiapine Fumarate [Quetiapine Fumarate] 400 mg PO QPM 12/10/17 [History]


traZODone HCl [Trazodone HCl] 100 mg PO QPM 12/10/17 [History]


sitaGLIPtin Phos/Metformin HCl [Janumet 50-1,000 MG] 1 tab PO BID 18 [

History]











Past Medical History


HEENT History: Reports: Impaired Vision, Other (See Below)


Other HEENT History: glasses for reading,


Cardiovascular History: Reports: Hypertension


Respiratory History: Reports: Asthma, Bronchitis, Recurrent, Pneumonia, 

Recurrent, Other (See Below)


Other Respiratory History: Emphysema


Gastrointestinal History: Reports: Bowel Obstruction, GERD, Irritable Bowel 

Syndrome


Genitourinary History: Reports: UTI, Recurrent


OB/GYN History: Reports: Dysfunctional Uterine Bleeding, Pregnancy


Musculoskeletal History: Reports: Back Pain, Chronic, Other (See Below)


Other Musculoskeletal History: back / butt pain, shoots down left leg


Neurological History: Reports: Migraines


Psychiatric History: Reports: Anxiety, Depression, Suicide Attempt, Suicidal 

Ideation


Endocrine/Metabolic History: Reports: Diabetes, Type II, Obesity/BMI 30+


Hematologic History: Reports: Blood Transfusion(s), Iron Deficiency


Dermatologic History: Reports: Eczema, Other (See Below)


Other Dermatologic History: Allergy to paper tape





- Infectious Disease History


Infectious Disease History: Reports: Chicken Pox, Measles, Rubella





- Past Surgical History


Female  Surgical History: Reports:  Section, Hysterectomy





Social & Family History





- Family History


Family Medical History: Noncontributory





- Tobacco Use


Smoking Status *Q: Current Every Day Smoker


Years of Tobacco use: 36


Packs/Tins Daily: 0.5


Used Tobacco, but Quit: No


Second Hand Smoke Exposure: No





- Caffeine Use


Caffeine Use: Reports: Coffee, Energy Drinks, Soda





- Alcohol Use


Days Per Week of Alcohol Use: 0





- Recreational Drug Use


Recreational Drug Use: No





ED ROS GENERAL





- Review of Systems


Review Of Systems: ROS reveals no pertinent complaints other than HPI.





ED EXAM, GENERAL





- Physical Exam


Exam: See Below


Exam Limited By: No Limitations


General Appearance: Alert, WD/WN, No Apparent Distress


Eye Exam: Bilateral Eye: EOMI, PERRL


Ears: Normal External Exam


Nose: Normal Inspection


Throat/Mouth: Other (poor dentition)


Head: Atraumatic, Normocephalic


Neck: Normal Inspection


Respiratory/Chest: No Respiratory Distress, Lungs Clear, Normal Breath Sounds


Cardiovascular: Normal Peripheral Pulses, Regular Rate, Rhythm


GI/Abdominal: Normal Bowel Sounds


Back Exam: Normal Inspection, Full Range of Motion


Extremities: Normal Inspection, Normal Range of Motion, Non-Tender, No Pedal 

Edema, Normal Capillary Refill


Neurological: Alert, Oriented, CN II-XII Intact


Psychiatric: Tearful (Patient tearful when talking about her pain throughout 

her body)





Course





- Vital Signs


Last Recorded V/S: 





 Last Vital Signs











Temp  36.1 C   18 19:40


 


Pulse  88   18 19:40


 


Resp  20   18 19:40


 


BP  146/99 H  18 19:40


 


Pulse Ox  96   18 19:40














- Orders/Labs/Meds


Orders: 





 Active Orders 24 hr











 Category Date Time Status


 


 EKG Documentation Completion [RC] ASDIRECTED Care  18 19:48 Active


 


 RT Aerosol Therapy [RC] ASDIRECTED Care  18 19:54 Active


 


 Chest 1V Frontal [CR] Stat Exams  18 19:49 Ordered


 


 Albuterol/Ipratropium [DuoNeb 3.0-0.5 MG/3 ML] Med  18 19:54 Active





 3 ml NEB Q2H PRN   








 Medication Orders





Albuterol/Ipratropium (Duoneb 3.0-0.5 Mg/3 Ml)  3 ml NEB Q2H PRN


   PRN Reason: Shortness of Breath


   Last Admin: 18 19:50  Dose: 3 ml








Labs: 





 Laboratory Tests











  18 Range/Units





  20:20 20:20 20:20 


 


WBC  6.4  D    (4.0-11.0)  K/uL


 


RBC  4.22    (3.80-5.80)  M/uL


 


Hgb  11.2 L    (11.5-16.5)  g/dL


 


Hct  34.6 L    (37.0-47.0)  %


 


MCV  82    (76-96)  fL


 


MCH  26.5 L    (27.0-32.0)  pg


 


MCHC  32.4    (31.0-35.0)  g/dL


 


RDW  14.8    (11.0-16.0)  %


 


Plt Count  143 L    (150-500)  K/uL


 


MPV  10.8 H    (6.0-10.0)  fL


 


Neut % (Auto)  52.7    (45.0-70.0)  %


 


Lymph % (Auto)  39.9    (20.0-40.0)  %


 


Mono % (Auto)  5.5    (3.0-10.0)  %


 


Eos % (Auto)  1.4    (1.0-5.0)  %


 


Baso % (Auto)  0.5    (0.0-0.5)  %


 


Neut # (Auto)  3.39    (2.00-7.50)  K/uL


 


Lymph # (Auto)  2.56    (1.50-4.00)  K/uL


 


Mono # (Auto)  0.35    (0.20-0.80)  K/uL


 


Eos # (Auto)  0.09    (0.04-0.40)  K/uL


 


Baso # (Auto)  0.03    (0.02-0.10)  K/uL


 


VBG pH    7.44 H  (7.31-7.41)  


 


Sodium   144   (136-145)  mmol/L


 


Potassium   3.9   (3.5-5.1)  mmol/L


 


Chloride   105   ()  mmol/L


 


Carbon Dioxide   23.8   (21.0-32.0)  mmol/L


 


Anion Gap   19.1 H   (5.0-15.0)  mmol/L


 


BUN   15   (8-26)  mg/dL


 


Creatinine   1.14 H D   (0.55-1.02)  mg/dL


 


Est Cr Clr Drug Dosing   49.85   mL/min


 


Estimated GFR (MDRD)   50 L   (>60)  MLS/MIN


 


BUN/Creatinine Ratio   13.2   (6-25)  


 


Glucose   119 H   ()  mg/dL


 


Lactic Acid     (0.90-1.70)  mmol/L


 


Calcium   8.6   (8.5-10.1)  mg/dL


 


Total Bilirubin   0.4   (0.0-1.0)  mg/dL


 


AST   23   (15-37)  U/L


 


ALT   32   (12-78)  U/L


 


Alkaline Phosphatase   80   ()  U/L


 


Troponin I   < 0.017   (0.000-0.060)  ng/mL


 


B-Natriuretic Peptide   345 H   (0-125)  pg/mL


 


Total Protein   6.9   (6.4-8.2)  g/dL


 


Albumin   3.4   (3.4-5.0)  g/dL


 


Globulin   3.5   (2.2-4.2)  g/dL


 


Albumin/Globulin Ratio   1.0   (0.8-2.0)  


 


TSH, Ultra Sensitive   3.090  D   (0.358-3.740)  uIU/mL














  18 Range/Units





  20:20 


 


WBC   (4.0-11.0)  K/uL


 


RBC   (3.80-5.80)  M/uL


 


Hgb   (11.5-16.5)  g/dL


 


Hct   (37.0-47.0)  %


 


MCV   (76-96)  fL


 


MCH   (27.0-32.0)  pg


 


MCHC   (31.0-35.0)  g/dL


 


RDW   (11.0-16.0)  %


 


Plt Count   (150-500)  K/uL


 


MPV   (6.0-10.0)  fL


 


Neut % (Auto)   (45.0-70.0)  %


 


Lymph % (Auto)   (20.0-40.0)  %


 


Mono % (Auto)   (3.0-10.0)  %


 


Eos % (Auto)   (1.0-5.0)  %


 


Baso % (Auto)   (0.0-0.5)  %


 


Neut # (Auto)   (2.00-7.50)  K/uL


 


Lymph # (Auto)   (1.50-4.00)  K/uL


 


Mono # (Auto)   (0.20-0.80)  K/uL


 


Eos # (Auto)   (0.04-0.40)  K/uL


 


Baso # (Auto)   (0.02-0.10)  K/uL


 


VBG pH   (7.31-7.41)  


 


Sodium   (136-145)  mmol/L


 


Potassium   (3.5-5.1)  mmol/L


 


Chloride   ()  mmol/L


 


Carbon Dioxide   (21.0-32.0)  mmol/L


 


Anion Gap   (5.0-15.0)  mmol/L


 


BUN   (8-26)  mg/dL


 


Creatinine   (0.55-1.02)  mg/dL


 


Est Cr Clr Drug Dosing   mL/min


 


Estimated GFR (MDRD)   (>60)  MLS/MIN


 


BUN/Creatinine Ratio   (6-25)  


 


Glucose   ()  mg/dL


 


Lactic Acid  2.90 H  (0.90-1.70)  mmol/L


 


Calcium   (8.5-10.1)  mg/dL


 


Total Bilirubin   (0.0-1.0)  mg/dL


 


AST   (15-37)  U/L


 


ALT   (12-78)  U/L


 


Alkaline Phosphatase   ()  U/L


 


Troponin I   (0.000-0.060)  ng/mL


 


B-Natriuretic Peptide   (0-125)  pg/mL


 


Total Protein   (6.4-8.2)  g/dL


 


Albumin   (3.4-5.0)  g/dL


 


Globulin   (2.2-4.2)  g/dL


 


Albumin/Globulin Ratio   (0.8-2.0)  


 


TSH, Ultra Sensitive   (0.358-3.740)  uIU/mL











Meds: 





Medications











Generic Name Dose Route Start Last Admin





  Trade Name Freq  PRN Reason Stop Dose Admin


 


Albuterol/Ipratropium  3 ml  18 19:54  18 19:50





  Duoneb 3.0-0.5 Mg/3 Ml  NEB   3 ml





  Q2H PRN   Administration





  Shortness of Breath   














- Re-Assessments/Exams


Free Text/Narrative Re-Assessment/Exam: 


Patient symptoms improved greatly after use of nebulizer but continues to state 

she needs something for pain. 








Departure





- Departure


Time of Disposition: 21:00


Disposition: Home, Self-Care 01


Condition: Good


Clinical Impression: 


COPD (chronic obstructive pulmonary disease)


Qualifiers:


 COPD type: unspecified COPD Qualified Code(s): J44.9 - Chronic obstructive 

pulmonary disease, unspecified








- Discharge Information


Forms:  ED Department Discharge


Additional Instructions: 


 prescriptions from drug store tomorrow and begin taking as directed by 

Dr. Portillo.  May take provided methocarbamol as directed: 1 tablet by mouth every 

6 hours as needed for muscle pain.  Drink plenty of fluids and get plenty of 

rest, activity may be as tolerated.  Follow up in clinic if needed.  Call with 

any questions.





- My Orders


Last 24 Hours: 





My Active Orders





18 19:48


EKG Documentation Completion [RC] ASDIRECTED 





18 19:49


Chest 1V Frontal [CR] Stat 





18 19:54


RT Aerosol Therapy [RC] ASDIRECTED 


Albuterol/Ipratropium [DuoNeb 3.0-0.5 MG/3 ML]   3 ml NEB Q2H PRN 














- Assessment/Plan


Last 24 Hours: 





My Active Orders





18 19:48


EKG Documentation Completion [RC] ASDIRECTED 





18 19:49


Chest 1V Frontal [CR] Stat 





18 19:54


RT Aerosol Therapy [RC] ASDIRECTED 


Albuterol/Ipratropium [DuoNeb 3.0-0.5 MG/3 ML]   3 ml NEB Q2H PRN

## 2018-03-06 NOTE — CR
FRONTAL VIEW OF THE CHEST, 03/05/18



No priors.  



The heart size is at the upper limits of normal.  



The patient has taken a poor inspiration.  



The pulmonary vasculature does appear mildly prominent.  It is probably 
accentuated by the poor inspiration.  



The lungs appear clear.  The No pneumothorax.  No pleural effusions.  



[id number]  
[facility]

## 2018-03-06 NOTE — EDM.PDOC
ED HPI GENERAL MEDICAL PROBLEM





- General


Chief Complaint: Respiratory Problem


Stated Complaint: short of breath


Time Seen by Provider: 18 08:25


Source of Information: Reports: Patient, Family, RN


History Limitations: Reports: No Limitations





- History of Present Illness


INITIAL COMMENTS - FREE TEXT/NARRATIVE: 


52 yr female presents with shortness of breath.  States she was in the ER a few 

hours ago and had all sorts of tests done and everything has been ok.  EKG, 

chest x-ray, labs completed and normal results.  Pt was seen by Dr. Portillo and 

released and instructed to take Robaxin and Aleve.  She did that after getting 

home and she slept for a couple hours and shortness of breath started with body 

aches.  SpO2 is normal and vital signs are normal.  She did have a couple 

breathing treatments.  Lungs have been clear.  Bowel sounds active.





Onset: Today


  ** Generalized


Pain Score (Numeric/FACES): 8





- Related Data


 Allergies











Allergy/AdvReac Type Severity Reaction Status Date / Time


 


adhesive tape Allergy  Rash Verified 18 03:22


 


celecoxib [From Celebrex] Allergy  Nausea and Verified 18 03:22





   Vomiting  











Home Meds: 


 Home Meds





Ipratropium/Albuterol Sulfate [Duoneb 0.5 MG-3 MG/3 ML] 1 - 2 puff INH Q4H PRN 

13 [History]


Omeprazole 20 mg PO ACBREAKFAST 13 [History]


risperiDONE [RisperiDAL M-Tab] 2 mg PO QPM 13 [History]


Acetaminophen [Tylenol Extra Strength] 500 mg PO Q6H 11/21/15 [History]


Budesonide/Formoterol [Symbicort 160-4.5 MCG] 2 puff INH BID 11/21/15 [History]


Gabapentin [Neurontin] 1 tab PO TID 11/21/15 [History]


QUEtiapine [SEROquel] 100 tab PO QAM 11/21/15 [History]


Spironolactone [Aldactone] 3 tab PO BID 11/21/15 [History]


buPROPion [Wellbutrin SR] 100 mg PO BID 11/21/15 [History]


hydrOXYzine HCl [hydrOXYzine] 1 - 3 cap PO TID 11/21/15 [History]


rOPINIRole [Requip] 1.5 tab PO BEDTIME 11/21/15 [History]


tiZANidine [Zanaflex] 4 mg PO Q6H 16 [History]


Zolpidem Tartrate [Ambien Cr] 12.5 tab PO QPM 17 [History]


ClonazePAM [KlonoPIN] 1 mg PO BID 12/10/17 [History]


QUEtiapine Fumarate [Quetiapine Fumarate] 400 mg PO QPM 12/10/17 [History]


traZODone HCl [Trazodone HCl] 100 mg PO QPM 12/10/17 [History]


sitaGLIPtin Phos/Metformin HCl [Janumet 50-1,000 MG] 1 tab PO BID 18 [

History]











Past Medical History


HEENT History: Reports: Impaired Vision, Other (See Below)


Other HEENT History: glasses for reading,


Cardiovascular History: Reports: Hypertension


Respiratory History: Reports: Asthma, Bronchitis, Recurrent, Pneumonia, 

Recurrent, Other (See Below)


Other Respiratory History: Emphysema


Gastrointestinal History: Reports: Bowel Obstruction, GERD, Irritable Bowel 

Syndrome


Genitourinary History: Reports: UTI, Recurrent


OB/GYN History: Reports: Dysfunctional Uterine Bleeding, Pregnancy


Musculoskeletal History: Reports: Back Pain, Chronic, Other (See Below)


Other Musculoskeletal History: back / butt pain, shoots down left leg


Neurological History: Reports: Migraines


Psychiatric History: Reports: Anxiety, Depression, Suicide Attempt, Suicidal 

Ideation


Endocrine/Metabolic History: Reports: Diabetes, Type II, Obesity/BMI 30+


Hematologic History: Reports: Blood Transfusion(s), Iron Deficiency


Dermatologic History: Reports: Eczema, Other (See Below)


Other Dermatologic History: Allergy to paper tape





- Infectious Disease History


Infectious Disease History: Reports: Chicken Pox, Measles, Rubella





- Past Surgical History


Female  Surgical History: Reports:  Section, Hysterectomy





Social & Family History





- Family History


Family Medical History: Noncontributory





- Tobacco Use


Smoking Status *Q: Current Every Day Smoker


Years of Tobacco use: 36


Packs/Tins Daily: 0.5


Used Tobacco, but Quit: No


Second Hand Smoke Exposure: No





- Caffeine Use


Caffeine Use: Reports: Coffee, Energy Drinks, Soda





- Alcohol Use


Days Per Week of Alcohol Use: 0





- Recreational Drug Use


Recreational Drug Use: No





ED ROS GENERAL





- Review of Systems


Review Of Systems: See Below


Respiratory: Reports: Shortness of Breath


Musculoskeletal: Reports: Other (achy all over)


Psychiatric: Reports: Anxiety





ED EXAM, GENERAL





- Physical Exam


Exam: See Below


Exam Limited By: No Limitations


General Appearance: Alert, No Apparent Distress


Ears: Hearing Grossly Normal


Throat/Mouth: Normal Lips, Normal Voice, No Airway Compromise


Head: Atraumatic, Normocephalic


Neck: Supple, Non-Tender


Respiratory/Chest: Lungs Clear, Normal Breath Sounds


Cardiovascular: Normal Peripheral Pulses, Regular Rate, Rhythm, No Edema


GI/Abdominal: Normal Bowel Sounds, Soft, Non-Tender


Extremities: Normal Inspection, Normal Range of Motion, No Pedal Edema


Neurological: Alert, Oriented, Normal Cognition


Skin Exam: Warm, Dry, Normal Color





Course





- Vital Signs


Last Recorded V/S: 


 Last Vital Signs











Temp  98.4 F   18 13:56


 


Pulse  89   18 13:56


 


Resp  18   18 13:56


 


BP  139/78   18 13:56


 


Pulse Ox  96   18 13:56














- Orders/Labs/Meds


Orders: 


 Active Orders 24 hr











 Category Date Time Status


 


 Cardiac Monitoring [RC] .As Directed Care  18 09:09 Active


 


 EKG Documentation Completion [RC] ASDIRECTED Care  18 08:47 Active


 


 Oxygen Therapy [RC] PRN Care  18 09:10 Active


 


 Nitroglycerin [Nitrostat] Med  18 09:08 Active





 0.4 mg SL Q5M PRN   


 


 Sodium Chloride 0.9% [Saline Flush] Med  18 08:48 Active





 10 ml FLUSH ASDIRECTED PRN   


 


 Saline Lock Insert [OM.PC] Routine Oth  18 08:48 Ordered








 Medication Orders





Acetaminophen (Tylenol)  650 mg PO Q4H PRN


   PRN Reason: analgesia/fever


Sodium Chloride (Normal Saline)  1,000 mls @ 250 mls/hr IV ASDIRECTED YADIRA


   Last Admin: 18 10:54  Dose: 250 mls/hr


Ketorolac Tromethamine (Toradol)  30 mg IM Q6H [facility]


   Stop: 18 10:00


   Last Admin: 18 10:30  Dose: 30 mg


Nitroglycerin (Nitrostat)  0.4 mg SL Q5M PRN


   PRN Reason: Chest Pain


   Stop: 18 09:09


Sodium Chloride (Saline Flush)  10 ml FLUSH ASDIRECTED PRN


   PRN Reason: Keep Vein Open


   Last Admin: 18 09:15  Dose: 10 ml








Labs: 


 Laboratory Tests











  18 Range/Units





  09:00 09:00 


 


Sodium   142  (136-145)  mmol/L


 


Potassium   4.1  (3.5-5.1)  mmol/L


 


Chloride   103  ()  mmol/L


 


Carbon Dioxide   22.1  (21.0-32.0)  mmol/L


 


Anion Gap   21.0 H  (5.0-15.0)  mmol/L


 


BUN   13  (8-26)  mg/dL


 


Creatinine   1.10 H  (0.55-1.02)  mg/dL


 


Est Cr Clr Drug Dosing   TNP  


 


Estimated GFR (MDRD)   52 L  (>60)  MLS/MIN


 


BUN/Creatinine Ratio   11.8  (6-25)  


 


Glucose   150 H  ()  mg/dL


 


Calcium   8.9  (8.5-10.1)  mg/dL


 


Troponin I  < 0.017   (0.000-0.060)  ng/mL











Meds: 


Medications











Generic Name Dose Route Start Last Admin





  Trade Name Jeronimoq  PRN Reason Stop Dose Admin


 


Acetaminophen  650 mg  18 09:56  





  Tylenol  PO   





  Q4H PRN   





  analgesia/fever   


 


Sodium Chloride  1,000 mls @ 250 mls/hr  18 10:15  18 10:54





  Normal Saline  IV   250 mls/hr





  ASDIRECTED YADIRA   Administration


 


Ketorolac Tromethamine  30 mg  18 10:00  18 10:30





  Toradol  IM  18 10:00  30 mg





  Q6H YADIRA   Administration


 


Nitroglycerin  0.4 mg  18 09:08  





  Nitrostat  SL  18 09:09  





  Q5M PRN   





  Chest Pain   


 


Sodium Chloride  10 ml  18 08:48  18 09:15





  Saline Flush  FLUSH   10 ml





  ASDIRECTED PRN   Administration





  Keep Vein Open   














Discontinued Medications














Generic Name Dose Route Start Last Admin





  Trade Name Jeronimoq  PRN Reason Stop Dose Admin


 


Aspirin  324 mg  18 09:08  18 09:09





  Aspirin  PO  18 09:09  324 mg





  ONETIME ONE   Administration


 


Clonazepam  1 mg  18 12:05  18 12:15





  Klonopin  PO  18 12:06  1 mg





  BID ONE   Administration


 


Clonazepam  Confirm  18 12:05  18 12:15





  Klonopin  Administered  18 12:06  Not Given





  Dose   





  1 mg   





  .ROUTE   





  .STK-MED ONE   


 


Ketorolac Tromethamine  Confirm  18 10:32  18 10:35





  Toradol  Administered  18 10:33  Not Given





  Dose   





  30 mg   





  .ROUTE   





  .STK-MED ONE   


 


Ondansetron HCl  Confirm  18 13:10  18 13:54





  Zofran Odt  Administered  18 13:11  Not Given





  Dose   





  4 mg   





  .ROUTE   





  .STK-MED ONE   


 


Ondansetron HCl  4 mg  18 13:17  18 13:21





  Zofran Odt  PO  18 13:18  4 mg





  ONETIME ONE   Administration














- Re-Assessments/Exams


Free Text/Narrative Re-Assessment/Exam: 





18 08:57  Pt complains of aching all over with this shortness of breath.  

EKG done.  Slight changes with anterior leads.  Troponins checked and negative.

  ASA given.  Will place on observation status.





18 10:55  Pt placed on observation.  Will start Nacl @ 250cc/hr and 

recheck troponin in 4 hour.  Pt relates that she had a recent appointment with 

her psychiatrist and Trazodone was increased to 100 mg PO at hs.  States her 

Ambien wasn't enough.  States she was on Trazadone 50 mg daily/hs and quit 

taking them and then was placed on the 100 mg dose.  Recommend decrease dose to 

Trazadone 50 mg PO daily/hs and see if this helps with her muscle aches.  Pt 

states she doesn't see the psychiatrist for 3 months now.  Pt states Toradol 

earlier in ER did help with her pain.  States she does have some Rx ready at 

the drug store.  Her  will go pick them up.  Will repeat the Toradol 

this am and recheck troponin around 1pm.  Pt to be discharged at that time if 

troponins are negative.





18 14:59  Pt states she hasn't had her Clonazapam for a couple days.  

Clonazapam 1 mg PO given and pt was able to sleep for about 1 hour.  Pt Rx for 

more Clonazapam isn't due to be refilled for another 12 days.  Discussed this 

with pt and significant other.  Recommend pt to follow-up with psychiatrist for 

her medications related to mental health.  Pt states she will contact her 

provider for this.  Pt to be discharged today after acute anxiety episode.  She 

did have her medications filled from her previous ER visits.  Pt states she 

needs to go home to take care of her grand-daughter.   Pt did have some lunch 

and zofran for nausea.  States she is feeling fine after she vomitted today.  

Discharge to care of significant other.














Departure





- Departure


Time of Disposition: 15:07


Disposition: Home, Self-Care 01


Condition: Good


Clinical Impression: 


 Anxiety








- Discharge Information





- My Orders


Last 24 Hours: 


My Active Orders





18 08:47


EKG Documentation Completion [RC] ASDIRECTED 





18 08:48


Sodium Chloride 0.9% [Saline Flush]   10 ml FLUSH ASDIRECTED PRN 


Saline Lock Insert [OM.PC] Routine 





18 09:08


Nitroglycerin [Nitrostat]   0.4 mg SL Q5M PRN 





18 09:09


Cardiac Monitoring [RC] .As Directed 





18 09:10


Oxygen Therapy [RC] PRN 














- Assessment/Plan


Last 24 Hours: 


My Active Orders





18 08:47


EKG Documentation Completion [RC] ASDIRECTED 





18 08:48


Sodium Chloride 0.9% [Saline Flush]   10 ml FLUSH ASDIRECTED PRN 


Saline Lock Insert [OM.PC] Routine 





18 09:08


Nitroglycerin [Nitrostat]   0.4 mg SL Q5M PRN 





18 09:09


Cardiac Monitoring [RC] .As Directed 





18 09:10


Oxygen Therapy [RC] PRN

## 2018-03-08 NOTE — PCM.DCSUM1
**Discharge Summary





- Hospital Course


HPI Initial Comments: 


52 yr female presents to ER from Ambulance with feeling some shortness of 

breath and pressure to mid-sternal area, restless.  ASA 324mg PO given EKG 

completed and labs.  Negative troponins and EKG NSR.  Pt nauseated and Zofran 

given with relief of symptoms.  Pt placed on observation with IV fluids to 

monitor troponins in 4 hours.  Significant other is w pt and states she has 

been having some yellow stools and thinks she may be sick with this.  Pt rested 

well, symptoms resolved and will discharge home to care of sig. other.  Pt 

reported needing a refill of her Clonazapam.  Medication refill was completed 

and pt should have medication for 12 more days.  Discussed this w pt that it 

was too soon to refill her medication.  She did receive Clonazapam 1 tab in 

hospital and symptoms resolved.  Recommend F/U with psychiatrist if needs 

change in medication for anxiety.  Recommend pt to take medications as 

prescribed and not to take more than Rx is for.  Pt states understanding.








- Discharge Data


Discharge Date: 03/06/18


Discharge Disposition: Home, Self-Care 01


Condition: Good





- Patient Instructions


Diet: Regular Diet as Tolerated


Activity: As Tolerated


Driving: Do Not Drive


Showering/Bathing: May Shower


Other/Special Instructions: RTC for routine health maintenance





- Discharge Plan


Home Medications: 


 Home Meds





Ipratropium/Albuterol Sulfate [Duoneb 0.5 MG-3 MG/3 ML] 1 - 2 puff INH Q4H PRN 

09/29/13 [History]


Omeprazole 20 mg PO ACBREAKFAST 09/29/13 [History]


risperiDONE [RisperiDAL M-Tab] 2 mg PO QPM 09/29/13 [History]


Acetaminophen [Tylenol Extra Strength] 500 mg PO Q6H 11/21/15 [History]


Budesonide/Formoterol [Symbicort 160-4.5 MCG] 2 puff INH BID 11/21/15 [History]


Gabapentin [Neurontin] 1 tab PO TID 11/21/15 [History]


QUEtiapine [SEROquel] 100 tab PO QAM 11/21/15 [History]


Spironolactone [Aldactone] 3 tab PO BID 11/21/15 [History]


buPROPion [Wellbutrin SR] 100 mg PO BID 11/21/15 [History]


hydrOXYzine HCl [hydrOXYzine] 1 - 3 cap PO TID 11/21/15 [History]


rOPINIRole [Requip] 1.5 tab PO BEDTIME 11/21/15 [History]


tiZANidine [Zanaflex] 4 mg PO Q6H 08/14/16 [History]


Zolpidem Tartrate [Ambien Cr] 12.5 tab PO QPM 01/16/17 [History]


ClonazePAM [KlonoPIN] 1 mg PO BID 12/10/17 [History]


QUEtiapine Fumarate [Quetiapine Fumarate] 400 mg PO QPM 12/10/17 [History]


traZODone HCl [Trazodone HCl] 100 mg PO QPM 12/10/17 [History]


sitaGLIPtin Phos/Metformin HCl [Janumet 50-1,000 MG] 1 tab PO BID 01/26/18 [

History]








Patient Handouts:  Shortness of Breath, Adult, Easy-to-Read


Forms:  ED Department Discharge


Referrals: 


Song Portillo MD [Primary Care Provider] - 





- General Info


Date of Service: 03/06/18


Admission Dx/Problem (Free Text: 


shortness of breath and anxiety





Functional Status: Reports: Pain Controlled, Tolerating Diet, Ambulating





- Review of Systems


General: Reports: No Symptoms.  Denies: Fever, Weakness


HEENT: Reports: No Symptoms


Pulmonary: Reports: No Symptoms.  Denies: Shortness of Breath, Pleuritic Chest 

Pain


Cardiovascular: Reports: No Symptoms.  Denies: Chest Pain, Palpitations


Gastrointestinal: Reports: No Symptoms.  Denies: Abdominal Pain, Constipation


Musculoskeletal: Reports: No Symptoms.  Denies: Neck Pain, Back Pain


Skin: Reports: No Symptoms


Neurological: Reports: No Symptoms.  Denies: Confusion, Headache


Psychiatric: Reports: Anxiety (improved).  Denies: Confusion





- Patient Data


Vitals - Most Recent: 


 Last Vital Signs











Temp  98.4 F   03/06/18 13:56


 


Pulse  89   03/06/18 13:56


 


Resp  18   03/06/18 13:56


 


BP  139/78   03/06/18 13:56


 


Pulse Ox  96   03/06/18 13:56











Weight - Most Recent: 242 lb


Med Orders - Current: 


 Current Medications








Discontinued Medications





Acetaminophen (Tylenol)  650 mg PO Q4H PRN


   PRN Reason: analgesia/fever


Aspirin (Aspirin)  324 mg PO ONETIME ONE


   Stop: 03/06/18 09:09


   Last Admin: 03/06/18 09:09 Dose:  324 mg


Clonazepam (Klonopin)  1 mg PO BID ONE


   Stop: 03/06/18 12:06


   Last Admin: 03/06/18 12:15 Dose:  1 mg


Clonazepam (Klonopin) Confirm Administered Dose 1 mg .ROUTE .STK-MED ONE


   Stop: 03/06/18 12:06


   Last Admin: 03/06/18 12:15 Dose:  Not Given


Sodium Chloride (Normal Saline)  1,000 mls @ 250 mls/hr IV ASDIRECTED YADIRA


   Last Admin: 03/06/18 10:54 Dose:  250 mls/hr


Ketorolac Tromethamine (Toradol)  30 mg IM Q6H [facility]


   Stop: 03/11/18 10:00


   Last Admin: 03/06/18 10:30 Dose:  30 mg


Ketorolac Tromethamine (Toradol) Confirm Administered Dose 30 mg .ROUTE .STK-

MED ONE


   Stop: 03/06/18 10:33


   Last Admin: 03/06/18 10:35 Dose:  Not Given


Nitroglycerin (Nitrostat)  0.4 mg SL Q5M PRN


   PRN Reason: Chest Pain


   Stop: 03/07/18 09:09


Ondansetron HCl (Zofran Odt) Confirm Administered Dose 4 mg .ROUTE .STK-MED ONE


   Stop: 03/06/18 13:11


   Last Admin: 03/06/18 13:54 Dose:  Not Given


Ondansetron HCl (Zofran Odt)  4 mg PO ONETIME ONE


   Stop: 03/06/18 13:18


   Last Admin: 03/06/18 13:21 Dose:  4 mg


Sodium Chloride (Saline Flush)  10 ml FLUSH ASDIRECTED PRN


   PRN Reason: Keep Vein Open


   Last Admin: 03/06/18 09:15 Dose:  10 ml











- Exam


Quality Assessment: Denies: Supplemental Oxygen, Skin Breakdown


General: Reports: Alert, Oriented, Cooperative, No Acute Distress


HEENT: Reports: Mucous Membr. Moist/Pink


Neck: Reports: Supple, Trachea Midline


Lungs: Reports: Clear to Auscultation, Normal Respiratory Effort


Cardiovascular: Reports: Regular Rate, Regular Rhythm


GI/Abdominal Exam: Soft, Non-Tender.  No: Distended, Guarding


Back Exam: Reports: Normal Inspection, Full Range of Motion


Extremities: Normal Inspection, Normal Range of Motion, No Pedal Edema, Normal 

Capillary Refill


Skin: Reports: Warm, Dry


Neurological: Reports: No New Focal Deficit


Psy/Mental Status: Reports: Alert, Normal Affect, Normal Mood





*Q Meaningful Use (DIS)





- VTE *Q


VTE Criteria *Q: 








- Stroke *Q


Stroke Criteria *Q: 








- AMI *Q


AMI Criteria *Q:

## 2018-08-15 ENCOUNTER — HOSPITAL ENCOUNTER (EMERGENCY)
Dept: HOSPITAL 60 - LB.ED | Age: 53
Discharge: HOME | End: 2018-08-15
Payer: MEDICARE

## 2018-08-15 DIAGNOSIS — E66.9: ICD-10-CM

## 2018-08-15 DIAGNOSIS — I10: ICD-10-CM

## 2018-08-15 DIAGNOSIS — K58.9: Primary | ICD-10-CM

## 2018-08-15 DIAGNOSIS — E11.9: ICD-10-CM

## 2018-08-15 DIAGNOSIS — Z91.09: ICD-10-CM

## 2018-08-15 DIAGNOSIS — Z88.8: ICD-10-CM

## 2018-08-15 DIAGNOSIS — Z79.899: ICD-10-CM

## 2018-08-15 PROCEDURE — 99283 EMERGENCY DEPT VISIT LOW MDM: CPT

## 2018-08-16 ENCOUNTER — HOSPITAL ENCOUNTER (INPATIENT)
Dept: HOSPITAL 60 - LB.CLINIC | Age: 53
LOS: 2 days | Discharge: HOME | DRG: 390 | End: 2018-08-18
Attending: NURSE PRACTITIONER | Admitting: NURSE PRACTITIONER
Payer: MEDICARE

## 2018-08-16 DIAGNOSIS — Z88.8: ICD-10-CM

## 2018-08-16 DIAGNOSIS — F17.210: ICD-10-CM

## 2018-08-16 DIAGNOSIS — J45.20: ICD-10-CM

## 2018-08-16 DIAGNOSIS — F32.9: ICD-10-CM

## 2018-08-16 DIAGNOSIS — M54.9: ICD-10-CM

## 2018-08-16 DIAGNOSIS — G25.81: ICD-10-CM

## 2018-08-16 DIAGNOSIS — E66.9: ICD-10-CM

## 2018-08-16 DIAGNOSIS — E11.9: ICD-10-CM

## 2018-08-16 DIAGNOSIS — K56.609: Primary | ICD-10-CM

## 2018-08-16 DIAGNOSIS — H54.7: ICD-10-CM

## 2018-08-16 DIAGNOSIS — Z79.899: ICD-10-CM

## 2018-08-16 DIAGNOSIS — K21.9: ICD-10-CM

## 2018-08-16 DIAGNOSIS — I10: ICD-10-CM

## 2018-08-16 DIAGNOSIS — Z87.01: ICD-10-CM

## 2018-08-16 DIAGNOSIS — G89.29: ICD-10-CM

## 2018-08-16 RX ADMIN — KETOROLAC TROMETHAMINE PRN MG: 30 INJECTION, SOLUTION INTRAMUSCULAR at 18:33

## 2018-08-16 NOTE — EDM.PDOC
ED HPI GENERAL MEDICAL PROBLEM





- General


Chief Complaint: General


Stated Complaint: sick, bloated


Time Seen by Provider: 08/15/18 18:45


Source of Information: Reports: Patient


History Limitations: Reports: No Limitations





- History of Present Illness


INITIAL COMMENTS - FREE TEXT/NARRATIVE: 


According to patient she has been having abdominal bloating and nausea on and 

off for past 2 months. She did have semiformed stool this morning. No fever or 

chills. No weight loss. Pt claims she gets constipation at times and some time 

bloating, with abdominal cramping. Today it has got worse. No hematuria, or 

dark stools. No other complaints.





Onset: Gradual


Duration: Week(s): (8)


Location: Reports: Abdomen


Quality: Reports: Dull


Severity: Moderate


Improves with: Reports: None


Worsens with: Reports: None


Associated Symptoms: Reports: Nausea/Vomiting.  Denies: Confusion, Chest Pain, 

Cough, Diaphoresis, Fever/Chills, Headaches, Loss of Appetite, Malaise, Rash, 

Seizure, Shortness of Breath, Syncope, Weakness





- Related Data


 Allergies











Allergy/AdvReac Type Severity Reaction Status Date / Time


 


adhesive tape Allergy  Rash Verified 08/15/18 18:38


 


celecoxib [From Celebrex] Allergy  Nausea and Verified 08/15/18 18:38





   Vomiting  











Home Meds: 


 Home Meds





Ipratropium/Albuterol Sulfate [Duoneb 0.5 MG-3 MG/3 ML] 1 - 2 puff INH Q4H PRN 

13 [History]


Omeprazole 20 mg PO ACBREAKFAST 13 [History]


risperiDONE [RisperiDAL M-Tab] 2 mg PO QPM 13 [History]


Acetaminophen [Tylenol Extra Strength] 500 mg PO Q6H 11/21/15 [History]


Budesonide/Formoterol [Symbicort 160-4.5 MCG] 2 puff INH BID 11/21/15 [History]


Gabapentin [Neurontin] 1 tab PO TID 11/21/15 [History]


QUEtiapine [SEROquel] 100 tab PO QAM 11/21/15 [History]


Spironolactone [Aldactone] 3 tab PO BID 11/21/15 [History]


buPROPion [Wellbutrin SR] 100 mg PO BID 11/21/15 [History]


hydrOXYzine HCl [hydrOXYzine] 1 - 3 cap PO TID 11/21/15 [History]


rOPINIRole [Requip] 1.5 tab PO BEDTIME 11/21/15 [History]


tiZANidine [Zanaflex] 4 mg PO Q6H 16 [History]


Zolpidem Tartrate [Ambien Cr] 12.5 tab PO QPM 17 [History]


ClonazePAM [KlonoPIN] 1 mg PO BID 12/10/17 [History]


QUEtiapine Fumarate [Quetiapine Fumarate] 400 mg PO QPM 12/10/17 [History]


sitaGLIPtin Phos/Metformin HCl [Janumet 50-1,000 MG] 1 tab PO BID 18 [

History]











Past Medical History


HEENT History: Reports: Impaired Vision, Other (See Below)


Other HEENT History: glasses for reading,


Cardiovascular History: Reports: Hypertension


Respiratory History: Reports: Asthma, Bronchitis, Recurrent, Pneumonia, 

Recurrent, Other (See Below)


Other Respiratory History: Emphysema


Gastrointestinal History: Reports: Bowel Obstruction, GERD, Irritable Bowel 

Syndrome


Genitourinary History: Reports: UTI, Recurrent


OB/GYN History: Reports: Dysfunctional Uterine Bleeding, Pregnancy


Musculoskeletal History: Reports: Back Pain, Chronic, Other (See Below)


Other Musculoskeletal History: back / butt pain, shoots down left leg


Neurological History: Reports: Migraines


Psychiatric History: Reports: Anxiety, Depression, Suicide Attempt, Suicidal 

Ideation


Endocrine/Metabolic History: Reports: Diabetes, Type II, Obesity/BMI 30+


Hematologic History: Reports: Blood Transfusion(s), Iron Deficiency


Dermatologic History: Reports: Eczema, Other (See Below)


Other Dermatologic History: Allergy to paper tape





- Infectious Disease History


Infectious Disease History: Reports: Chicken Pox, Measles, Rubella





- Past Surgical History


Female  Surgical History: Reports:  Section, Hysterectomy





Social & Family History





- Family History


Family Medical History: Noncontributory





- Caffeine Use


Caffeine Use: Reports: Coffee, Energy Drinks, Soda


Other Caffeine Use: 2 cups a day





ED ROS GENERAL





- Review of Systems


Review Of Systems: See Below


Constitutional: Denies: Fever, Chills


HEENT: Denies: Throat Pain, Throat Swelling


Respiratory: Denies: Cough, Sputum


Cardiovascular: Denies: Chest Pain, Lightheadedness


GI/Abdominal: Reports: Abdominal Pain, Constipation, Diarrhea, Distension, 

Nausea.  Denies: Black Stool, Bloody Stool, Difficulty Swallowing, Hematemesis, 

Hematochezia, Stool Incontinence, Vomiting


: Denies: Dysuria, Flank Pain, Frequency


Musculoskeletal: Denies: Joint Pain, Joint Swelling


Skin: Denies: Pruritis, Rash





ED EXAM, GENERAL





- Physical Exam


Exam: See Below


Exam Limited By: No Limitations


General Appearance: Alert, WD/WN, No Apparent Distress


Eye Exam: Bilateral Eye: EOMI, PERRL


Ears: Normal External Exam, Normal Canal, Hearing Grossly Normal, Normal TMs


Ear Exam: Bilateral Ear: Auricle Normal, Canal Normal, TM normal


Nose: Normal Inspection, Normal Mucosa, No Blood


Throat/Mouth: Normal Inspection, Normal Lips, Normal Teeth, Normal Gums, Normal 

Oropharynx, Normal Voice, No Airway Compromise


Head: Atraumatic, Normocephalic


Neck: Normal Inspection, Supple, Non-Tender, Full Range of Motion


Respiratory/Chest: No Respiratory Distress, Lungs Clear, Normal Breath Sounds, 

No Accessory Muscle Use, Chest Non-Tender


Cardiovascular: Normal Peripheral Pulses, Regular Rate, Rhythm, No Edema, No 

Gallop, No JVD, No Murmur, No Rub


GI/Abdominal: Normal Bowel Sounds, Soft, Non-Tender, No Organomegaly, No 

Abnormal Bruit, No Mass, Distended


Extremities: Normal Inspection, Normal Range of Motion, Non-Tender, Normal 

Capillary Refill, No Pedal Edema


Neurological: Alert, Oriented, CN II-XII Intact, Normal Cognition, Normal Gait, 

Normal Reflexes, No Motor/Sensory Deficits





Course





- Vital Signs


Text/Narrative:: 


Pt as  very nonspecific abdominal distension and nausea for 8 wks now. She has 

had workup done by her PCP and had no abnormalities. Her symptoms have got 

worse in the past 2 days. Clinical exam is normal other than distension. Pt 

probably has constipation or IBS variant. She did receive Bentyl 10mg BID. 

Advised soft diet, and good bowel hygiene. Advised to followup with her Primary 

care provider for further workup.





Return to emergency room if not better.








- Orders/Labs/Meds


Meds: 





Medications














Discontinued Medications














Generic Name Dose Route Start Last Admin





  Trade Name Freq  PRN Reason Stop Dose Admin


 


Dicyclomine HCl  Confirm  08/15/18 18:52  





  Bentyl  Administered  08/15/18 18:53  





  Dose   





  30 mg   





  .ROUTE   





  .STK-MED ONE   





     





     





     





     


 


Dicyclomine HCl  20 mg  08/15/18 18:30  





  Bentyl  .ROUTE  08/15/18 18:31  





  .STK-MED ONE   





     





     





     





     














Departure





- Departure


Time of Disposition: 19:30


Disposition: Home, Self-Care 01


Condition: Good


Clinical Impression: 


 IBS (irritable bowel syndrome)








- Discharge Information


Referrals: 


PCP,None [Primary Care Provider] - 


Forms:  ED Department Discharge





- Problem List & Annotations


(1) IBS (irritable bowel syndrome)


SNOMED Code(s): 50226106


   Code(s): K58.9 - IRRITABLE BOWEL SYNDROME WITHOUT DIARRHEA   Status: Acute   





- Problem List Review


Problem List Initiated/Reviewed/Updated: Yes





- Assessment/Plan


Assessment:: 


IBS





Plan: 


Pt as  very nonspecific abdominal distension and nausea for 8 wks now. She has 

had workup done by her PCP and had no abnormalities. Her symptoms have got 

worse in the past 2 days. Clinical exam is normal other than distension. Pt 

probably has constipation or IBS variant. She did receive Bentyl 10mg BID. 

Advised soft diet, and good bowel hygiene. Advised to followup with her Primary 

care provider for further workup.





Return to emergency room if not better.

## 2018-08-17 RX ADMIN — KETOROLAC TROMETHAMINE PRN MG: 30 INJECTION, SOLUTION INTRAMUSCULAR at 11:22

## 2018-08-17 RX ADMIN — ALOGLIPTIN SCH MG: 25 TABLET, FILM COATED ORAL at 21:22

## 2018-08-17 RX ADMIN — KETOROLAC TROMETHAMINE PRN MG: 30 INJECTION, SOLUTION INTRAMUSCULAR at 02:29

## 2018-08-17 RX ADMIN — ALOGLIPTIN SCH MG: 25 TABLET, FILM COATED ORAL at 16:21

## 2018-08-17 RX ADMIN — MOMETASONE FUROATE AND FORMOTEROL FUMARATE DIHYDRATE SCH: 200; 5 AEROSOL RESPIRATORY (INHALATION) at 00:04

## 2018-08-17 RX ADMIN — KETOROLAC TROMETHAMINE PRN MG: 30 INJECTION, SOLUTION INTRAMUSCULAR at 21:34

## 2018-08-17 RX ADMIN — ALOGLIPTIN SCH TAB: 25 TABLET, FILM COATED ORAL at 18:37

## 2018-08-17 RX ADMIN — MOMETASONE FUROATE AND FORMOTEROL FUMARATE DIHYDRATE SCH: 200; 5 AEROSOL RESPIRATORY (INHALATION) at 11:23

## 2018-08-17 RX ADMIN — OMEPRAZOLE SCH MG: 20 CAPSULE, DELAYED RELEASE ORAL at 07:37

## 2018-08-17 RX ADMIN — MOMETASONE FUROATE AND FORMOTEROL FUMARATE DIHYDRATE SCH: 200; 5 AEROSOL RESPIRATORY (INHALATION) at 21:20

## 2018-08-17 NOTE — PCM.HP
H&P History of Present Illness





- General


Date of Service: 18


Admit Problem/Dx: 


 Admission Diagnosis/Problem





Admission Diagnosis/Problem      Bowel obstruction due to retraction of stoma








Source of Information: Patient, Old Records


History Limitations: Reports: No Limitations





- History of Present Illness


Initial Comments - Free Text/Narative: 


53 yr female presented to clinic 2018.  Abdominal pain and firm abdomen 

with distant bowel sounds.  CT completed and small bowel obstruction noted, no 

free air or fluid.  Will admit with small bowel obstruction.  Will place NPO 

with sips of water with medications and small amount of ice chips to assist 

with mouth moisturizer.  Kindly refer to notes from clinic visit for her H&P.








- Related Data


Allergies/Adverse Reactions: 


 Allergies











Allergy/AdvReac Type Severity Reaction Status Date / Time


 


adhesive tape Allergy  Rash Verified 18 16:47


 


celecoxib [From Celebrex] Allergy  Nausea and Verified 18 16:47





   Vomiting  











Home Medications: 


 Home Meds





Ipratropium/Albuterol Sulfate [Duoneb 0.5 MG-3 MG/3 ML] 1 - 2 puff INH Q4H PRN 

13 [History]


Omeprazole 20 mg PO ACBREAKFAST 13 [History]


risperiDONE [RisperiDAL M-Tab] 2 mg PO QPM 13 [History]


Acetaminophen [Tylenol Extra Strength] 500 mg PO Q6H 11/21/15 [History]


Budesonide/Formoterol [Symbicort 160-4.5 MCG] 2 puff INH BID 11/21/15 [History]


Gabapentin [Neurontin] 1 tab PO TID 11/21/15 [History]


QUEtiapine [SEROquel] 100 tab PO QAM 11/21/15 [History]


Spironolactone [Aldactone] 3 tab PO BID 11/21/15 [History]


buPROPion [Wellbutrin SR] 100 mg PO BID 11/21/15 [History]


hydrOXYzine HCl [hydrOXYzine] 1 - 3 cap PO TID 11/21/15 [History]


rOPINIRole [Requip] 1.5 tab PO BEDTIME 11/21/15 [History]


tiZANidine [Zanaflex] 4 mg PO Q6H 16 [History]


Zolpidem Tartrate [Ambien Cr] 12.5 tab PO QPM 17 [History]


ClonazePAM [KlonoPIN] 1 mg PO BID 12/10/17 [History]


QUEtiapine Fumarate [Quetiapine Fumarate] 400 mg PO QPM 12/10/17 [History]


sitaGLIPtin Phos/Metformin HCl [Janumet 50-1,000 MG] 1 tab PO BID 18 [

History]











Past Medical History


HEENT History: Reports: Impaired Vision, Other (See Below)


Other HEENT History: glasses for reading,


Cardiovascular History: Reports: Hypertension


Respiratory History: Reports: Asthma, Bronchitis, Recurrent, Pneumonia, 

Recurrent, Other (See Below)


Other Respiratory History: Emphysema


Gastrointestinal History: Reports: Bowel Obstruction, GERD, Irritable Bowel 

Syndrome


Genitourinary History: Reports: UTI, Recurrent


OB/GYN History: Reports: Dysfunctional Uterine Bleeding, Pregnancy


Musculoskeletal History: Reports: Back Pain, Chronic, Other (See Below)


Other Musculoskeletal History: back / butt pain, shoots down left leg


Neurological History: Reports: Migraines


Psychiatric History: Reports: Anxiety, Depression, Suicide Attempt, Suicidal 

Ideation


Endocrine/Metabolic History: Reports: Diabetes, Type II, Obesity/BMI 30+


Hematologic History: Reports: Blood Transfusion(s), Iron Deficiency


Dermatologic History: Reports: Eczema, Other (See Below)


Other Dermatologic History: Allergy to paper tape





- Infectious Disease History


Infectious Disease History: Reports: Chicken Pox, Measles, Rubella





- Past Surgical History


Female  Surgical History: Reports:  Section, Hysterectomy





Social & Family History





- Family History


Family Medical History: Noncontributory


Cardiac: Reports: None


Musculoskeletal: Reports: Arthritis, Back pain, Chronic


Neurological: Reports: None


Psychiatric: Reports: Depression


Endocrine/Metabolic: Reports: None


Oncologic: Reports: None





- Tobacco Use


Smoking Status *Q: Current Every Day Smoker


Years of Tobacco use: 30


Packs/Tins Daily: 0.5


Used Tobacco, but Quit: No


Second Hand Smoke Exposure: Yes





- Caffeine Use


Caffeine Use: Reports: Soda


Other Caffeine Use: 2 cups a day





- Recreational Drug Use


Recreational Drug Use: No





H&P Review of Systems





- Review of Systems:


Review Of Systems: See Below


General: Reports: Fatigue, Decreased Appetite


HEENT: Reports: No Symptoms


Pulmonary: Reports: No Symptoms


Cardiovascular: Reports: No Symptoms


Gastrointestinal: Reports: Abdominal Pain, Decreased Appetite, Distension.  

Denies: Flatus


Genitourinary: Reports: No Symptoms


Musculoskeletal: Reports: No Symptoms


Skin: Reports: No Symptoms


Psychiatric: Reports: Other (Just had telemedicine visit with psychiatry and 

changed from Trazodone to Ambien for sleep)


Neurological: Reports: No Symptoms


Hematologic/Lymphatic: Reports: No Symptoms


Immunologic: Reports: No Symptoms





Exam





- Exam


Exam: See Below





- Vital Signs


Vital Signs: 


 Last Vital Signs











Temp  97.8 F   18 00:00


 


Pulse  67   18 00:00


 


Resp  20   18 00:00


 


BP  115/58 L  18 00:00


 


Pulse Ox  96   18 00:00











Weight: 245 lb 3.2 oz





- Exam


Quality Assessment: No: Supplemental Oxygen, DVT Prophylaxis


General: Alert, Oriented, Cooperative


HEENT: PERRLA, Mucosa Moist & Pink


Neck: Supple, Trachea Midline


Lungs: Clear to Auscultation, Normal Respiratory Effort


Cardiovascular: Regular Rate, Regular Rhythm


GI/Abdominal Exam: Distended, Tender, Other (distant bowel sounds X 4 quadrant)

.  No: Guarding


Back Exam: Normal Inspection, Full Range of Motion


Extremities: Normal Inspection, Normal Range of Motion, Non-Tender


Peripheral Pulses: 2+: Dorsalis Pedis (L), Dorsalis Pedis (R)


Skin: Warm, Dry


Neuro Extensive - Mental Status: Alert, Oriented x3, Normal Mood/Affect, Normal 

Cognition


Neuro Extensive - Motor, Sensory, Reflexes: Normal Gait, Normal Reflexes


Psychiatric: Alert, Anxious, Depressed.  No: Suicidal Ideation





- Patient Data


Lab Results Last 24 hrs: 


 Laboratory Results - last 24 hr











  18 Range/Units





  17:05 17:05 


 


WBC  7.4   (4.0-11.0)  K/uL


 


RBC  4.39   (3.80-5.80)  M/uL


 


Hgb  11.4 L   (11.5-16.5)  g/dL


 


Hct  34.1 L   (37.0-47.0)  %


 


MCV  78   (76-96)  fL


 


MCH  26.0 L   (27.0-32.0)  pg


 


MCHC  33.4   (31.0-35.0)  g/dL


 


RDW  13.6   (11.0-16.0)  %


 


Plt Count  157   (150-500)  K/uL


 


MPV  11.3 H   (6.0-10.0)  fL


 


Neut % (Auto)  45.4   (45.0-70.0)  %


 


Lymph % (Auto)  41.2 H   (20.0-40.0)  %


 


Mono % (Auto)  10.8 H   (3.0-10.0)  %


 


Eos % (Auto)  2.2   (1.0-5.0)  %


 


Baso % (Auto)  0.4   (0.0-0.5)  %


 


Neut # (Auto)  3.38   (2.00-7.50)  K/uL


 


Lymph # (Auto)  3.06   (1.50-4.00)  K/uL


 


Mono # (Auto)  0.80   (0.20-0.80)  K/uL


 


Eos # (Auto)  0.16   (0.04-0.40)  K/uL


 


Baso # (Auto)  0.03   (0.02-0.10)  K/uL


 


Sodium   136  (136-145)  mmol/L


 


Potassium   3.5  (3.5-5.1)  mmol/L


 


Chloride   103  ()  mmol/L


 


Carbon Dioxide   23.3  (21.0-32.0)  mmol/L


 


Anion Gap   13.2  (5.0-15.0)  mmol/L


 


BUN   7 L D  (8-26)  mg/dL


 


Creatinine   0.95  (0.55-1.02)  mg/dL


 


Est Cr Clr Drug Dosing   64.11  mL/min


 


Estimated GFR (MDRD)   > 60  (>60)  MLS/MIN


 


BUN/Creatinine Ratio   7.4  (6-25)  


 


Glucose   191 H  ()  mg/dL


 


Calcium   8.7  (8.5-10.1)  mg/dL


 


Total Bilirubin   0.2  D  (0.0-1.0)  mg/dL


 


AST   17  (15-37)  U/L


 


ALT   30  (12-78)  U/L


 


Alkaline Phosphatase   125 H  ()  U/L


 


Total Protein   6.4  (6.4-8.2)  g/dL


 


Albumin   3.1 L  (3.4-5.0)  g/dL


 


Globulin   3.3  (2.2-4.2)  g/dL


 


Albumin/Globulin Ratio   0.9  (0.8-2.0)  











Result Diagrams: 


 18 17:05





 18 17:05





- Problem List


(1) Chronic pain


SNOMED Code(s): 69748667


   ICD Code: G89.29 - OTHER CHRONIC PAIN   Status: Acute   Current Visit: No   





(2) Asthma


SNOMED Code(s): 242150790


   ICD Code: J45.909 - UNSPECIFIED ASTHMA, UNCOMPLICATED   Status: Chronic   

Current Visit: No   


Qualifiers: 


   Asthma severity: mild   Asthma persistence: intermittent   Asthma 

complication type: uncomplicated   Qualified Code(s): J45.20 - Mild 

intermittent asthma, uncomplicated   





(3) Depression


SNOMED Code(s): 21605816


   ICD Code: F32.9 - MAJOR DEPRESSIVE DISORDER, SINGLE EPISODE, UNSPECIFIED   

Status: Chronic   Current Visit: No   





(4) GERD (gastroesophageal reflux disease)


SNOMED Code(s): 333248304


   ICD Code: K21.9 - GASTRO-ESOPHAGEAL REFLUX DISEASE WITHOUT ESOPHAGITIS   

Status: Chronic   Current Visit: No   





(5) HTN (hypertension)


SNOMED Code(s): 05487392


   ICD Code: I10 - ESSENTIAL (PRIMARY) HYPERTENSION   Status: Chronic   Current 

Visit: No   


Qualifiers: 


   Hypertension type: essential hypertension   Qualified Code(s): I10 - 

Essential (primary) hypertension   





(6) Restless leg


SNOMED Code(s): 39375545


   ICD Code: G25.81 - RESTLESS LEGS SYNDROME   Status: Chronic   Current Visit: 

No   





(7) Small bowel obstruction


SNOMED Code(s): 873515576


   ICD Code: K56.609 - UNSP INTESTNL OBST, UNSP AS TO PARTIAL VERSUS COMPLETE 

OBST   Status: Resolved   Priority: High   Current Visit: No   


Problem List Initiated/Reviewed/Updated: Yes


Orders Last 24hrs: 


 Active Orders 24 hr











 Category Date Time Status


 


 Patient Status [ADT] Routine ADT  18 16:42 Active


 


 Blood Glucose Check, Bedside [RC] BIDAC Care  18 17:35 Ordered


 


 Intake and Output [RC] QSHIFT Care  18 16:44 Active


 


 Oxygen Therapy [RC] PRN Care  18 16:42 Active


 


 Up ad Nae [RC] ASDIRECTED Care  18 16:42 Active


 


 Vital Signs [RC] Q4H Care  18 16:42 Active


 


 Nothing per Oral Now Diet [DIET] Diet  18 Dinner Ordered


 


 BASIC METABOLIC PANEL,BMP [CHEM] Routine Lab  18 08:00 Ordered


 


 CBC WITH AUTO DIFF [HEME] Routine Lab  18 08:00 Ordered


 


 LACTIC ACID [CHEM] Routine Lab  18 08:00 Ordered


 


 Acetaminophen [Tylenol Extra Strength] Med  18 17:00 Active





 500 mg PO Q6H   


 


 Albuterol/Ipratropium [DuoNeb 3.0-0.5 MG/3 ML] Med  18 16:48 Active





 3 ml INH Q4H PRN   


 


 ClonazePAM [KlonoPIN] Med  18 20:00 Active





 1 mg PO BID   


 


 Gabapentin [Neurontin] Med  18 20:00 Active





 800 mg PO TID   


 


 Ketorolac [Toradol] Med  18 18:27 Active





 15 mg IVPUSH Q8H PRN   


 


 Mometasone/Formoterol [Dulera 200-5 MCG] Med  18 20:00 Active





 2 puff IH BID   


 


 Nicotine [Habitrol] Med  18 08:00 Active





 21 mg TRDERM DAILY   


 


 Omeprazole Med  18 07:00 Active





 20 mg PO ACBREAKFAST   


 


 QUEtiapine [SEROquel] Med  18 08:00 Active





 100 mg PO QAM   


 


 QUEtiapine [SEROquel] Med  18 20:00 Active





 400 mg PO QPM   


 


 Sodium Chloride 0.9% [Normal Saline] 1,000 ml Med  18 00:45 Active





 IV ASDIRECTED   


 


 Spironolactone [Aldactone] Med  18 20:00 Active





 75 mg PO BID   


 


 Zolpidem [Ambien] Med  18 20:00 Active





 10 mg PO BEDTIME   


 


 buPROPion [Wellbutrin SR] Med  18 14:30 Active





 100 mg PO BID   


 


 hydrOXYzine HCl [Atarax] Med  18 20:00 Active





 50 mg PO TID   


 


 rOPINIRole [Requip] Med  18 20:00 Active





 1.5 mg PO BEDTIME   


 


 risperiDONE [RisperiDAL] Med  18 20:00 Active





 2 mg PO QPM   


 


 sitaGLIPtin Phos/Metformin HCl [Janumet 50-1,000 MG] Med  18 17:00 Active





 1 tab PO BIDMEALS   


 


 traMADol [Ultram] Med  18 16:47 Active





 50 mg PO Q6H PRN   








 Medication Orders





Acetaminophen (Tylenol Extra Strength)  500 mg PO Q6H [facility]


   Last Admin: 18 11:21  Dose: 500 mg


   Admin: 18 06:05 Dose:  Not Given


   Admin: 18 00:00  Dose: 500 mg


   Admin: 18 17:15  Dose: 500 mg


Albuterol/Ipratropium (Duoneb 3.0-0.5 Mg/3 Ml)  3 ml INH Q4H PRN


   PRN Reason: Shortness of Breath


Clonazepam (Klonopin)  1 mg PO BID [facility]


   Last Admin: 18 08:47  Dose: 1 mg


   Admin: 18 21:22  Dose: 1 mg


Gabapentin (Neurontin)  800 mg PO TID [facility]


   Last Admin: 18 16:20  Dose: 800 mg


   Admin: 18 09:03  Dose: 800 mg


   Admin: 18 21:24  Dose: 800 mg


Hydroxyzine HCl (Atarax)  50 mg PO TID [facility]


   Last Admin: 18 16:21  Dose: 50 mg


   Admin: 18 08:45  Dose: 50 mg


   Admin: 18 21:23  Dose: 50 mg


Sodium Chloride (Normal Saline)  1,000 mls @ 75 mls/hr IV ASDIRECTED [facility]


   Last Admin: 18 16:32  Dose: 125 mls/hr


   Infusion: 18 16:32  Dose: 125 mls/hr


   Admin: 18 08:54  Dose: 125 mls/hr


   Infusion: 18 08:45  Dose: 125 mls/hr


   Admin: 18 00:45  Dose: 125 mls/hr


Ketorolac Tromethamine (Toradol)  15 mg IVPUSH Q8H PRN


   PRN Reason: Abdominal Pain


   Stop: 18 18:28


   Last Admin: 18 11:22  Dose: 15 mg


   Admin: 18 02:29  Dose: 15 mg


   Admin: 18 18:33  Dose: 15 mg


Mometasone Furoate/Formoterol Fumar (Dulera 200-5 Mcg)  2 puff IH BID [facility]


   Last Admin: 18 11:23  Dose:  


   Admin: 18 00:04 Dose:  Not Given


Nicotine (Habitrol)  21 mg TRDERM DAILY [facility]


   Last Admin: 18 08:46  Dose: 21 mg


Non-Formulary Medication ( Bupropion [ Wellbutrin Sr] 100 Mg)  100 mg PO BID [facility]


   Last Admin: 18 16:21  Dose: 100 mg


Patients Own ** ( Sitagliptin Phos/Metformin Hcl [ Janumet 50-1,000 Mg] 1 Ta  1 

tab PO BIDMEALS [facility]


Omeprazole (Omeprazole)  20 mg PO ACBREAKFAST [facility]


   Last Admin: 18 07:37  Dose: 20 mg


Quetiapine Fumarate (Seroquel)  400 mg PO QPM [facility]


   Last Admin: 18 21:24  Dose: 400 mg


Quetiapine Fumarate (Seroquel)  100 mg PO QAM [facility]


   Last Admin: 18 08:45  Dose: 100 mg


Risperidone (Risperidal)  2 mg PO QPM [facility]


   Last Admin: 18 21:24  Dose: 2 mg


Ropinirole HCl (Requip)  1.5 mg PO BEDTIME [facility]


   Last Admin: 18 21:25  Dose: 1.5 mg


Spironolactone (Aldactone)  75 mg PO BID [facility]


   Last Admin: 18 08:46  Dose: 75 mg


   Admin: 18 21:23  Dose: 75 mg


Tramadol HCl (Ultram)  50 mg PO Q6H PRN


   PRN Reason: Abdominal Pain


   Last Admin: 18 16:19  Dose: 50 mg


   Admin: 18 09:02  Dose: 50 mg


   Admin: 18 00:01  Dose: 50 mg


   Admin: 18 17:17  Dose: 50 mg


Zolpidem Tartrate (Ambien)  10 mg PO BEDTIME [facility]








Assessment/Plan Comment:: 


Admit this 53 yr female with small bowel obstruction:  Place on NPO except 

small amount of ice chips, sips of water with medications.  Control of 

abdominal pain with anti-inflammatories. Recommend up as tolerated to increase 

bowel function.  Recommend no narcotics for her pain and pt in agreement with 

this.  Will order nicotine patch as needed. IV fluid of N/S at 125cc/hr.  

Closely monitor and conservative treatment at this time.  Pt would prefer to 

not have N/G tube if possible.  She has had limited nausea with this.  Pt in 

agreement with this plan.

## 2018-08-17 NOTE — CT
UNENHANCED ABDOMEN AND PELVIC CT, 08/16/18



Multislice acquisition through the abdomen and pelvis without IV or oral 
contrast was performed.  



Comparison was made to a prior abdomen and pelvic CT dated 01/24/18.  



There are small pleural-based nodules within the left costophrenic angle.  
There are also small subpleural nodules in the right middle lobe and right 
lower lobe.  These are unchanged.  



There is diffuse heterogeneous fatty infiltration of the liver.  No focal 
hepatic lesions.  The patient is status post cholecystectomy.  



The spleen appears normal.  The pancreas appears normal.  The right and left 
adrenals appear normal.  



The right and left kidneys appear normal.  No nephrocalcinosis or 
nephrolithiasis.  No hydronephrosis or hydroureter.  



The bladder is fluid filled and appears normal.  



The patient is status post hysterectomy.  



The appendix is not seen.  No evidence of appendicitis.  



The stomach is fluid and gas filled and moderately distended.  There are 
multiple fluid filled, mildly distended loops of small bowel throughout the 
abdomen.  These contain air-fluid levels.  The distal ileum is not dilated.  
These findings are consistent with a localized ileus or small bowel 
obstruction.  



No free air.  No free fluid.  No adenopathy.  No aortic aneurysm.  



IMPRESSION: 

Findings consistent with small bowel obstruction or ileus.  Other findings as 
discussed above.  



[id number] 
[facility]

## 2018-08-17 NOTE — PCM.PN
- General Info


Date of Service: 08/17/18


Admission Dx/Problem (Free Text): 


 Admission Diagnosis/Problem





Admission Diagnosis/Problem      Bowel obstruction








Subjective Update: 


Pt states she is feeling some better and is passing flatus this am.





Functional Status: Reports: Pain Controlled, Ambulating





- Review of Systems


General: Reports: No Symptoms, Other (tired)


HEENT: Reports: No Symptoms


Pulmonary: Reports: No Symptoms


Cardiovascular: Reports: No Symptoms


Gastrointestinal: Reports: Abdominal Pain, Flatus.  Denies: Diarrhea, Nausea, 

Vomiting


Genitourinary: Reports: No Symptoms


Musculoskeletal: Reports: No Symptoms


Skin: Reports: No Symptoms


Neurological: Reports: No Symptoms


Psychiatric: Reports: Depression





- Patient Data


Vitals - Most Recent: 


 Last Vital Signs











Temp  97.8 F   08/17/18 00:00


 


Pulse  67   08/17/18 00:00


 


Resp  20   08/17/18 00:00


 


BP  115/58 L  08/17/18 00:00


 


Pulse Ox  96   08/17/18 00:00











Weight - Most Recent: 245 lb 3.2 oz


Med Orders - Current: 


 Current Medications





Acetaminophen (Tylenol Extra Strength)  500 mg PO Q6H [facility]


   Last Admin: 08/17/18 11:21 Dose:  500 mg


Albuterol/Ipratropium (Duoneb 3.0-0.5 Mg/3 Ml)  3 ml INH Q4H PRN


   PRN Reason: Shortness of Breath


Clonazepam (Klonopin)  1 mg PO BID [facility]


   Last Admin: 08/17/18 08:47 Dose:  1 mg


Gabapentin (Neurontin)  800 mg PO TID [facility]


   Last Admin: 08/17/18 16:20 Dose:  800 mg


Hydroxyzine HCl (Atarax)  50 mg PO TID [facility]


   Last Admin: 08/17/18 16:21 Dose:  50 mg


Sodium Chloride (Normal Saline)  1,000 mls @ 75 mls/hr IV ASDIRECTED [facility]


   Last Admin: 08/17/18 16:32 Dose:  125 mls/hr


Ketorolac Tromethamine (Toradol)  15 mg IVPUSH Q8H PRN


   PRN Reason: Abdominal Pain


   Stop: 08/21/18 18:28


   Last Admin: 08/17/18 11:22 Dose:  15 mg


Mometasone Furoate/Formoterol Fumar (Dulera 200-5 Mcg)  2 puff IH BID [facility]


   Last Admin: 08/17/18 11:23 Dose:  Not Given


Nicotine (Habitrol)  21 mg TRDERM DAILY [facility]


   Last Admin: 08/17/18 08:46 Dose:  21 mg


Non-Formulary Medication ( Bupropion [ Wellbutrin Sr] 100 Mg)  100 mg PO BID [facility]


   Last Admin: 08/17/18 16:21 Dose:  100 mg


Patients Own ** ( Sitagliptin Phos/Metformin Hcl [ Janumet 50-1,000 Mg] 1 Ta  1 

tab PO BIDMEALS [facility]


Omeprazole (Omeprazole)  20 mg PO ACBREAKFAST [facility]


   Last Admin: 08/17/18 07:37 Dose:  20 mg


Quetiapine Fumarate (Seroquel)  400 mg PO QPM [facility]


   Last Admin: 08/16/18 21:24 Dose:  400 mg


Quetiapine Fumarate (Seroquel)  100 mg PO QAM [facility]


   Last Admin: 08/17/18 08:45 Dose:  100 mg


Risperidone (Risperidal)  2 mg PO QPM [facility]


   Last Admin: 08/16/18 21:24 Dose:  2 mg


Ropinirole HCl (Requip)  1.5 mg PO BEDTIME [facility]


   Last Admin: 08/16/18 21:25 Dose:  1.5 mg


Spironolactone (Aldactone)  75 mg PO BID [facility]


   Last Admin: 08/17/18 08:46 Dose:  75 mg


Tramadol HCl (Ultram)  50 mg PO Q6H PRN


   PRN Reason: Abdominal Pain


   Last Admin: 08/17/18 16:19 Dose:  50 mg


Zolpidem Tartrate (Ambien)  10 mg PO BEDTIME [facility]





Discontinued Medications





Gabapentin (Neurontin) Confirm Administered Dose 800 mg .ROUTE .STK-MED ONE


   Stop: 08/16/18 21:10


   Last Admin: 08/16/18 21:24 Dose:  Not Given


Gabapentin (Neurontin) Confirm Administered Dose 800 mg .ROUTE .STK-MED ONE


   Stop: 08/17/18 09:00


   Last Admin: 08/17/18 11:23 Dose:  Not Given


Gabapentin (Neurontin) Confirm Administered Dose 800 mg .ROUTE .STK-MED ONE


   Stop: 08/17/18 16:13


   Last Admin: 08/17/18 17:46 Dose:  Not Given


Ketorolac Tromethamine (Toradol) Confirm Administered Dose 30 mg .ROUTE .STK-

MED ONE


   Stop: 08/16/18 18:32


   Last Admin: 08/16/18 21:00 Dose:  Not Given


Quetiapine Fumarate (Quetiapine Fumarate) Confirm Administered Dose 400 mg PO 

.STK-MED ONE


   Stop: 08/16/18 21:11


   Last Admin: 08/16/18 21:24 Dose:  Not Given


Zolpidem Tartrate (Ambien)  12.5 mg PO BEDTIME [facility]


   Last Admin: 08/17/18 00:48 Dose:  Not Given


Zolpidem Tartrate (Ambien) Confirm Administered Dose 15 mg .ROUTE .STK-MED ONE


   Stop: 08/16/18 21:17


   Last Admin: 08/16/18 21:36 Dose:  Not Given


Zolpidem Tartrate (Ambien)  10 mg PO ONETIME ONE


   Stop: 08/16/18 21:46


   Last Admin: 08/16/18 21:37 Dose:  10 mg











- Exam


Quality Assessment: No: Supplemental Oxygen, DVT Prophylaxis, Skin Breakdown


General: Alert, Oriented, Cooperative, No Acute Distress


HEENT: Mucous Membr. Moist/Pink


Neck: Supple, Trachea Midline.  No: Lymphadenopathy


Lungs: Clear to Auscultation, Normal Respiratory Effort.  No: Crackles, Rhonchi


Cardiovascular: Regular Rate, Regular Rhythm


GI/Abdominal Exam: Tender, Other (Bowel sounds improved and less distended.  

Abdomen more soft, but still full. ).  No: Guarding, Rigid


Back Exam: Normal Inspection


Extremities: Normal Inspection, Normal Range of Motion, No Pedal Edema


Skin: Warm, Dry


Neurological: No New Focal Deficit


Psy/Mental Status: Alert, Normal Affect, Depressed, Other (Teary eyed with 

talking of family and concerns of her children and grandchildren.)





- Problem List & Annotations


(1) Chronic pain


SNOMED Code(s): 95360670


   Code(s): G89.29 - OTHER CHRONIC PAIN   Status: Acute   Current Visit: No   





(2) Asthma


SNOMED Code(s): 690722542


   Code(s): J45.909 - UNSPECIFIED ASTHMA, UNCOMPLICATED   Status: Chronic   

Current Visit: No   


Qualifiers: 


   Asthma severity: mild   Asthma persistence: intermittent   Asthma 

complication type: uncomplicated   Qualified Code(s): J45.20 - Mild 

intermittent asthma, uncomplicated   





(3) Depression


SNOMED Code(s): 71133457


   Code(s): F32.9 - MAJOR DEPRESSIVE DISORDER, SINGLE EPISODE, UNSPECIFIED   

Status: Chronic   Current Visit: No   





(4) GERD (gastroesophageal reflux disease)


SNOMED Code(s): 561295091


   Code(s): K21.9 - GASTRO-ESOPHAGEAL REFLUX DISEASE WITHOUT ESOPHAGITIS   

Status: Chronic   Current Visit: No   





(5) HTN (hypertension)


SNOMED Code(s): 71663345


   Code(s): I10 - ESSENTIAL (PRIMARY) HYPERTENSION   Status: Chronic   Current 

Visit: No   


Qualifiers: 


   Hypertension type: essential hypertension   Qualified Code(s): I10 - 

Essential (primary) hypertension   





(6) Restless leg


SNOMED Code(s): 36189080


   Code(s): G25.81 - RESTLESS LEGS SYNDROME   Status: Chronic   Current Visit: 

No   





(7) Small bowel obstruction


SNOMED Code(s): 074935561


   Code(s): K56.609 - UNSP INTESTNL OBST, UNSP AS TO PARTIAL VERSUS COMPLETE 

OBST   Status: Resolved   Priority: High   Current Visit: No   





- Problem List Review


Problem List Initiated/Reviewed/Updated: Yes





- My Orders


Last 24 Hours: 


My Active Orders





08/16/18 17:00


Acetaminophen [Tylenol Extra Strength]   500 mg PO Q6H 





08/16/18 18:27


Ketorolac [Toradol]   15 mg IVPUSH Q8H PRN 





08/16/18 20:00


ClonazePAM [KlonoPIN]   1 mg PO BID 


Gabapentin [Neurontin]   800 mg PO TID 


Mometasone/Formoterol [Dulera 200-5 MCG]   2 puff IH BID 


QUEtiapine [SEROquel]   400 mg PO QPM 


Spironolactone [Aldactone]   75 mg PO BID 


hydrOXYzine HCl [Atarax]   50 mg PO TID 


rOPINIRole [Requip]   1.5 mg PO BEDTIME 


risperiDONE [RisperiDAL]   2 mg PO QPM 





08/16/18 Dinner


Nothing per Oral Now Diet [DIET] 





08/17/18 00:45


Sodium Chloride 0.9% [Normal Saline] 1,000 ml IV ASDIRECTED 





08/17/18 07:00


Omeprazole   20 mg PO ACBREAKFAST 





08/17/18 08:00


Nicotine [Habitrol]   21 mg TRDERM DAILY 


QUEtiapine [SEROquel]   100 mg PO QAM 





08/17/18 14:30


buPROPion [Wellbutrin SR]   100 mg PO BID 





08/17/18 17:00


sitaGLIPtin Phos/Metformin HCl [Janumet 50-1,000 MG]   1 tab PO BIDMEALS 





08/17/18 17:35


Blood Glucose Check, Bedside [RC] BIDAC 





08/17/18 20:00


Zolpidem [Ambien]   10 mg PO BEDTIME 





08/18/18 08:00


BASIC METABOLIC PANEL,BMP [CHEM] Routine 


CBC WITH AUTO DIFF [HEME] Routine 


LACTIC ACID [CHEM] Routine 














- Plan


Plan:: 


Admit this 53 yr female with small bowel obstruction:  Place on NPO except 

small amount of ice chips, sips of water with medications.  Control of 

abdominal pain with anti-inflammatories. Recommend up as tolerated to increase 

bowel function.  Recommend no narcotics for her pain and pt in agreement with 

this.  Will order nicotine patch as needed. IV fluid of N/S at 125cc/hr.  

Closely monitor and conservative treatment at this time.  Pt would prefer to 

not have N/G tube if possible.  She has had limited nausea with this.  Pt in 

agreement with this plan.





08/17/18  Pt is feeling some better.  She is ambulating in the halls several 

times today.  Bowel sound improved, abdomen is still distended.  Will check CBC

, BMP and lactic acid in am.  If abdomen is soft in am and passing flatus 

improved, will advance diet to clear liquids.  Monitor RBS bid.  We will change 

IV rate to 75cc/hr.  Pt is agreement with plan.  Discussed plan of care with pt 

1* care nurse KAYLEE Chamberlain.

## 2018-08-18 VITALS — DIASTOLIC BLOOD PRESSURE: 65 MMHG | SYSTOLIC BLOOD PRESSURE: 113 MMHG

## 2018-08-18 RX ADMIN — KETOROLAC TROMETHAMINE PRN MG: 30 INJECTION, SOLUTION INTRAMUSCULAR at 12:17

## 2018-08-18 RX ADMIN — ALOGLIPTIN SCH TAB: 25 TABLET, FILM COATED ORAL at 07:58

## 2018-08-18 RX ADMIN — OMEPRAZOLE SCH MG: 20 CAPSULE, DELAYED RELEASE ORAL at 07:56

## 2018-08-18 RX ADMIN — ALOGLIPTIN SCH MG: 25 TABLET, FILM COATED ORAL at 07:56

## 2018-08-18 RX ADMIN — MOMETASONE FUROATE AND FORMOTEROL FUMARATE DIHYDRATE SCH: 200; 5 AEROSOL RESPIRATORY (INHALATION) at 07:56

## 2018-08-18 NOTE — PCM.PN
- General Info


Date of Service: 08/18/18


Admission Dx/Problem (Free Text): 


 Admission Diagnosis/Problem





Admission Diagnosis/Problem      Bowel obstruction








Subjective Update: 


Pt states several BM today and passing flatus.  States mild tenderness to 

abdomen.





Functional Status: Reports: Pain Controlled, Tolerating Diet, Ambulating





- Review of Systems


General: Reports: No Symptoms


HEENT: Reports: No Symptoms


Pulmonary: Reports: No Symptoms


Cardiovascular: Reports: No Symptoms


Gastrointestinal: Reports: Flatus, Other (BM today).  Denies: Nausea, Vomiting


Genitourinary: Reports: No Symptoms


Musculoskeletal: Reports: No Symptoms


Skin: Reports: No Symptoms


Neurological: Reports: No Symptoms


Psychiatric: Reports: Anxiety





- Patient Data


Vitals - Most Recent: 


 Last Vital Signs











Temp  98.1 F   08/18/18 01:00


 


Pulse  67   08/18/18 01:00


 


Resp  16   08/18/18 01:00


 


BP  113/65   08/18/18 01:00


 


Pulse Ox  95   08/18/18 01:00











Weight - Most Recent: 245 lb 3.2 oz


Lab Results Last 24 Hours: 


 Laboratory Results - last 24 hr











  08/18/18 08/18/18 08/18/18 Range/Units





  08:08 10:50 10:50 


 


WBC   6.0   (4.0-11.0)  K/uL


 


RBC   4.24   (3.80-5.80)  M/uL


 


Hgb   11.0 L   (11.5-16.5)  g/dL


 


Hct   33.1 L   (37.0-47.0)  %


 


MCV   78   (76-96)  fL


 


MCH   25.9 L   (27.0-32.0)  pg


 


MCHC   33.2   (31.0-35.0)  g/dL


 


RDW   14.0   (11.0-16.0)  %


 


Plt Count   154   (150-500)  K/uL


 


MPV   10.5 H   (6.0-10.0)  fL


 


Neut % (Auto)   54.0   (45.0-70.0)  %


 


Lymph % (Auto)   36.7   (20.0-40.0)  %


 


Mono % (Auto)   7.1   (3.0-10.0)  %


 


Eos % (Auto)   1.7   (1.0-5.0)  %


 


Baso % (Auto)   0.5   (0.0-0.5)  %


 


Neut # (Auto)   3.25   (2.00-7.50)  K/uL


 


Lymph # (Auto)   2.21   (1.50-4.00)  K/uL


 


Mono # (Auto)   0.43   (0.20-0.80)  K/uL


 


Eos # (Auto)   0.10   (0.04-0.40)  K/uL


 


Baso # (Auto)   0.03   (0.02-0.10)  K/uL


 


Sodium    140  (136-145)  mmol/L


 


Potassium    3.6  (3.5-5.1)  mmol/L


 


Chloride    107  ()  mmol/L


 


Carbon Dioxide    20.7 L  (21.0-32.0)  mmol/L


 


Anion Gap    15.9 H  (5.0-15.0)  mmol/L


 


BUN    10  D  (8-26)  mg/dL


 


Creatinine    1.13 H  (0.55-1.02)  mg/dL


 


Est Cr Clr Drug Dosing    53.90  mL/min


 


Estimated GFR (MDRD)    50 L  (>60)  MLS/MIN


 


BUN/Creatinine Ratio    8.8  (6-25)  


 


Glucose    141 H  ()  mg/dL


 


POC Glucose  74    ()  mg/dL


 


Calcium    8.0 L  (8.5-10.1)  mg/dL











Med Orders - Current: 


 Current Medications





Acetaminophen (Tylenol Extra Strength)  500 mg PO Q6H [facility]


   Last Admin: 08/18/18 07:57 Dose:  Not Given


Albuterol/Ipratropium (Duoneb 3.0-0.5 Mg/3 Ml)  3 ml INH Q4H PRN


   PRN Reason: Shortness of Breath


Clonazepam (Klonopin)  1 mg PO BID [facility]


   Last Admin: 08/18/18 07:56 Dose:  1 mg


Gabapentin (Neurontin)  800 mg PO TID [facility]


   Last Admin: 08/18/18 07:59 Dose:  800 mg


Hydroxyzine HCl (Atarax)  50 mg PO TID [facility]


   Last Admin: 08/18/18 07:55 Dose:  50 mg


Sodium Chloride (Normal Saline)  1,000 mls @ 75 mls/hr IV ASDIRECTED [facility]


   Last Admin: 08/18/18 01:06 Dose:  75 mls/hr


Ketorolac Tromethamine (Toradol)  15 mg IVPUSH Q8H PRN


   PRN Reason: Abdominal Pain


   Stop: 08/21/18 18:28


   Last Admin: 08/17/18 21:34 Dose:  15 mg


Mometasone Furoate/Formoterol Fumar (Dulera 200-5 Mcg)  2 puff IH BID [facility]


   Last Admin: 08/18/18 07:56 Dose:  Not Given


Nicotine (Habitrol)  21 mg TRDERM DAILY [facility]


   Last Admin: 08/18/18 07:56 Dose:  21 mg


Non-Formulary Medication ( Bupropion [ Wellbutrin Sr] 100 Mg)  100 mg PO BID [facility]


   Last Admin: 08/18/18 07:56 Dose:  100 mg


Patients Own ** ( Sitagliptin Phos/Metformin Hcl [ Janumet 50-1,000 Mg] 1 Ta  1 

tab PO BIDMEALS [facility]


   Last Admin: 08/18/18 07:58 Dose:  1 tab


Omeprazole (Omeprazole)  20 mg PO ACBREAKFAST [facility]


   Last Admin: 08/18/18 07:56 Dose:  20 mg


Quetiapine Fumarate (Seroquel)  400 mg PO QPM [facility]


   Last Admin: 08/17/18 21:33 Dose:  400 mg


Quetiapine Fumarate (Seroquel)  100 mg PO QAM [facility]


   Last Admin: 08/18/18 07:55 Dose:  100 mg


Risperidone (Risperidal)  2 mg PO QPM [facility]


   Last Admin: 08/17/18 21:25 Dose:  2 mg


Ropinirole HCl (Requip)  1.5 mg PO BEDTIME [facility]


   Last Admin: 08/17/18 21:23 Dose:  1.5 mg


Spironolactone (Aldactone)  75 mg PO BID [facility]


   Last Admin: 08/18/18 07:56 Dose:  75 mg


Tramadol HCl (Ultram)  50 mg PO Q6H PRN


   PRN Reason: Abdominal Pain


   Last Admin: 08/18/18 07:55 Dose:  50 mg


Zolpidem Tartrate (Ambien)  10 mg PO BEDTIME [facility]


   Last Admin: 08/17/18 21:19 Dose:  10 mg





Discontinued Medications





Gabapentin (Neurontin) Confirm Administered Dose 800 mg .ROUTE .STK-MED ONE


   Stop: 08/16/18 21:10


   Last Admin: 08/16/18 21:24 Dose:  Not Given


Gabapentin (Neurontin) Confirm Administered Dose 800 mg .ROUTE .STK-MED ONE


   Stop: 08/17/18 09:00


   Last Admin: 08/17/18 11:23 Dose:  Not Given


Gabapentin (Neurontin) Confirm Administered Dose 800 mg .ROUTE .STK-MED ONE


   Stop: 08/17/18 16:13


   Last Admin: 08/17/18 17:46 Dose:  Not Given


Gabapentin (Neurontin) Confirm Administered Dose 800 mg .ROUTE .STK-MED ONE


   Stop: 08/17/18 21:30


   Last Admin: 08/17/18 21:34 Dose:  Not Given


Gabapentin (Neurontin) Confirm Administered Dose 800 mg .ROUTE .STK-MED ONE


   Stop: 08/18/18 08:04


Ketorolac Tromethamine (Toradol) Confirm Administered Dose 30 mg .ROUTE .STK-

MED ONE


   Stop: 08/16/18 18:32


   Last Admin: 08/16/18 21:00 Dose:  Not Given


Quetiapine Fumarate (Quetiapine Fumarate) Confirm Administered Dose 400 mg PO 

.STK-MED ONE


   Stop: 08/16/18 21:11


   Last Admin: 08/16/18 21:24 Dose:  Not Given


Quetiapine Fumarate (Quetiapine Fumarate) Confirm Administered Dose 400 mg PO 

.STK-MED ONE


   Stop: 08/17/18 21:31


   Last Admin: 08/17/18 21:34 Dose:  Not Given


Zolpidem Tartrate (Ambien)  12.5 mg PO BEDTIME [facility]


   Last Admin: 08/17/18 00:48 Dose:  Not Given


Zolpidem Tartrate (Ambien) Confirm Administered Dose 15 mg .ROUTE .STK-MED ONE


   Stop: 08/16/18 21:17


   Last Admin: 08/16/18 21:36 Dose:  Not Given


Zolpidem Tartrate (Ambien)  10 mg PO ONETIME ONE


   Stop: 08/16/18 21:46


   Last Admin: 08/16/18 21:37 Dose:  10 mg











- Exam


Quality Assessment: No: Skin Breakdown


General: Alert, Oriented, Cooperative, No Acute Distress


HEENT: Mucous Membr. Moist/Pink


Neck: Supple, Trachea Midline


Lungs: Clear to Auscultation, Normal Respiratory Effort, Rhonchi


Cardiovascular: Regular Rate


GI/Abdominal Exam: Normal Bowel Sounds, Soft, Tender.  No: Guarding, Rigid, 

Rebound


Back Exam: Normal Inspection, Full Range of Motion


Extremities: Normal Inspection, Normal Range of Motion, Normal Capillary Refill


Skin: Warm, Dry


Neurological: No New Focal Deficit


Psy/Mental Status: Alert, Normal Affect, Normal Mood





- Problem List & Annotations


(1) Chronic pain


SNOMED Code(s): 69687160


   Code(s): G89.29 - OTHER CHRONIC PAIN   Status: Acute   





(2) Asthma


SNOMED Code(s): 987704566


   Code(s): J45.909 - UNSPECIFIED ASTHMA, UNCOMPLICATED   Status: Chronic   


Qualifiers: 


   Asthma severity: mild   Asthma persistence: intermittent   Asthma 

complication type: uncomplicated   Qualified Code(s): J45.20 - Mild 

intermittent asthma, uncomplicated   





(3) Depression


SNOMED Code(s): 45677714


   Code(s): F32.9 - MAJOR DEPRESSIVE DISORDER, SINGLE EPISODE, UNSPECIFIED   

Status: Chronic   





(4) GERD (gastroesophageal reflux disease)


SNOMED Code(s): 423213818


   Code(s): K21.9 - GASTRO-ESOPHAGEAL REFLUX DISEASE WITHOUT ESOPHAGITIS   

Status: Chronic   





(5) HTN (hypertension)


SNOMED Code(s): 27781348


   Code(s): I10 - ESSENTIAL (PRIMARY) HYPERTENSION   Status: Chronic   


Qualifiers: 


   Hypertension type: essential hypertension   Qualified Code(s): I10 - 

Essential (primary) hypertension   





(6) Restless leg


SNOMED Code(s): 52309651


   Code(s): G25.81 - RESTLESS LEGS SYNDROME   Status: Chronic   





(7) Small bowel obstruction


SNOMED Code(s): 506334681


   Code(s): K56.609 - UNSP INTESTNL OBST, UNSP AS TO PARTIAL VERSUS COMPLETE 

OBST   Status: Resolved   Priority: High   





- Problem List Review


Problem List Initiated/Reviewed/Updated: Yes





- My Orders


Last 24 Hours: 


My Active Orders





08/17/18 14:30


buPROPion [Wellbutrin SR]   100 mg PO BID 





08/17/18 17:00


sitaGLIPtin Phos/Metformin HCl [Janumet 50-1,000 MG]   1 tab PO BIDMEALS 





08/17/18 17:35


Blood Glucose Check, Bedside [RC] BIDAC 





08/17/18 20:00


Zolpidem [Ambien]   10 mg PO BEDTIME 














- Plan


Plan:: 


Admit this 53 yr female with small bowel obstruction:  Place on NPO except 

small amount of ice chips, sips of water with medications.  Control of 

abdominal pain with anti-inflammatories. Recommend up as tolerated to increase 

bowel function.  Recommend no narcotics for her pain and pt in agreement with 

this.  Will order nicotine patch as needed. IV fluid of N/S at 125cc/hr.  

Closely monitor and conservative treatment at this time.  Pt would prefer to 

not have N/G tube if possible.  She has had limited nausea with this.  Pt in 

agreement with this plan.





08/17/18  Pt is feeling some better.  She is ambulating in the halls several 

times today.  Bowel sound improved, abdomen is still distended.  Will check CBC

, BMP and lactic acid in am.  If abdomen is soft in am and passing flatus 

improved, will advance diet to clear liquids.  Monitor RBS bid.  We will change 

IV rate to 75cc/hr.  Pt is agreement with plan.  Discussed plan of care with pt 

1* care nurse KAYLEE Chamberlain.





08/18/2018  Will advance diet to soft diet.  Will discharge today if tolerating 

soft diet.  RTC or PCP next week for follow-up.

## 2019-01-05 ENCOUNTER — HOSPITAL ENCOUNTER (EMERGENCY)
Dept: HOSPITAL 60 - LB.ED | Age: 54
Discharge: HOME | End: 2019-01-05
Payer: MEDICARE

## 2019-01-05 VITALS — SYSTOLIC BLOOD PRESSURE: 133 MMHG | DIASTOLIC BLOOD PRESSURE: 66 MMHG
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
## 2019-01-05 DIAGNOSIS — S80.02XA: Primary | ICD-10-CM

## 2019-01-05 DIAGNOSIS — S80.01XA: ICD-10-CM

## 2019-01-05 DIAGNOSIS — Z88.8: ICD-10-CM
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
## 2019-01-05 DIAGNOSIS — Z91.048: ICD-10-CM

## 2019-01-05 DIAGNOSIS — Z91.81: ICD-10-CM

## 2019-01-05 PROCEDURE — 73620 X-RAY EXAM OF FOOT: CPT

## 2019-01-05 PROCEDURE — 73560 X-RAY EXAM OF KNEE 1 OR 2: CPT

## 2019-01-05 PROCEDURE — 96372 THER/PROPH/DIAG INJ SC/IM: CPT

## 2019-01-05 PROCEDURE — 99283 EMERGENCY DEPT VISIT LOW MDM: CPT

## 2019-01-05 NOTE — CONS
DATE OF CONSULTATION:  01/05/2019

 

RIGHT FOOT, 5 JANUARY 2019:  There is mild diffuse osteopenia.  There are mild

osteoarthritic changes involving multiple joints.

 

There is a plantar calcaneal spur.

 

No acute fracture or dislocation.  No focal lytic or blastic bone lesions.

 

 

ESTEFANÍA/RENAY

DD:  01/05/2019 10:05:56

DT:  01/05/2019 17:47:26

Job #:  [id number]

## 2019-01-05 NOTE — CONS
DATE OF CONSULTATION:  01/05/2019

 

LEFT FOOT, 5 JANUARY 2019:  There is diffuse osteopenia.

 

There are mild osteoarthritic changes involving multiple

joints.  There is a plantar calcaneal spur.

 

No acute fracture or dislocation.

 

No lytic or blastic bone lesions.

 

 

ESTEFANÍA/RENAY

DD:  01/05/2019 10:05:56

DT:  01/05/2019 17:51:41

Job #:  [id number]

## 2019-01-05 NOTE — ER
HISTORY OF PRESENT ILLNESS:  The patient is a 53-year-old female who comes into 
the ER with

a chief complaint of leg pain.  She notes both legs hurt, in fact she notes she 
hurts all

over, not just in her legs.  She fell yesterday twice, the last time being 
around 3 o'clock

in the afternoon.  She ambulated into the emergency room noting she has severe 
pain all

over, primarily in her legs and feet.  The patient had some difficulty in 
isolating exactly

where the pain was mostly, but ended up settling for her feet and her right 
knee.  In the

course of doing this, the patient lifted her legs up a couple of times, showing 
me which knee

and where it hurt.  Essentially doing a negative straight leg raise.  The 
patient initially

stated she had no allergies, however, when offered she told me she could not 
take Toradol because

she is on Cymbalta.  I did note that was fine, we would just not give it to 
her.  She decided

she wanted it, but otherwise has no drug allergies.  She then also noted she 
could not take

ibuprofen, but it is on her medication list.  Her medication list consists of 
Cymbalta 60 mg

p.o. b.i.d., albuterol 2.5 q.4 p.r.n., acetaminophen 500 mg p.o. q.6, ibuprofen 
800 mg p.o.

t.i.d., but she states she is out of this and does not have any.  Gabapentin 
1200 mg p.o.

t.i.d., clonazepam 1 mg p.o. b.i.d., calcium carbonate, budesonide, formoterol, 
Symbicort

164.5 two puffs b.i.d., albuterol 1-2 puffs q.4 p.r.n., Imitrex 25 mg p.o. as 
directed,

quetiapine 400 mg q.p.m., quetiapine 100 mg q.a.m., omeprazole 20 mg p.o. daily
, meloxicam

15 mg p.o. daily, ipratropium 1-2 puffs, risperidone 2.5 mg p.o. q.p.m., 
hydroxyzine 1-3

tablets p.o. t.i.d., Wellbutrin 100 mg p.o. b.i.d., zolpidem 12.5 p.o. daily, 
spironolactone

3 tablets p.o. b.i.d., Zanaflex 4 mg p.o. t.i.d., and sitagliptin/metformin 50/
1000.

 

PHYSICAL EXAMINATION: /66, P 91, R 18,O2 sat 98%, T 98.3

GENERAL:  She is alert, oriented, and quite dramatic.  LUNGS:  Clear.  HEART:  
Regular sinus

rhythm.  The patient has some minimal abrasions on the left knee and that is 
all I can see on

her lower extremities.  She does not have any swelling.  She does not have any 
effusion in

the knees.  Really nothing, there is no ecchymosis or swelling on her feet or 
toes.  X-ray

was obtained of the toes and knee, both of which are negative.

 

ASSESSMENT:  Contusions, status post fall.

 

PLAN:  The patient does have a historically demonstrated affinity for pain 
medicine. Giving

her the benefit of the doubt, I did give her some tramadol 1 take-home pack and

some ibuprofen.  She was instructed not to take the ibuprofen for at least 8 
hours as she

just got the Toradol, and to  not take  meloxicam with it when she noted she 
was no longer taking it either.

 

 

SHEILA/RENAY

DD:  01/05/2019 09:48:37

DT:  01/05/2019 11:58:40

Job #:  [id number]

MTDD

## 2019-01-05 NOTE — CONS
DATE OF CONSULTATION:  01/05/2019

 

RIGHT KNEE, 5 JANUARY 2019:  There is diffuse osteopenia.

 

No acute fracture or dislocation.

 

No lytic or blastic bone lesions.

 

No joint effusion.

 

 

ESTEFANÍA/RENAY

DD:  01/05/2019 10:05:56

DT:  01/05/2019 17:54:12

Job #:  [id number]

## 2019-01-07 NOTE — CR
DATE OF SERVICE:  01/05/2019

CLINICAL DATA:  Pain.



RIGHT FOOT: 



There is mild diffuse osteopenia. There are mild osteoarthritic changes 
involving multiple joints.



There is a plantar calcaneal spur.



No acute fracture or dislocation. No focal lytic or blastic bone lesions.

MTDD

## 2019-01-07 NOTE — CR
DATE OF SERVICE:  01/05/2019

CLINICAL DATA:  Pain, status post fall.



LEFT FOOT: 



There is diffuse osteopenia.



There are mild osteoarthritic changes involving multiple joints. There is a 
plantar calcaneal spur.



No acute fracture or dislocation.



No lytic or blastic bone lesions.

MTDD

## 2019-03-21 ENCOUNTER — HOSPITAL ENCOUNTER (OUTPATIENT)
Dept: HOSPITAL 60 - LB.ED | Age: 54
Setting detail: OBSERVATION
Discharge: HOME | End: 2019-03-21
Attending: FAMILY MEDICINE | Admitting: FAMILY MEDICINE
Payer: MEDICARE

## 2019-03-21 VITALS — DIASTOLIC BLOOD PRESSURE: 62 MMHG | SYSTOLIC BLOOD PRESSURE: 138 MMHG

## 2019-03-21 DIAGNOSIS — R29.898: ICD-10-CM

## 2019-03-21 DIAGNOSIS — E66.9: ICD-10-CM

## 2019-03-21 DIAGNOSIS — Z79.84: ICD-10-CM

## 2019-03-21 DIAGNOSIS — Z79.899: ICD-10-CM

## 2019-03-21 DIAGNOSIS — J45.909: ICD-10-CM

## 2019-03-21 DIAGNOSIS — R41.89: Primary | ICD-10-CM

## 2019-03-21 DIAGNOSIS — E11.9: ICD-10-CM

## 2019-03-21 DIAGNOSIS — T50.905A: ICD-10-CM

## 2019-03-21 PROCEDURE — G0378 HOSPITAL OBSERVATION PER HR: HCPCS

## 2019-03-21 PROCEDURE — A0429 BLS-EMERGENCY: HCPCS

## 2019-03-21 PROCEDURE — A0425 GROUND MILEAGE: HCPCS

## 2019-03-21 NOTE — EDM.PDOC
ED HPI GENERAL MEDICAL PROBLEM





- General


Chief Complaint: Neuro Symptoms/Deficits


Stated Complaint: AWAKE BUT NOT VERBAL


Time Seen by Provider: 19 04:29


Source of Information: Reports: Patient, Significant Other


History Limitations: Reports: Altered Mental Status





- History of Present Illness


INITIAL COMMENTS - FREE TEXT/NARRATIVE: 





This is a 53yo F brought in by EMS for being awake but not responding verbally 

or moving much. She was found incontinent at home in bed. The significant other 

states he woke up and noticed she was like this shortly after waking. Patient 

does have some improvement on arrival in the ER. She is able to verbalize 

slowly but does not recall much that has happened. Per her significant other 

the patient did start on Baclofen yesterday. Patient did not recall this and 

states she has been taking all her meds regularly without changes. There have 

been no falls or injuries or illness recently. 


Duration: Constant


Location: Reports: Generalized


Severity: Moderate


Improves with: Reports: None


Worsens with: Reports: None


Associated Symptoms: Reports: Confusion, Weakness





- Related Data


 Allergies











Allergy/AdvReac Type Severity Reaction Status Date / Time


 


adhesive tape Allergy  Rash Verified 19 08:46


 


celecoxib [From Celebrex] Allergy  Nausea and Verified 19 08:46





   Vomiting  











Home Meds: 


 Home Meds





Ipratropium/Albuterol Sulfate [Duoneb 0.5 MG-3 MG/3 ML] 1 - 2 puff INH Q4H PRN 

13 [History]


Omeprazole 20 mg PO ACBREAKFAST 13 [History]


risperiDONE [RisperiDAL M-Tab] 2.5 mg PO QPM 13 [History]


Acetaminophen [Tylenol Extra Strength] 500 mg PO Q6H 11/21/15 [History]


Budesonide/Formoterol [Symbicort 160-4.5 MCG] 2 puff INH BID 11/21/15 [History]


Gabapentin [Neurontin] 1,200 tab PO TID 11/21/15 [History]


QUEtiapine [SEROquel] 100 tab PO QAM 11/21/15 [History]


Spironolactone [Aldactone] 3 tab PO BID 11/21/15 [History]


hydrOXYzine HCl [hydrOXYzine] 1 - 3 cap PO TID 11/21/15 [History]


ClonazePAM [KlonoPIN] 1 mg PO BID 12/10/17 [History]


QUEtiapine Fumarate [Quetiapine Fumarate] 400 mg PO QPM 12/10/17 [History]


sitaGLIPtin Phos/Metformin HCl [Janumet 50-1,000 MG] 1 tab PO BIDMEALS 18 

[History]


Albuterol Sulfate [Proair Hfa] 1 - 2 puff IH Q4HR PRN 18 [History]


Calcium Carbonate/Vitamin D3 [Calcium 600 + D3 Softgel] 1 tab PO BID 18 [

History]


Ibuprofen [Motrin] 800 mg PO TID PRN 18 [History]


SUMAtriptan [Imitrex] 25 mg PO ASDIRECTED PRN 18 [History]


DULoxetine HCl [Cymbalta] 60 mg PO BID 19 [History]


Baclofen 10 mg PO TID 19 [History]


Ciprofloxacin HCl [Cipro] 500 mg PO BID 19 [History]


Ubidecarenone [Co Q-10] 100 mg PO DAILY 19 [History]


Zolpidem Tartrate [Zolpidem Tartrate ER] 12.5 mg PO QPM 19 [History]


rOPINIRole [Requip] 5 mg PO DAILY 19 [History]











Past Medical History


HEENT History: Reports: Impaired Vision, Other (See Below)


Other HEENT History: glasses for reading,


Cardiovascular History: Reports: Hypertension


Respiratory History: Reports: Asthma, Bronchitis, Recurrent, Pneumonia, 

Recurrent, Other (See Below)


Other Respiratory History: Emphysema


Gastrointestinal History: Reports: Bowel Obstruction, GERD, Irritable Bowel 

Syndrome


Genitourinary History: Reports: None


OB/GYN History: Reports: Dysfunctional Uterine Bleeding, Pregnancy


Musculoskeletal History: Reports: Back Pain, Chronic


Other Musculoskeletal History: back / butt pain, shoots down left leg


Neurological History: Reports: Migraines, Neuropathy, Peripheral


Psychiatric History: Reports: Anxiety, Depression, Suicide Attempt, Suicidal 

Ideation


Endocrine/Metabolic History: Reports: Diabetes, Type II, Obesity/BMI 30+


Hematologic History: Reports: Blood Transfusion(s), Iron Deficiency


Dermatologic History: Reports: Eczema, Other (See Below)


Other Dermatologic History: Allergy to paper tape





- Infectious Disease History


Infectious Disease History: Reports: Chicken Pox, Measles, Rubella





- Past Surgical History


GI Surgical History: Reports: None


Female  Surgical History: Reports:  Section, Hysterectomy


Musculoskeletal Surgical History: Reports: None





Social & Family History





- Family History


Family Medical History: Noncontributory


Cardiac: Reports: None


Musculoskeletal: Reports: Arthritis, Back pain, Chronic


Neurological: Reports: None


Psychiatric: Reports: Depression


Endocrine/Metabolic: Reports: None


Oncologic: Reports: None





- Caffeine Use


Caffeine Use: Reports: Soda


Other Caffeine Use: 2 cups a day





ED ROS GENERAL





- Review of Systems


Review Of Systems: ROS reveals no pertinent complaints other than HPI.





ED EXAM, GENERAL





- Physical Exam


Exam: See Below


Exam Limited By: No Limitations


General Appearance: Alert, WD/WN, No Apparent Distress


Eye Exam: Bilateral Eye: EOMI, Other (sluggish pupils 4.5mm b/l equal)


Ears: Normal External Exam


Nose: Normal Inspection


Throat/Mouth: Normal Inspection, Other (dry lips)


Head: Atraumatic, Normocephalic


Neck: Normal Inspection, Supple, Non-Tender


Respiratory/Chest: No Respiratory Distress, Lungs Clear, Normal Breath Sounds, 

No Accessory Muscle Use, Chest Non-Tender


Cardiovascular: Normal Peripheral Pulses, Regular Rate, Rhythm, No Edema


Peripheral Pulses: 2+: Dorsalis Pedis (L), Dorsalis Pedis (R)


GI/Abdominal: Normal Bowel Sounds


Back Exam: Normal Inspection


Extremities: Normal Inspection, Normal Range of Motion, Non-Tender, No Pedal 

Edema, Normal Capillary Refill


Neurological: Alert, Other (b/l lower leg weakness 3/5)


Psychiatric: Flat Affect


Skin Exam: Warm, Dry, Intact





Course





- Vital Signs


Last Recorded V/S: 


 Last Vital Signs











Temp  36.0 C   19 14:00


 


Pulse  98   19 14:00


 


Resp  20   19 14:00


 


BP  138/62   19 14:00


 


Pulse Ox  98   19 14:00














- Orders/Labs/Meds


Orders: 


 Active Orders 24 hr











 Category Date Time Status


 


 Glucose [Blood Glucose Check, Bedside] [RC] ONETIME Care  19 04:45 Active


 


 Sodium Chloride 0.9% [Saline Flush] Med  19 04:46 Active





 10 ml FLUSH ASDIRECTED PRN   


 


 Peripheral IV Insertion Adult [OM.PC] Routine Oth  19 04:46 Ordered








 Medication Orders





Sodium Chloride (Saline Flush)  10 ml FLUSH ASDIRECTED PRN


   PRN Reason: Keep Vein Open








Labs: 


 Laboratory Tests











  19 Range/Units





  04:41 05:30 05:30 


 


WBC   7.5  D   (4.0-11.0)  K/uL


 


RBC   4.67   (3.80-5.80)  M/uL


 


Hgb   13.0   (11.5-16.5)  g/dL


 


Hct   39.5   (37.0-47.0)  %


 


MCV   85   (76-96)  fL


 


MCH   27.8   (27.0-32.0)  pg


 


MCHC   32.9   (31.0-35.0)  g/dL


 


RDW   14.1   (11.0-16.0)  %


 


Plt Count   165   (150-500)  K/uL


 


MPV   10.8 H   (6.0-10.0)  fL


 


Neut % (Auto)   81.1 H   (45.0-70.0)  %


 


Lymph % (Auto)   12.4 L   (20.0-40.0)  %


 


Mono % (Auto)   5.3   (3.0-10.0)  %


 


Eos % (Auto)   0.9 L   (1.0-5.0)  %


 


Baso % (Auto)   0.3   (0.0-0.5)  %


 


Neut # (Auto)   6.09   (2.00-7.50)  K/uL


 


Lymph # (Auto)   0.93 L   (1.50-4.00)  K/uL


 


Mono # (Auto)   0.40   (0.20-0.80)  K/uL


 


Eos # (Auto)   0.07   (0.04-0.40)  K/uL


 


Baso # (Auto)   0.02   (0.02-0.10)  K/uL


 


Sodium    144  (136-145)  mmol/L


 


Potassium    3.7  (3.5-5.1)  mmol/L


 


Chloride    107  ()  mmol/L


 


Carbon Dioxide    22.5  (21.0-32.0)  mmol/L


 


Anion Gap    18.2 H  (5.0-15.0)  mmol/L


 


BUN    11  (8-26)  mg/dL


 


Creatinine    1.02  (0.55-1.02)  mg/dL


 


Est Cr Clr Drug Dosing    TNP  


 


Estimated GFR (MDRD)    56 L  (>60)  MLS/MIN


 


BUN/Creatinine Ratio    10.8  (6-25)  


 


Glucose    150 H D  ()  mg/dL


 


POC Glucose  136 H    ()  mg/dL


 


Calcium    8.8  (8.5-10.1)  mg/dL


 


Total Bilirubin    0.5  D  (0.0-1.0)  mg/dL


 


AST    51 H  (15-37)  U/L


 


ALT    37  (12-78)  U/L


 


Alkaline Phosphatase    91  ()  U/L


 


Total Protein    7.4  (6.4-8.2)  g/dL


 


Albumin    3.6  (3.4-5.0)  g/dL


 


Globulin    3.8  (2.2-4.2)  g/dL


 


Albumin/Globulin Ratio    0.9  (0.8-2.0)  


 


TSH, Ultra Sensitive    2.296  (0.358-3.740)  uIU/mL


 


Urine Color     


 


Urine Appearance     (CLEAR)  


 


Urine pH     (5.0-8.0)  


 


Ur Specific Gravity     (1.003-1.030)  


 


Urine Protein     (NEGATIVE)  mg/dL


 


Urine Glucose (UA)     (NEGATIVE)  mg/dL


 


Urine Ketones     (NEGATIVE)  mg/dL


 


Urine Occult Blood     (NEGATIVE)  


 


Urine Nitrite     (NEGATIVE)  


 


Urine Bilirubin     (NEGATIVE)  


 


Urine Urobilinogen     (0.2-1.0)  E.U./dL


 


Ur Leukocyte Esterase     (NEGATIVE)  


 


Urine RBC     /HPF


 


Urine WBC     /HPF


 


Urine WBC Clumps     /HPF


 


Ur Squamous Epith Cells     /HPF


 


Urine Bacteria     /HPF


 


Urine Opiates Screen     (NEGATIVE)  


 


Ur Oxycodone Screen     (NEGATIVE)  


 


Urine Methadone Screen     (NEGATIVE)  


 


Ur Barbiturates Screen     (NEGATIVE)  


 


Ur Tricyclics Screen     (NEGATIVE)  


 


Ur Phencyclidine Scrn     (NEGATIVE)  


 


Ur Amphetamine Screen     (NEGATIVE)  


 


Urine MDMA Screen     (NEGATIVE)  


 


U Benzodiazepines Scrn     (NEGATIVE)  


 


U Cocaine Metab Screen     (NEGATIVE)  


 


U Marijuana (THC) Screen     (NEGATIVE)  














  19 Range/Units





  05:45 05:45 07:26 


 


WBC     (4.0-11.0)  K/uL


 


RBC     (3.80-5.80)  M/uL


 


Hgb     (11.5-16.5)  g/dL


 


Hct     (37.0-47.0)  %


 


MCV     (76-96)  fL


 


MCH     (27.0-32.0)  pg


 


MCHC     (31.0-35.0)  g/dL


 


RDW     (11.0-16.0)  %


 


Plt Count     (150-500)  K/uL


 


MPV     (6.0-10.0)  fL


 


Neut % (Auto)     (45.0-70.0)  %


 


Lymph % (Auto)     (20.0-40.0)  %


 


Mono % (Auto)     (3.0-10.0)  %


 


Eos % (Auto)     (1.0-5.0)  %


 


Baso % (Auto)     (0.0-0.5)  %


 


Neut # (Auto)     (2.00-7.50)  K/uL


 


Lymph # (Auto)     (1.50-4.00)  K/uL


 


Mono # (Auto)     (0.20-0.80)  K/uL


 


Eos # (Auto)     (0.04-0.40)  K/uL


 


Baso # (Auto)     (0.02-0.10)  K/uL


 


Sodium     (136-145)  mmol/L


 


Potassium     (3.5-5.1)  mmol/L


 


Chloride     ()  mmol/L


 


Carbon Dioxide     (21.0-32.0)  mmol/L


 


Anion Gap     (5.0-15.0)  mmol/L


 


BUN     (8-26)  mg/dL


 


Creatinine     (0.55-1.02)  mg/dL


 


Est Cr Clr Drug Dosing     


 


Estimated GFR (MDRD)     (>60)  MLS/MIN


 


BUN/Creatinine Ratio     (6-25)  


 


Glucose     ()  mg/dL


 


POC Glucose    134 H  ()  mg/dL


 


Calcium     (8.5-10.1)  mg/dL


 


Total Bilirubin     (0.0-1.0)  mg/dL


 


AST     (15-37)  U/L


 


ALT     (12-78)  U/L


 


Alkaline Phosphatase     ()  U/L


 


Total Protein     (6.4-8.2)  g/dL


 


Albumin     (3.4-5.0)  g/dL


 


Globulin     (2.2-4.2)  g/dL


 


Albumin/Globulin Ratio     (0.8-2.0)  


 


TSH, Ultra Sensitive     (0.358-3.740)  uIU/mL


 


Urine Color   Yellow   


 


Urine Appearance   Cloudy   (CLEAR)  


 


Urine pH   5.5   (5.0-8.0)  


 


Ur Specific Gravity   1.020   (1.003-1.030)  


 


Urine Protein   100 H   (NEGATIVE)  mg/dL


 


Urine Glucose (UA)   Negative   (NEGATIVE)  mg/dL


 


Urine Ketones   Negative   (NEGATIVE)  mg/dL


 


Urine Occult Blood   Moderate H   (NEGATIVE)  


 


Urine Nitrite   Positive H   (NEGATIVE)  


 


Urine Bilirubin   Negative   (NEGATIVE)  


 


Urine Urobilinogen   0.2   (0.2-1.0)  E.U./dL


 


Ur Leukocyte Esterase   Large H   (NEGATIVE)  


 


Urine RBC   10-20 H   /HPF


 


Urine WBC   Packed H   /HPF


 


Urine WBC Clumps   Few   /HPF


 


Ur Squamous Epith Cells   Not seen   /HPF


 


Urine Bacteria   Moderate H   /HPF


 


Urine Opiates Screen  Negative    (NEGATIVE)  


 


Ur Oxycodone Screen  Negative    (NEGATIVE)  


 


Urine Methadone Screen  Negative    (NEGATIVE)  


 


Ur Barbiturates Screen  Negative    (NEGATIVE)  


 


Ur Tricyclics Screen  Positive H    (NEGATIVE)  


 


Ur Phencyclidine Scrn  Negative    (NEGATIVE)  


 


Ur Amphetamine Screen  Negative    (NEGATIVE)  


 


Urine MDMA Screen  Negative    (NEGATIVE)  


 


U Benzodiazepines Scrn  Negative    (NEGATIVE)  


 


U Cocaine Metab Screen  Negative    (NEGATIVE)  


 


U Marijuana (THC) Screen  Positive H    (NEGATIVE)  











Meds: 


Medications











Generic Name Dose Route Start Last Admin





  Trade Name Freq  PRN Reason Stop Dose Admin


 


Sodium Chloride  10 ml  19 04:46  





  Saline Flush  FLUSH   





  ASDIRECTED PRN   





  Keep Vein Open   





     





     





     














Departure





- Departure


Time of Disposition: 06:00


Disposition: Refer to Observation


Condition: Good


Clinical Impression: 


 Weakness of both legs, Cognitive changes





Medication reaction


Qualifiers:


 Encounter type: initial encounter Qualified Code(s): T50.905A - Adverse effect 

of unspecified drugs, medicaments and biological substances, initial encounter








- Discharge Information





- Problem List & Annotations


(1) Cognitive changes


SNOMED Code(s): 596448687


   Code(s): R41.89 - OTH SYMPTOMS AND SIGNS W COGNITIVE FUNCTIONS AND AWARENESS

   Status: Acute   Priority: High   Current Visit: Yes   





(2) Medication reaction


SNOMED Code(s): 03338444


   Code(s): T50.905A - ADVERSE EFFECT OF UNSP DRUG/MEDS/BIOL SUBST, INIT   

Status: Acute   Priority: High   Current Visit: Yes   


Qualifiers: 


   Encounter type: initial encounter   Qualified Code(s): T50.905A - Adverse 

effect of unspecified drugs, medicaments and biological substances, initial 

encounter   





(3) Weakness of both legs


SNOMED Code(s): 0733234


   Code(s): R29.898 - OTH SYMPTOMS AND SIGNS INVOLVING THE MUSCULOSKELETAL 

SYSTEM   Status: Acute   Priority: High   Current Visit: Yes   





- Problem List Review


Problem List Initiated/Reviewed/Updated: Yes





- My Orders


Last 24 Hours: 


My Active Orders





19 04:45


Glucose [Blood Glucose Check, Bedside] [RC] ONETIME 





19 04:46


Sodium Chloride 0.9% [Saline Flush]   10 ml FLUSH ASDIRECTED PRN 


Peripheral IV Insertion Adult [OM.PC] Routine 














- Assessment/Plan


Last 24 Hours: 


My Active Orders





19 04:45


Glucose [Blood Glucose Check, Bedside] [RC] ONETIME 





19 04:46


Sodium Chloride 0.9% [Saline Flush]   10 ml FLUSH ASDIRECTED PRN 


Peripheral IV Insertion Adult [OM.PC] Routine 











Plan: 





It is likely that patient has taken too many medications from polypharmacy. We 

will continue to hydrate and monitor symptoms. Her symptoms have gradually 

improved but she still has difficulty with gait, memory and confusion. We will 

hold baclofen as this is a new medication started yesterday. We will review 

medications today. Patient agrees with plan of care and significant other 

present.

## 2019-03-21 NOTE — PCM.DCSUM1
**Discharge Summary





- Discharge Data


Discharge Date: 03/21/19


Discharge Disposition: Home, Self-Care 01


Condition: Good





- Discharge Diagnosis/Problem(s)


(1) Cognitive changes


SNOMED Code(s): 689955600


   ICD Code: R41.89 - OTH SYMPTOMS AND SIGNS W COGNITIVE FUNCTIONS AND 

AWARENESS   Status: Resolved   Priority: High   Current Visit: Yes   





(2) Medication reaction


SNOMED Code(s): 83007516


   ICD Code: T50.905A - ADVERSE EFFECT OF UNSP DRUG/MEDS/BIOL SUBST, INIT   

Status: Resolved   Priority: High   Current Visit: Yes   


Qualifiers: 


   Encounter type: initial encounter   Qualified Code(s): T50.905A - Adverse 

effect of unspecified drugs, medicaments and biological substances, initial 

encounter   





(3) Weakness of both legs


SNOMED Code(s): 0141506


   ICD Code: R29.898 - OTH SYMPTOMS AND SIGNS INVOLVING THE MUSCULOSKELETAL 

SYSTEM   Status: Resolved   Priority: High   Current Visit: Yes   





- Patient Instructions


Diet: Usual Diet as Tolerated


Activity: As Tolerated


Driving: Do Not Drive





- Discharge Plan


Home Medications: 


 Home Meds





Ipratropium/Albuterol Sulfate [Duoneb 0.5 MG-3 MG/3 ML] 1 - 2 puff INH Q4H PRN 

09/29/13 [History]


Omeprazole 20 mg PO ACBREAKFAST 09/29/13 [History]


risperiDONE [RisperiDAL M-Tab] 2.5 mg PO QPM 09/29/13 [History]


Acetaminophen [Tylenol Extra Strength] 500 mg PO Q6H 11/21/15 [History]


Budesonide/Formoterol [Symbicort 160-4.5 MCG] 2 puff INH BID 11/21/15 [History]


Gabapentin [Neurontin] 1,200 tab PO TID 11/21/15 [History]


QUEtiapine [SEROquel] 100 tab PO QAM 11/21/15 [History]


Spironolactone [Aldactone] 3 tab PO BID 11/21/15 [History]


hydrOXYzine HCl [hydrOXYzine] 1 - 3 cap PO TID 11/21/15 [History]


ClonazePAM [KlonoPIN] 1 mg PO BID 12/10/17 [History]


QUEtiapine Fumarate [Quetiapine Fumarate] 400 mg PO QPM 12/10/17 [History]


sitaGLIPtin Phos/Metformin HCl [Janumet 50-1,000 MG] 1 tab PO BIDMEALS 01/26/18 

[History]


Albuterol Sulfate [Proair Hfa] 1 - 2 puff IH Q4HR PRN 11/28/18 [History]


Calcium Carbonate/Vitamin D3 [Calcium 600 + D3 Softgel] 1 tab PO BID 11/28/18 [

History]


Ibuprofen [Motrin] 800 mg PO TID PRN 11/28/18 [History]


SUMAtriptan [Imitrex] 25 mg PO ASDIRECTED PRN 11/28/18 [History]


DULoxetine HCl [Cymbalta] 60 mg PO BID 01/05/19 [History]


Baclofen 10 mg PO TID 03/21/19 [History]


Ciprofloxacin HCl [Cipro] 500 mg PO BID 03/21/19 [History]


Ubidecarenone [Co Q-10] 100 mg PO DAILY 03/21/19 [History]


Zolpidem Tartrate [Zolpidem Tartrate ER] 12.5 mg PO QPM 03/21/19 [History]


rOPINIRole [Requip] 5 mg PO DAILY 03/21/19 [History]








Patient Handouts:  Ciprofloxacin tablets


Forms:  ED Department Discharge


Referrals: 


PCP,None [Primary Care Provider] - 





- Discharge Summary/Plan Comment


DC Time >30 min.: Yes


Discharge Summary/Plan Comment: 





Lengthy discussion of medications. We discussed stopping ambien, Bupropion, 

baclofen and tizanidine at this time. Discussed f/u in clinic in 1 week for 

recheck and evaluation. Counseled on close monitoring and management and f/u. 





- Patient Data


Vitals - Most Recent: 


 Last Vital Signs











Temp  36.0 C   03/21/19 14:00


 


Pulse  98   03/21/19 14:00


 


Resp  20   03/21/19 14:00


 


BP  138/62   03/21/19 14:00


 


Pulse Ox  98   03/21/19 14:00











Weight - Most Recent: 110.677 kg


Lab Results - Last 24 hrs: 


 Laboratory Results - last 24 hr











  03/21/19 03/21/19 03/21/19 Range/Units





  04:41 05:30 05:30 


 


WBC   7.5  D   (4.0-11.0)  K/uL


 


RBC   4.67   (3.80-5.80)  M/uL


 


Hgb   13.0   (11.5-16.5)  g/dL


 


Hct   39.5   (37.0-47.0)  %


 


MCV   85   (76-96)  fL


 


MCH   27.8   (27.0-32.0)  pg


 


MCHC   32.9   (31.0-35.0)  g/dL


 


RDW   14.1   (11.0-16.0)  %


 


Plt Count   165   (150-500)  K/uL


 


MPV   10.8 H   (6.0-10.0)  fL


 


Neut % (Auto)   81.1 H   (45.0-70.0)  %


 


Lymph % (Auto)   12.4 L   (20.0-40.0)  %


 


Mono % (Auto)   5.3   (3.0-10.0)  %


 


Eos % (Auto)   0.9 L   (1.0-5.0)  %


 


Baso % (Auto)   0.3   (0.0-0.5)  %


 


Neut # (Auto)   6.09   (2.00-7.50)  K/uL


 


Lymph # (Auto)   0.93 L   (1.50-4.00)  K/uL


 


Mono # (Auto)   0.40   (0.20-0.80)  K/uL


 


Eos # (Auto)   0.07   (0.04-0.40)  K/uL


 


Baso # (Auto)   0.02   (0.02-0.10)  K/uL


 


Sodium    144  (136-145)  mmol/L


 


Potassium    3.7  (3.5-5.1)  mmol/L


 


Chloride    107  ()  mmol/L


 


Carbon Dioxide    22.5  (21.0-32.0)  mmol/L


 


Anion Gap    18.2 H  (5.0-15.0)  mmol/L


 


BUN    11  (8-26)  mg/dL


 


Creatinine    1.02  (0.55-1.02)  mg/dL


 


Est Cr Clr Drug Dosing    TNP  


 


Estimated GFR (MDRD)    56 L  (>60)  MLS/MIN


 


BUN/Creatinine Ratio    10.8  (6-25)  


 


Glucose    150 H D  ()  mg/dL


 


POC Glucose  136 H    ()  mg/dL


 


Calcium    8.8  (8.5-10.1)  mg/dL


 


Total Bilirubin    0.5  D  (0.0-1.0)  mg/dL


 


AST    51 H  (15-37)  U/L


 


ALT    37  (12-78)  U/L


 


Alkaline Phosphatase    91  ()  U/L


 


Total Protein    7.4  (6.4-8.2)  g/dL


 


Albumin    3.6  (3.4-5.0)  g/dL


 


Globulin    3.8  (2.2-4.2)  g/dL


 


Albumin/Globulin Ratio    0.9  (0.8-2.0)  


 


TSH, Ultra Sensitive    2.296  (0.358-3.740)  uIU/mL


 


Urine Color     


 


Urine Appearance     (CLEAR)  


 


Urine pH     (5.0-8.0)  


 


Ur Specific Gravity     (1.003-1.030)  


 


Urine Protein     (NEGATIVE)  mg/dL


 


Urine Glucose (UA)     (NEGATIVE)  mg/dL


 


Urine Ketones     (NEGATIVE)  mg/dL


 


Urine Occult Blood     (NEGATIVE)  


 


Urine Nitrite     (NEGATIVE)  


 


Urine Bilirubin     (NEGATIVE)  


 


Urine Urobilinogen     (0.2-1.0)  E.U./dL


 


Ur Leukocyte Esterase     (NEGATIVE)  


 


Urine RBC     /HPF


 


Urine WBC     /HPF


 


Urine WBC Clumps     /HPF


 


Ur Squamous Epith Cells     /HPF


 


Urine Bacteria     /HPF


 


Urine Opiates Screen     (NEGATIVE)  


 


Ur Oxycodone Screen     (NEGATIVE)  


 


Urine Methadone Screen     (NEGATIVE)  


 


Ur Barbiturates Screen     (NEGATIVE)  


 


Ur Tricyclics Screen     (NEGATIVE)  


 


Ur Phencyclidine Scrn     (NEGATIVE)  


 


Ur Amphetamine Screen     (NEGATIVE)  


 


Urine MDMA Screen     (NEGATIVE)  


 


U Benzodiazepines Scrn     (NEGATIVE)  


 


U Cocaine Metab Screen     (NEGATIVE)  


 


U Marijuana (THC) Screen     (NEGATIVE)  














  03/21/19 03/21/19 03/21/19 Range/Units





  05:45 05:45 07:26 


 


WBC     (4.0-11.0)  K/uL


 


RBC     (3.80-5.80)  M/uL


 


Hgb     (11.5-16.5)  g/dL


 


Hct     (37.0-47.0)  %


 


MCV     (76-96)  fL


 


MCH     (27.0-32.0)  pg


 


MCHC     (31.0-35.0)  g/dL


 


RDW     (11.0-16.0)  %


 


Plt Count     (150-500)  K/uL


 


MPV     (6.0-10.0)  fL


 


Neut % (Auto)     (45.0-70.0)  %


 


Lymph % (Auto)     (20.0-40.0)  %


 


Mono % (Auto)     (3.0-10.0)  %


 


Eos % (Auto)     (1.0-5.0)  %


 


Baso % (Auto)     (0.0-0.5)  %


 


Neut # (Auto)     (2.00-7.50)  K/uL


 


Lymph # (Auto)     (1.50-4.00)  K/uL


 


Mono # (Auto)     (0.20-0.80)  K/uL


 


Eos # (Auto)     (0.04-0.40)  K/uL


 


Baso # (Auto)     (0.02-0.10)  K/uL


 


Sodium     (136-145)  mmol/L


 


Potassium     (3.5-5.1)  mmol/L


 


Chloride     ()  mmol/L


 


Carbon Dioxide     (21.0-32.0)  mmol/L


 


Anion Gap     (5.0-15.0)  mmol/L


 


BUN     (8-26)  mg/dL


 


Creatinine     (0.55-1.02)  mg/dL


 


Est Cr Clr Drug Dosing     


 


Estimated GFR (MDRD)     (>60)  MLS/MIN


 


BUN/Creatinine Ratio     (6-25)  


 


Glucose     ()  mg/dL


 


POC Glucose    134 H  ()  mg/dL


 


Calcium     (8.5-10.1)  mg/dL


 


Total Bilirubin     (0.0-1.0)  mg/dL


 


AST     (15-37)  U/L


 


ALT     (12-78)  U/L


 


Alkaline Phosphatase     ()  U/L


 


Total Protein     (6.4-8.2)  g/dL


 


Albumin     (3.4-5.0)  g/dL


 


Globulin     (2.2-4.2)  g/dL


 


Albumin/Globulin Ratio     (0.8-2.0)  


 


TSH, Ultra Sensitive     (0.358-3.740)  uIU/mL


 


Urine Color   Yellow   


 


Urine Appearance   Cloudy   (CLEAR)  


 


Urine pH   5.5   (5.0-8.0)  


 


Ur Specific Gravity   1.020   (1.003-1.030)  


 


Urine Protein   100 H   (NEGATIVE)  mg/dL


 


Urine Glucose (UA)   Negative   (NEGATIVE)  mg/dL


 


Urine Ketones   Negative   (NEGATIVE)  mg/dL


 


Urine Occult Blood   Moderate H   (NEGATIVE)  


 


Urine Nitrite   Positive H   (NEGATIVE)  


 


Urine Bilirubin   Negative   (NEGATIVE)  


 


Urine Urobilinogen   0.2   (0.2-1.0)  E.U./dL


 


Ur Leukocyte Esterase   Large H   (NEGATIVE)  


 


Urine RBC   10-20 H   /HPF


 


Urine WBC   Packed H   /HPF


 


Urine WBC Clumps   Few   /HPF


 


Ur Squamous Epith Cells   Not seen   /HPF


 


Urine Bacteria   Moderate H   /HPF


 


Urine Opiates Screen  Negative    (NEGATIVE)  


 


Ur Oxycodone Screen  Negative    (NEGATIVE)  


 


Urine Methadone Screen  Negative    (NEGATIVE)  


 


Ur Barbiturates Screen  Negative    (NEGATIVE)  


 


Ur Tricyclics Screen  Positive H    (NEGATIVE)  


 


Ur Phencyclidine Scrn  Negative    (NEGATIVE)  


 


Ur Amphetamine Screen  Negative    (NEGATIVE)  


 


Urine MDMA Screen  Negative    (NEGATIVE)  


 


U Benzodiazepines Scrn  Negative    (NEGATIVE)  


 


U Cocaine Metab Screen  Negative    (NEGATIVE)  


 


U Marijuana (THC) Screen  Positive H    (NEGATIVE)  











Med Orders - Current: 


 Current Medications





Sodium Chloride (Saline Flush)  10 ml FLUSH ASDIRECTED PRN


   PRN Reason: Keep Vein Open

## 2019-07-12 ENCOUNTER — HOSPITAL ENCOUNTER (OUTPATIENT)
Dept: HOSPITAL 60 - LB.SDS | Age: 54
Discharge: HOME | End: 2019-07-12
Attending: SURGERY
Payer: MEDICARE

## 2019-07-12 VITALS — HEART RATE: 81 BPM | SYSTOLIC BLOOD PRESSURE: 157 MMHG | DIASTOLIC BLOOD PRESSURE: 70 MMHG

## 2019-07-12 DIAGNOSIS — R13.10: Primary | ICD-10-CM

## 2019-07-12 DIAGNOSIS — J44.9: ICD-10-CM

## 2019-07-12 PROCEDURE — 43235 EGD DIAGNOSTIC BRUSH WASH: CPT

## 2019-07-12 PROCEDURE — 82962 GLUCOSE BLOOD TEST: CPT

## 2019-07-12 NOTE — OR
DATE OF OPERATION:  07/12/2019

 

PREOPERATIVE DIAGNOSIS:  Dysphagia.

 

POSTOPERATIVE DIAGNOSIS:  Dysphagia.

 

PROCEDURE:  EGD.

 

ANESTHESIA:  MAC.

 

ESTIMATED BLOOD LOSS:  None.

 

COMPLICATIONS:  None.

 

INDICATION FOR THE PROCEDURE:  The patient is a 54-year-old female who has had a history of

dysphagia in the past, approximately 10 to 12 years ago, had previous EGD with dilatation,

now has intermittent issues with swallowing and feels like things getting stuck at her

distal esophagus and has been previously told she had a hiatal hernia as well.  Otherwise,

no complaints of abdominal pain.

 

DESCRIPTION OF PROCEDURE:  Informed consent was obtained from the patient.  The patient was

taken to the operating room and placed on the table in left lateral decubitus position.

Monitored anesthesia care was administered.  The esophagogastroscope was advanced through

the oral cavity and directed toward the second portion of the duodenum.  Duodenum appeared

normal.  The gastric antrum appeared normal.  Retroflexion performed in the stomach which

was also otherwise unremarkable.  No obvious hiatal hernia present.  The scope was then

withdrawn within the esophagus.  No obvious hiatal hernia.  No strictures at the GE

junction.  Otherwise, no other masses or obvious inflammation in the distal esophagus from

reflux.

 

FINDINGS:  Normal EGD.

 

RECOMMENDATIONS:  No anatomic reasons for her dysphagia.  Would recommend a swallowing

esophagram to further evaluate any physiologic abnormalities with the esophagus.

 

 

MN/RENAY

DD:  07/12/2019 11:18:34

DT:  07/12/2019 14:33:08

Job #:  [id number]

## 2019-12-17 ENCOUNTER — HOSPITAL ENCOUNTER (OUTPATIENT)
Dept: HOSPITAL 60 - LB.BH | Age: 54
End: 2019-12-17
Attending: PSYCHOLOGIST
Payer: MEDICARE

## 2019-12-17 DIAGNOSIS — F41.1: ICD-10-CM

## 2019-12-17 DIAGNOSIS — F31.81: Primary | ICD-10-CM

## 2019-12-17 DIAGNOSIS — F43.10: ICD-10-CM

## 2020-02-22 ENCOUNTER — HOSPITAL ENCOUNTER (EMERGENCY)
Dept: HOSPITAL 60 - LB.ED | Age: 55
Discharge: HOME | End: 2020-02-22
Payer: MEDICARE

## 2020-02-22 VITALS — SYSTOLIC BLOOD PRESSURE: 143 MMHG | HEART RATE: 102 BPM | DIASTOLIC BLOOD PRESSURE: 81 MMHG

## 2020-02-22 DIAGNOSIS — E66.9: ICD-10-CM

## 2020-02-22 DIAGNOSIS — Z91.09: ICD-10-CM

## 2020-02-22 DIAGNOSIS — G43.909: Primary | ICD-10-CM

## 2020-02-22 DIAGNOSIS — Z88.1: ICD-10-CM

## 2020-02-22 DIAGNOSIS — K21.9: ICD-10-CM

## 2020-02-22 DIAGNOSIS — F32.9: ICD-10-CM

## 2020-02-22 DIAGNOSIS — Z79.899: ICD-10-CM

## 2020-02-22 DIAGNOSIS — E11.9: ICD-10-CM

## 2020-02-22 DIAGNOSIS — F41.9: ICD-10-CM

## 2020-02-22 DIAGNOSIS — J45.909: ICD-10-CM

## 2020-02-22 RX ADMIN — PROCHLORPERAZINE EDISYLATE ONE: 5 INJECTION INTRAMUSCULAR; INTRAVENOUS at 16:16

## 2020-02-22 RX ADMIN — KETOROLAC TROMETHAMINE ONE MG: 60 INJECTION, SOLUTION INTRAMUSCULAR at 16:04

## 2020-02-22 RX ADMIN — KETOROLAC TROMETHAMINE ONE: 30 INJECTION, SOLUTION INTRAMUSCULAR at 16:12

## 2020-02-22 RX ADMIN — DEXAMETHASONE SODIUM PHOSPHATE ONE: 4 INJECTION, SOLUTION INTRAMUSCULAR; INTRAVENOUS at 16:11

## 2020-02-22 RX ADMIN — DIPHENHYDRAMINE HYDROCHLORIDE ONE MG: 50 INJECTION, SOLUTION INTRAMUSCULAR; INTRAVENOUS at 15:50

## 2020-02-22 RX ADMIN — HYDROMORPHONE HYDROCHLORIDE ONE MG: 2 INJECTION INTRAMUSCULAR; INTRAVENOUS; SUBCUTANEOUS at 16:30

## 2020-02-22 RX ADMIN — DIPHENHYDRAMINE HYDROCHLORIDE ONE: 50 INJECTION, SOLUTION INTRAMUSCULAR; INTRAVENOUS at 16:15

## 2020-02-22 RX ADMIN — HYDROMORPHONE HYDROCHLORIDE ONE: 2 INJECTION INTRAMUSCULAR; INTRAVENOUS; SUBCUTANEOUS at 16:09

## 2020-02-22 RX ADMIN — DEXAMETHASONE SODIUM PHOSPHATE ONE MG: 4 INJECTION, SOLUTION INTRAMUSCULAR; INTRAVENOUS at 16:00

## 2020-02-22 RX ADMIN — PROCHLORPERAZINE EDISYLATE ONE MLS/HR: 5 INJECTION INTRAMUSCULAR; INTRAVENOUS at 16:07

## 2020-02-22 NOTE — EDM.PDOC
ED HPI GENERAL MEDICAL PROBLEM





- General


Chief Complaint: Headache


Stated Complaint: Migraine


Time Seen by Provider: 20 15:15


Source of Information: Reports: Patient


History Limitations: Reports: No Limitations





- History of Present Illness


INITIAL COMMENTS - FREE TEXT/NARRATIVE: 





Beth continues with her usual migraine.  Initially given toradol and zofran 

in the clinic.  Seen back yesterday, and toradol repeated with additional 

injection of phenergan.  Again, she recalls gradual onset and typical location, 

quality, and usual associated features of photophobia and nausea.  Chart 

reviewed, and multiple presentations noted.  She feels dehydrated.  States she 

hardly slept at all.  Has had negative imaging in the past.  No focal neuro 

deficits or vision changes.  No rash or fever.  


  ** Headache


Pain Score (Numeric/FACES): 8





- Related Data


 Allergies











Allergy/AdvReac Type Severity Reaction Status Date / Time


 


adhesive tape Allergy  Rash Verified 20 17:31


 


celecoxib [From Celebrex] Allergy  Nausea and Verified 20 17:31





   Vomiting  


 


ketamine AdvReac Intermediate Agitation Verified 20 17:31











Home Meds: 


 Home Meds





Omeprazole 20 mg PO ACBREAKFAST 13 [History]


risperiDONE [RisperiDAL M-Tab] 5 mg PO QPM 13 [History]


Acetaminophen [Tylenol Extra Strength] 500 mg PO Q6H 11/21/15 [History]


QUEtiapine [SEROquel] 1 tab PO QAM 11/21/15 [History]


Spironolactone [Aldactone] 3 tab PO BID 11/21/15 [History]


QUEtiapine Fumarate [Quetiapine Fumarate] 400 mg PO QPM 12/10/17 [History]


Calcium Carbonate/Vitamin D3 [Calcium 600 + D3 Softgel] 1 tab PO BID 18 [

History]


Ubidecarenone [Co Q-10] 2 tab PO DAILY 19 [History]


rOPINIRole [Requip] 2.5 mg PO DAILY 19 [History]


Meloxicam 0.5 tab PO BID 19 [History]


Mirtazapine [Remeron] 1 - 2 tab PO BEDTIME 19 [History]











Past Medical History





- Past Health History


Medical/Surgical History: Denies Medical/Surgical History


HEENT History: Reports: Impaired Vision, Other (See Below)


Other HEENT History: glasses for reading,


Cardiovascular History: Reports: Hypertension


Respiratory History: Reports: Asthma, Bronchitis, Recurrent, Pneumonia, 

Recurrent, Other (See Below)


Other Respiratory History: Emphysema


Gastrointestinal History: Reports: Bowel Obstruction, GERD, Irritable Bowel 

Syndrome


Genitourinary History: Reports: None


OB/GYN History: Reports: Dysfunctional Uterine Bleeding, Pregnancy


Musculoskeletal History: Reports: Back Pain, Chronic


Other Musculoskeletal History: back / butt pain, shoots down left leg


Neurological History: Reports: Migraines, Neuropathy, Peripheral


Psychiatric History: Reports: Anxiety, Depression, Suicide Attempt, Suicidal 

Ideation


Endocrine/Metabolic History: Reports: Diabetes, Type II, Obesity/BMI 30+


Hematologic History: Reports: Blood Transfusion(s), Iron Deficiency


Dermatologic History: Reports: Eczema, Other (See Below)


Other Dermatologic History: Allergy to paper tape





- Infectious Disease History


Infectious Disease History: Reports: Chicken Pox, Measles, Rubella





- Past Surgical History


GI Surgical History: Reports: None


Female  Surgical History: Reports:  Section, Hysterectomy


Musculoskeletal Surgical History: Reports: None





Social & Family History





- Family History


Family Medical History: Noncontributory


Cardiac: Reports: None


Musculoskeletal: Reports: Arthritis, Back pain, Chronic


Neurological: Reports: None


Psychiatric: Reports: Depression


Endocrine/Metabolic: Reports: None


Oncologic: Reports: None





- Caffeine Use


Caffeine Use: Reports: Soda


Other Caffeine Use: 2 cups a day





ED ROS GENERAL





- Review of Systems


Review Of Systems: Comprehensive ROS is negative, except as noted in HPI.





- Physical Exam


Exam: See Below


Exam Limited By: No Limitations


General Appearance: Alert, WD/WN, No Apparent Distress, Anxious


Ears: Normal External Exam


Nose: Normal Inspection, Normal Mucosa


Throat/Mouth: Normal Voice, Other (looks on the dry side)


Head Exam: Atraumatic, Normocephalic


Neck: Normal Inspection, Supple, Non-Tender, Full Range of Motion


Respiratory/Chest: No Respiratory Distress, Lungs Clear, Normal Breath Sounds


Cardiovascular: Regular Rate, Rhythm, No Edema, No Gallop, No Murmur, No Rub


GI/Abdominal: Normal Bowel Sounds, Soft, Non-Tender


Neuro Exam (Abbreviated): Alert, Oriented, CN II-XII Intact, Normal Cognition, 

Normal Gait, No Motor/Sensory Deficits


Back Exam: Normal Inspection


Extremities: Normal Inspection, Normal Range of Motion, Normal Capillary Refill


Psychiatric: Anxious


Skin Exam: Warm, Dry


Comments: 





slight tachycardia noted- c/w anxiety concurrent with dehydration





Course





- Vital Signs


Text/Narrative:: 





provided cocktail of migraine meds with significant benefit


along with a liter of fluid 


Last Recorded V/S: 


 Last Vital Signs











Temp  98.3 F   20 15:18


 


Pulse  102 H  20 15:18


 


Resp  20   20 15:18


 


BP  143/81 H  20 15:18


 


Pulse Ox  99   20 15:18














- Orders/Labs/Meds


Meds: 


Medications














Discontinued Medications














Generic Name Dose Route Start Last Admin





  Trade Name Maite  PRN Reason Stop Dose Admin


 


Dexamethasone  4 mg  20 15:23  20 16:00





  Dexamethasone  IVPUSH  20 15:24  4 mg





  ONETIME ONE   Administration





     





     





     





     


 


Dexamethasone  Confirm  20 15:57  20 16:11





  Dexamethasone  Administered  20 15:58  Not Given





  Dose   





  4 mg   





  .ROUTE   





  .STK-MED ONE   





     





     





     





     


 


Diazepam  Confirm  20 15:11  





  Valium  Administered  20 15:12  





  Dose   





  5 mg   





  .ROUTE   





  .STK-MED ONE   





     





     





     





     


 


Diphenhydramine HCl  50 mg  20 15:23  20 15:50





  Benadryl  IVPUSH  20 15:24  50 mg





  ONETIME ONE   Administration





     





     





     





     


 


Diphenhydramine HCl  Confirm  20 15:57  20 16:15





  Benadryl  Administered  20 15:58  Not Given





  Dose   





  50 mg   





  .ROUTE   





  .STK-MED ONE   





     





     





     





     


 


Hydromorphone HCl  0.5 mg  20 15:24  20 15:55





  Dilaudid  IVPUSH  20 15:25  0.5 mg





  ONETIME ONE   Administration





     





     





     





     


 


Hydromorphone HCl  Confirm  20 16:02  20 16:09





  Dilaudid  Administered  20 16:03  Not Given





  Dose   





  2 mg   





  .ROUTE   





  .STK-MED ONE   





     





     





     





     


 


Hydromorphone HCl  1 mg  20 16:22  20 16:30





  Dilaudid  IVPUSH  20 16:23  1 mg





  ONETIME ONE   Administration





     





     





     





     


 


Hydromorphone HCl  0.5 mg  20 16:35  20 16:54





  Dilaudid  IVPUSH   0.5 mg





  ONETIME PRN   Administration





  if not pain-free   





     





     





     


 


Prochlorperazine Edisylate 5  51 mls @ 150 mls/hr  20 15:22  20 16:

07





  mg/ Sodium Chloride  IV  20 15:42  150 mls/hr





  ONETIME ONE   Administration





     





     





     





     


 


Sodium Chloride  1,000 mls @ 999 mls/hr  20 15:20  20 15:46





  Normal Saline  IV  20 16:20  999 mls/hr





  .BOLUS ONE   Administration





     





     





     





     


 


Ketorolac Tromethamine  10 mg  20 15:24  20 16:04





  Toradol  IVPUSH  20 15:25  10 mg





  ONETIME ONE   Administration





     





     





     





     


 


Ketorolac Tromethamine  Confirm  20 16:01  20 16:12





  Toradol  Administered  20 16:02  Not Given





  Dose   





  30 mg   





  .ROUTE   





  .STK-MED ONE   





     





     





     





     


 


Lorazepam  Confirm  20 16:25  





  Ativan  Administered  20 16:26  





  Dose   





  2 mg   





  .ROUTE   





  .STK-MED ONE   





     





     





     





     


 


Lorazepam  Confirm  20 21:13  





  Ativan  Administered  20 21:14  





  Dose   





  2 mg   





  .ROUTE   





  .STK-MED ONE   





     





     





     





     


 


Prochlorperazine Edisylate  Confirm  20 16:01  20 16:16





  Compazine  Administered  20 16:02  Not Given





  Dose   





  10 mg   





  .ROUTE   





  .STK-MED ONE   





     





     





     





     


 


Sumatriptan Succinate  Confirm  20 13:45  





  Imitrex  Administered  20 13:46  





  Dose   





  6 mg   





  .ROUTE   





  .STK-MED ONE   





     





     





     





     














Departure





- Departure


Time of Disposition: 16:40


Disposition: Home, Self-Care 01


Condition: Good


Clinical Impression: 


 Migraine








- Discharge Information


*PRESCRIPTION DRUG MONITORING PROGRAM REVIEWED*: Not Applicable


*COPY OF PRESCRIPTION DRUG MONITORING REPORT IN PATIENT JAE: Not Applicable


Instructions:  Migraine Headache, Easy-to-Read, Topiramate tablets


Referrals: 


PCP,None [Primary Care Provider] - 


Forms:  ED Department Discharge


Additional Instructions: 


Take Topamax 2mg BID for migraines


Follow up with primary MD if needed


Rest and drink plenty of fluids





Sepsis Event Note





- Evaluation


Sepsis Screening Result: No Definite Risk





- Focused Exam


Date Exam was Performed: 20


Time Exam was Performed: 09:39

## 2020-02-23 ENCOUNTER — HOSPITAL ENCOUNTER (EMERGENCY)
Dept: HOSPITAL 60 - LB.ED | Age: 55
Discharge: HOME | End: 2020-02-23
Payer: MEDICARE

## 2020-02-23 VITALS — DIASTOLIC BLOOD PRESSURE: 94 MMHG | HEART RATE: 100 BPM | SYSTOLIC BLOOD PRESSURE: 151 MMHG

## 2020-02-23 DIAGNOSIS — I10: ICD-10-CM

## 2020-02-23 DIAGNOSIS — F41.9: ICD-10-CM

## 2020-02-23 DIAGNOSIS — E66.9: ICD-10-CM

## 2020-02-23 DIAGNOSIS — Z88.8: ICD-10-CM

## 2020-02-23 DIAGNOSIS — J45.909: ICD-10-CM

## 2020-02-23 DIAGNOSIS — Z91.048: ICD-10-CM

## 2020-02-23 DIAGNOSIS — E11.9: ICD-10-CM

## 2020-02-23 DIAGNOSIS — F32.9: ICD-10-CM

## 2020-02-23 DIAGNOSIS — R51: Primary | ICD-10-CM

## 2020-02-23 DIAGNOSIS — K21.9: ICD-10-CM

## 2020-02-23 DIAGNOSIS — Z79.899: ICD-10-CM

## 2020-02-23 DIAGNOSIS — T50.905A: ICD-10-CM

## 2020-02-23 PROCEDURE — 85651 RBC SED RATE NONAUTOMATED: CPT

## 2020-02-23 PROCEDURE — 82306 VITAMIN D 25 HYDROXY: CPT

## 2020-02-23 PROCEDURE — 96375 TX/PRO/DX INJ NEW DRUG ADDON: CPT

## 2020-02-23 PROCEDURE — 83735 ASSAY OF MAGNESIUM: CPT

## 2020-02-23 PROCEDURE — 96361 HYDRATE IV INFUSION ADD-ON: CPT

## 2020-02-23 PROCEDURE — 74019 RADEX ABDOMEN 2 VIEWS: CPT

## 2020-02-23 PROCEDURE — 96372 THER/PROPH/DIAG INJ SC/IM: CPT

## 2020-02-23 PROCEDURE — 36415 COLL VENOUS BLD VENIPUNCTURE: CPT

## 2020-02-23 PROCEDURE — 84484 ASSAY OF TROPONIN QUANT: CPT

## 2020-02-23 PROCEDURE — 86140 C-REACTIVE PROTEIN: CPT

## 2020-02-23 PROCEDURE — 87086 URINE CULTURE/COLONY COUNT: CPT

## 2020-02-23 PROCEDURE — 80307 DRUG TEST PRSMV CHEM ANLYZR: CPT

## 2020-02-23 PROCEDURE — 83880 ASSAY OF NATRIURETIC PEPTIDE: CPT

## 2020-02-23 PROCEDURE — 85025 COMPLETE CBC W/AUTO DIFF WBC: CPT

## 2020-02-23 PROCEDURE — 96376 TX/PRO/DX INJ SAME DRUG ADON: CPT

## 2020-02-23 PROCEDURE — 80053 COMPREHEN METABOLIC PANEL: CPT

## 2020-02-23 PROCEDURE — 70450 CT HEAD/BRAIN W/O DYE: CPT

## 2020-02-23 PROCEDURE — 81001 URINALYSIS AUTO W/SCOPE: CPT

## 2020-02-23 PROCEDURE — 99285 EMERGENCY DEPT VISIT HI MDM: CPT

## 2020-02-23 PROCEDURE — 71046 X-RAY EXAM CHEST 2 VIEWS: CPT

## 2020-02-23 PROCEDURE — 96365 THER/PROPH/DIAG IV INF INIT: CPT

## 2020-02-23 PROCEDURE — 82150 ASSAY OF AMYLASE: CPT

## 2020-02-23 RX ADMIN — LORAZEPAM ONE MG: 2 INJECTION INTRAMUSCULAR; INTRAVENOUS at 15:40

## 2020-02-23 RX ADMIN — LORAZEPAM ONE MG: 2 INJECTION INTRAMUSCULAR; INTRAVENOUS at 15:50

## 2020-02-23 NOTE — CR
Date of Service:  02/23/20

Clinical Data:  Cough



PA AND LATERAL CHEST:  



Comparison is made to a prior exam dated 03/05/18. 



The heart size is normal.  The lungs are clear.  No pneumothorax.  No pleural 
effusions.  



No evidence of acute intrathoracic disease.  



[id number]
[facility]

## 2020-02-23 NOTE — CT
Date of Service:  02/23/20

Clinical Data:  refractory migraine



UNENHANCED BRAIN CT:  



Multislice axial acquisition was performed.  



No priors  



There is diffuse cerebral atrophy.  There are periventricular lucencies 
bilaterally consistent with small vessel ischemic change.  



No masses or mass effect.  No intracranial hemorrhage.  No evidence of acute or 
subacute infarct.  



No osseous abnormalities.  



IMPRESSION:  No acute intracranial intracranial abnormalities.  



[id number]
[facility]

## 2020-02-23 NOTE — CR
Date of Service:  02/23/20

Clinical Data:  Nausea/IBS







There is gas and stool throughout the colon.  There are multiple gas-filled 
loops of small bowel in the lower abdomen and pelvis.  They are not distended.  
They do, however, contain multiple air-fluid levels.  Enteritis, localized ileus
, or developing obstructive process should be considered.  Followup imaging is 
recommended if clinically indicated.  



No free air.  



There are surgical clips in the right upper quadrant.  



[id number]
[facility]

## 2020-02-23 NOTE — EDM.PDOC
ED HPI GENERAL MEDICAL PROBLEM





- General


Chief Complaint: General


Stated Complaint: Headache


Time Seen by Provider: 20 13:00


Source of Information: Reports: Patient


History Limitations: Reports: No Limitations





- History of Present Illness


INITIAL COMMENTS - FREE TEXT/NARRATIVE: 





Beth returns for her 4th visit this week.  She failed rather aggressive 

migraine protocol yesterday, but was able to get some more sleep than any day 

this week being a total of 5-6 hours.  She was lucid after her discharge 

according to her son, and seemed unaffected despite an abundant regimen.  H/A 

pain abated somewhat as she was able to sleep after returning home.  She 

believes it was moderate until this morning.  No provoking factor noted.  She 

did not take imitrex for a few days.  She called to see if she should come back 

and was appropriately triaged to return.  Normal bm this am.  She feels very 

nauseated.  Ongoing abdominal pain attributed to IBS flare.  Denies gi/gu sx's 

otherwise.  Recalls failing one sleep study, and going on to pass another.  She 

denies orthopnea, eating recent large meals, chest pain, or exertional 

component of her sx's.  No neck pain or occipital spasm, although some forehead 

tension.  Beth had been sneezing frequently over the past week and 

attributed this to year-round allergies.  Does have GERD sx's.  She has had a 

complicated history of depression and anxiety, and will be seeing her mental 

health provider later this week.  She is frustrated with the chronicity of her 

headache and finds it intolerable to the point she would accept being observed 

in the hospital.


  ** Headache


Pain Score (Numeric/FACES): 9





- Related Data


 Allergies











Allergy/AdvReac Type Severity Reaction Status Date / Time


 


adhesive tape Allergy  Rash Verified 20 17:31


 


celecoxib [From Celebrex] Allergy  Nausea and Verified 20 17:31





   Vomiting  


 


ketamine AdvReac Intermediate Agitation Verified 20 17:31











Home Meds: 


 Home Meds





Omeprazole 20 mg PO ACBREAKFAST 13 [History]


risperiDONE [RisperiDAL M-Tab] 5 mg PO QPM 13 [History]


Acetaminophen [Tylenol Extra Strength] 500 mg PO Q6H 11/21/15 [History]


QUEtiapine [SEROquel] 1 tab PO QAM 11/21/15 [History]


Spironolactone [Aldactone] 3 tab PO BID 11/21/15 [History]


QUEtiapine Fumarate [Quetiapine Fumarate] 400 mg PO QPM 12/10/17 [History]


Calcium Carbonate/Vitamin D3 [Calcium 600 + D3 Softgel] 1 tab PO BID 18 [

History]


Ubidecarenone [Co Q-10] 2 tab PO DAILY 19 [History]


rOPINIRole [Requip] 2.5 mg PO DAILY 19 [History]


Meloxicam 0.5 tab PO BID 19 [History]


Mirtazapine [Remeron] 1 - 2 tab PO BEDTIME 19 [History]











Past Medical History





- Past Health History


Medical/Surgical History: Denies Medical/Surgical History


HEENT History: Reports: Impaired Vision, Other (See Below)


Other HEENT History: glasses for reading,


Cardiovascular History: Reports: Hypertension


Respiratory History: Reports: Asthma, Bronchitis, Recurrent, Pneumonia, 

Recurrent, Other (See Below)


Other Respiratory History: Emphysema


Gastrointestinal History: Reports: Bowel Obstruction, GERD, Irritable Bowel 

Syndrome


Genitourinary History: Reports: None


OB/GYN History: Reports: Dysfunctional Uterine Bleeding, Pregnancy


Musculoskeletal History: Reports: Back Pain, Chronic


Other Musculoskeletal History: back / butt pain, shoots down left leg


Neurological History: Reports: Migraines, Neuropathy, Peripheral


Psychiatric History: Reports: Anxiety, Depression, Suicide Attempt, Suicidal 

Ideation


Endocrine/Metabolic History: Reports: Diabetes, Type II, Obesity/BMI 30+


Hematologic History: Reports: Blood Transfusion(s), Iron Deficiency


Dermatologic History: Reports: Eczema, Other (See Below)


Other Dermatologic History: Allergy to paper tape





- Infectious Disease History


Infectious Disease History: Reports: Chicken Pox, Measles, Rubella





- Past Surgical History


GI Surgical History: Reports: None


Female  Surgical History: Reports:  Section, Hysterectomy


Musculoskeletal Surgical History: Reports: None





Social & Family History





- Family History


Family Medical History: Noncontributory


Cardiac: Reports: None


Musculoskeletal: Reports: Arthritis, Back pain, Chronic


Neurological: Reports: None


Psychiatric: Reports: Depression


Endocrine/Metabolic: Reports: None


Oncologic: Reports: None





- Caffeine Use


Caffeine Use: Reports: Soda


Other Caffeine Use: 2 cups a day





ED ROS GENERAL





- Review of Systems


Review Of Systems: Comprehensive ROS is negative, except as noted in HPI.





ED EXAM, GENERAL





- Physical Exam


Exam: See Below


Exam Limited By: No Limitations


General Appearance: Alert, WD/WN, No Apparent Distress, Anxious


Ears: Normal External Exam, Hearing Grossly Normal


Nose: Normal Inspection, Normal Mucosa


Throat/Mouth: Normal Inspection, Normal Lips, Normal Oropharynx, Normal Voice


Head: Atraumatic, Normocephalic.  No: Facial Swelling, Facial Tenderness, Sinus 

Tenderness


Neck: Normal Inspection, Supple, Non-Tender, Full Range of Motion.  No: 

Lymphadenopathy (R), Lymphadenopathy (L), Tender Midline


Respiratory/Chest: No Respiratory Distress, Lungs Clear, Normal Breath Sounds


Cardiovascular: Regular Rate, Rhythm.  No: No Gallop, No Murmur, No Rub


GI/Abdominal: Normal Bowel Sounds, Soft, No Mass, Tender (midepigastrium to 

moderate palpation).  No: Rebound


Back Exam: Normal Inspection, Full Range of Motion.  No: CVA Tenderness (R), 

CVA Tenderness (L), Decreased Range of Motion, Muscle Spasm, Paraspinal 

Tenderness, Vertebral Tenderness


Extremities: Normal Inspection, Normal Range of Motion, Non-Tender, No Pedal 

Edema, Normal Capillary Refill


Neurological: Alert, Oriented, CN II-XII Intact, Normal Cognition, Normal Gait, 

No Motor/Sensory Deficits


Psychiatric: Anxious


Skin Exam: Warm, Dry, Intact, No Rash


Lymphatic: No Adenopathy





Course





- Vital Signs


Text/Narrative:: 


Although better hydrated than yesterday, she appeared to remain a rather 

difficult IV start and the patient actually seemed inclined to try other route 

first.





Decided together to expand our w/u to exclude other causes





Review of every ER visit in her chart reveals the only potential thing that may 

have helped is valium for her back spasm. 





Beth tolerated the trial of intranasal lidocaine 1% and actually sniffed 

1.5 mL, but no change in h/a





SubQ imitrex was well tolerated without any perceived systemic effect or 

improvement in her pain that she found to be severe.





Oral indocin with adequate loading dose given her weight and tolerance of 

higher dose toradol resulted in no change.





zofran ODT considered


vs


reglan as I don't see if this was trialed in the past; but her nausea had fully 

abated.





Discussed the option of low dose ketamine in detail, including potential 

benefits vs possible adverse reactions, etc for lack of a better idea.  We 

decided together it was worth a trial.  My RN easily accomplished IV access.  

The ketamine was slowly pushed, and Beth said she felt 'weird'.  She stated 

that her pain was completely gone, within 3 minutes of receiving the bolus.  

Unfortunately, she found the dysphoria to be quite scary, and essentially went 

on to panic.





As she tolerated valium in the past, a test dose was administered without any 

change.





She did have a bit of increased salivation, not amenable to suctioning, that 

prompted me to order benadryl (as robinul not an option).





Beth kept repeating that she couldn't breath.  She kept pushing away the 

nasal cannula, and had a similar sense of claustrophobia with a NRB mask.  





She started to have some upper airway noise, likely related to her crowded 

oropharynx and large tongue, and her lungs were repeatedly auscultated and 

remained free of rales or wheezes.  





In auscultating her, it was evident that she had increasing sinus tachycardia, 

and started to appear plethoric in regard to her face.





It was difficult to calm her with a reassuring gentle verbal redirection, and 

my RN astutely applied the pulse oximetry.





Although the initial saturations read in the 70's, I think it took a bit for 

the monitor to equilibrate to the low 80's.  We only had access to one O2 port, 

and tried switching back & forth between the NC and NRB.  We also repositioned 

here, and I manually provided a bit of a jaw thrust.  She remained very 

agitated and verbally stating her uncomfortable subjective experience as well 

as sonorous upper airway sounds.





Beth's sats gradually came up with doing the above to optimize her 

oxygenation.  End-tidal CO2 was considered, but ultimately I directed my RN to 

continue to push sedatives while we reoriented Beth.  She was able to speak 

throughout the sequence.  Upon decreasing the O2 flow, and with her pushing the 

delivery devices away, her sats started to decreased and seemed to plateau 

transiently in the mid to upper 80's.  





As she began to awaken, I offered her a neb, and we gave repeated moderate 

boluses of ativan given that valium did not seem to really help.  





Luckily she came around, although she remained very anxious and her tachycardia 

took longer than anticipated to resolve.  





At that point, her son arrived, and she asked for dilaudid and/or valium for 

her back pain.  I helped her reposition, and we ended up providing her with a 

couple grams of magnesium, with the hope that it would help with her anxiety, 

breathing, HR, and maybe even the paravertebral muscle spasm that seemed to be 

the pain generator upon examination.  She herself declined renewal of PT and 

felt her backache was from laying around for the last several days.





I did apologize to her repeatedly for her untoward panic response to the low-

dose ketamine, although she did actually seem thankful in terms of the success 

in abating her refractory migraine. 





Beth was advised to set up an echo due to her unexpected elevated BNP, and 

this was ordered on her behalf with courtesy copy to her primary MD.





As her physician just returned from leave and has a pretty full schedule, I 

explained I would be happy to see her back anytime, but re-iterated that 

opioids would only be used as a last resort due to the propensity for rebound (

and her fair dilaudid dose from the day prior did not result in adequate 

treatment anyway).





She was discharged in good condition.





I sincerely appreciate the adept bedside management of my RN in providing 

excellent care under the circumstances, as I had never seen an emergence 

reaction despite ongoing use of ketamine for almost two decades.  





Of note, review of the literature seems to offer the only other solution of 

giving MORE ketamine and likely intubating the patient, and this was felt to 

exceed the capabilities of our small hospital, and probably require emergent 

transfer to a larger center.  For this reason, I decided to leave this as a 

last resort, only if necessary to ensure the safety and wellbeing of our 

patient.








Last Recorded V/S: 


 Last Vital Signs











Temp  97.8 F   20 13:05


 


Pulse  100   20 17:39


 


Resp  18   20 17:39


 


BP  151/94 H  20 17:39


 


Pulse Ox  97   20 17:39














- Orders/Labs/Meds


Orders: 


 Active Orders 24 hr











 Category Date Time Status


 


 RT Aerosol Therapy [RC] ASDIRECTED Care  20 15:55 Active


 


 C-Spine [Cervical Spine wo Cont] [CT] Stat Exams  20 13:11 Stop Req


 


 CULTURE URINE [RM] Stat Lab  20 14:00 Received


 


 VITAMIN D, 25-HYDROXY Stat Lab  20 13:40 Received











Labs: 


 Laboratory Tests











  20 Range/Units





  13:40 13:40 13:40 


 


WBC  10.4  D    (4.0-11.0)  K/uL


 


RBC  4.59    (3.80-5.80)  M/uL


 


Hgb  11.7    (11.5-16.5)  g/dL


 


Hct  36.5 L    (37.0-47.0)  %


 


MCV  80    (76-96)  fL


 


MCH  25.5 L    (27.0-32.0)  pg


 


MCHC  32.1    (31.0-35.0)  g/dL


 


RDW  22.9 H    (11.0-16.0)  %


 


Plt Count  196  D    (150-500)  K/uL


 


MPV  11.5 H    (6.0-10.0)  fL


 


Neut % (Auto)  84.3 H    (45.0-70.0)  %


 


Lymph % (Auto)  11.6 L    (20.0-40.0)  %


 


Mono % (Auto)  3.6    (3.0-10.0)  %


 


Eos % (Auto)  0.1 L    (1.0-5.0)  %


 


Baso % (Auto)  0.4    (0.0-0.5)  %


 


Neut # (Auto)  8.78 H    (2.00-7.50)  K/uL


 


Lymph # (Auto)  1.21 L    (1.50-4.00)  K/uL


 


Mono # (Auto)  0.37    (0.20-0.80)  K/uL


 


Eos # (Auto)  0.01 L    (0.04-0.40)  K/uL


 


Baso # (Auto)  0.04    (0.02-0.10)  K/uL


 


ESR    4  (0-30)  mm/hr


 


Sodium   141   (136-145)  mmol/L


 


Potassium   4.6   (3.5-5.1)  mmol/L


 


Chloride   101   ()  mmol/L


 


Carbon Dioxide   24.8   (21.0-32.0)  mmol/L


 


Anion Gap   19.8 H   (5.0-15.0)  mmol/L


 


BUN   16  D   (8-26)  mg/dL


 


Creatinine   1.06 H   (0.55-1.02)  mg/dL


 


Est Cr Clr Drug Dosing   52.39   mL/min


 


Estimated GFR (MDRD)   54 L   (>60)  MLS/MIN


 


BUN/Creatinine Ratio   15.1   (6-25)  


 


Glucose   175 H   ()  mg/dL


 


Calcium   8.8   (8.5-10.1)  mg/dL


 


Magnesium     (1.8-2.4)  mg/dL


 


Total Bilirubin   0.5  D   (0.0-1.0)  mg/dL


 


AST   15   (15-37)  U/L


 


ALT   20   (12-78)  U/L


 


Alkaline Phosphatase   51   ()  U/L


 


Troponin I   < 0.017   (0.000-0.060)  ng/mL


 


C-Reactive Protein   2.3   (0.0-3.0)  mg/L


 


B-Natriuretic Peptide     (0-125)  pg/mL


 


Total Protein   6.9   (6.4-8.2)  g/dL


 


Albumin   3.7   (3.4-5.0)  g/dL


 


Globulin   3.2   (2.2-4.2)  g/dL


 


Albumin/Globulin Ratio   1.2   (0.8-2.0)  


 


Amylase     ()  U/L


 


Urine Color     


 


Urine Appearance     (CLEAR)  


 


Urine pH     (5.0-8.0)  


 


Ur Specific Gravity     (1.003-1.030)  


 


Urine Protein     (NEGATIVE)  mg/dL


 


Urine Glucose (UA)     (NEGATIVE)  mg/dL


 


Urine Ketones     (NEGATIVE)  mg/dL


 


Urine Occult Blood     (NEGATIVE)  


 


Urine Nitrite     (NEGATIVE)  


 


Urine Bilirubin     (NEGATIVE)  


 


Urine Urobilinogen     (0.2-1.0)  E.U./dL


 


Ur Leukocyte Esterase     (NEGATIVE)  


 


Urine RBC     /HPF


 


Urine WBC     /HPF


 


Ur Squamous Epith Cells     /HPF


 


Urine Bacteria     /HPF


 


Urine Opiates Screen     (NEGATIVE)  


 


Ur Oxycodone Screen     (NEGATIVE)  


 


Urine Methadone Screen     (NEGATIVE)  


 


Ur Barbiturates Screen     (NEGATIVE)  


 


Ur Tricyclics Screen     (NEGATIVE)  


 


Ur Phencyclidine Scrn     (NEGATIVE)  


 


Ur Amphetamine Screen     (NEGATIVE)  


 


U Methamphetamines Scrn     (NEGATIVE)  


 


Urine MDMA Screen     (NEGATIVE)  


 


U Benzodiazepines Scrn     (NEGATIVE)  


 


U Cocaine Metab Screen     (NEGATIVE)  


 


U Marijuana (THC) Screen     (NEGATIVE)  














  20 Range/Units





  13:40 13:40 13:40 


 


WBC     (4.0-11.0)  K/uL


 


RBC     (3.80-5.80)  M/uL


 


Hgb     (11.5-16.5)  g/dL


 


Hct     (37.0-47.0)  %


 


MCV     (76-96)  fL


 


MCH     (27.0-32.0)  pg


 


MCHC     (31.0-35.0)  g/dL


 


RDW     (11.0-16.0)  %


 


Plt Count     (150-500)  K/uL


 


MPV     (6.0-10.0)  fL


 


Neut % (Auto)     (45.0-70.0)  %


 


Lymph % (Auto)     (20.0-40.0)  %


 


Mono % (Auto)     (3.0-10.0)  %


 


Eos % (Auto)     (1.0-5.0)  %


 


Baso % (Auto)     (0.0-0.5)  %


 


Neut # (Auto)     (2.00-7.50)  K/uL


 


Lymph # (Auto)     (1.50-4.00)  K/uL


 


Mono # (Auto)     (0.20-0.80)  K/uL


 


Eos # (Auto)     (0.04-0.40)  K/uL


 


Baso # (Auto)     (0.02-0.10)  K/uL


 


ESR     (0-30)  mm/hr


 


Sodium     (136-145)  mmol/L


 


Potassium     (3.5-5.1)  mmol/L


 


Chloride     ()  mmol/L


 


Carbon Dioxide     (21.0-32.0)  mmol/L


 


Anion Gap     (5.0-15.0)  mmol/L


 


BUN     (8-26)  mg/dL


 


Creatinine     (0.55-1.02)  mg/dL


 


Est Cr Clr Drug Dosing     mL/min


 


Estimated GFR (MDRD)     (>60)  MLS/MIN


 


BUN/Creatinine Ratio     (6-25)  


 


Glucose     ()  mg/dL


 


Calcium     (8.5-10.1)  mg/dL


 


Magnesium   1.1 L   (1.8-2.4)  mg/dL


 


Total Bilirubin     (0.0-1.0)  mg/dL


 


AST     (15-37)  U/L


 


ALT     (12-78)  U/L


 


Alkaline Phosphatase     ()  U/L


 


Troponin I     (0.000-0.060)  ng/mL


 


C-Reactive Protein     (0.0-3.0)  mg/L


 


B-Natriuretic Peptide    677 H D  (0-125)  pg/mL


 


Total Protein     (6.4-8.2)  g/dL


 


Albumin     (3.4-5.0)  g/dL


 


Globulin     (2.2-4.2)  g/dL


 


Albumin/Globulin Ratio     (0.8-2.0)  


 


Amylase  50    ()  U/L


 


Urine Color     


 


Urine Appearance     (CLEAR)  


 


Urine pH     (5.0-8.0)  


 


Ur Specific Gravity     (1.003-1.030)  


 


Urine Protein     (NEGATIVE)  mg/dL


 


Urine Glucose (UA)     (NEGATIVE)  mg/dL


 


Urine Ketones     (NEGATIVE)  mg/dL


 


Urine Occult Blood     (NEGATIVE)  


 


Urine Nitrite     (NEGATIVE)  


 


Urine Bilirubin     (NEGATIVE)  


 


Urine Urobilinogen     (0.2-1.0)  E.U./dL


 


Ur Leukocyte Esterase     (NEGATIVE)  


 


Urine RBC     /HPF


 


Urine WBC     /HPF


 


Ur Squamous Epith Cells     /HPF


 


Urine Bacteria     /HPF


 


Urine Opiates Screen     (NEGATIVE)  


 


Ur Oxycodone Screen     (NEGATIVE)  


 


Urine Methadone Screen     (NEGATIVE)  


 


Ur Barbiturates Screen     (NEGATIVE)  


 


Ur Tricyclics Screen     (NEGATIVE)  


 


Ur Phencyclidine Scrn     (NEGATIVE)  


 


Ur Amphetamine Screen     (NEGATIVE)  


 


U Methamphetamines Scrn     (NEGATIVE)  


 


Urine MDMA Screen     (NEGATIVE)  


 


U Benzodiazepines Scrn     (NEGATIVE)  


 


U Cocaine Metab Screen     (NEGATIVE)  


 


U Marijuana (THC) Screen     (NEGATIVE)  














  20 Range/Units





  14:00 14:02 


 


WBC    (4.0-11.0)  K/uL


 


RBC    (3.80-5.80)  M/uL


 


Hgb    (11.5-16.5)  g/dL


 


Hct    (37.0-47.0)  %


 


MCV    (76-96)  fL


 


MCH    (27.0-32.0)  pg


 


MCHC    (31.0-35.0)  g/dL


 


RDW    (11.0-16.0)  %


 


Plt Count    (150-500)  K/uL


 


MPV    (6.0-10.0)  fL


 


Neut % (Auto)    (45.0-70.0)  %


 


Lymph % (Auto)    (20.0-40.0)  %


 


Mono % (Auto)    (3.0-10.0)  %


 


Eos % (Auto)    (1.0-5.0)  %


 


Baso % (Auto)    (0.0-0.5)  %


 


Neut # (Auto)    (2.00-7.50)  K/uL


 


Lymph # (Auto)    (1.50-4.00)  K/uL


 


Mono # (Auto)    (0.20-0.80)  K/uL


 


Eos # (Auto)    (0.04-0.40)  K/uL


 


Baso # (Auto)    (0.02-0.10)  K/uL


 


ESR    (0-30)  mm/hr


 


Sodium    (136-145)  mmol/L


 


Potassium    (3.5-5.1)  mmol/L


 


Chloride    ()  mmol/L


 


Carbon Dioxide    (21.0-32.0)  mmol/L


 


Anion Gap    (5.0-15.0)  mmol/L


 


BUN    (8-26)  mg/dL


 


Creatinine    (0.55-1.02)  mg/dL


 


Est Cr Clr Drug Dosing    mL/min


 


Estimated GFR (MDRD)    (>60)  MLS/MIN


 


BUN/Creatinine Ratio    (6-25)  


 


Glucose    ()  mg/dL


 


Calcium    (8.5-10.1)  mg/dL


 


Magnesium    (1.8-2.4)  mg/dL


 


Total Bilirubin    (0.0-1.0)  mg/dL


 


AST    (15-37)  U/L


 


ALT    (12-78)  U/L


 


Alkaline Phosphatase    ()  U/L


 


Troponin I    (0.000-0.060)  ng/mL


 


C-Reactive Protein    (0.0-3.0)  mg/L


 


B-Natriuretic Peptide    (0-125)  pg/mL


 


Total Protein    (6.4-8.2)  g/dL


 


Albumin    (3.4-5.0)  g/dL


 


Globulin    (2.2-4.2)  g/dL


 


Albumin/Globulin Ratio    (0.8-2.0)  


 


Amylase    ()  U/L


 


Urine Color  Yellow   


 


Urine Appearance  Clear   (CLEAR)  


 


Urine pH  6.5   (5.0-8.0)  


 


Ur Specific Gravity  1.020   (1.003-1.030)  


 


Urine Protein  Negative   (NEGATIVE)  mg/dL


 


Urine Glucose (UA)  Negative   (NEGATIVE)  mg/dL


 


Urine Ketones  Negative   (NEGATIVE)  mg/dL


 


Urine Occult Blood  Trace-intact H   (NEGATIVE)  


 


Urine Nitrite  Negative   (NEGATIVE)  


 


Urine Bilirubin  Negative   (NEGATIVE)  


 


Urine Urobilinogen  0.2   (0.2-1.0)  E.U./dL


 


Ur Leukocyte Esterase  Small H   (NEGATIVE)  


 


Urine RBC  0-5 H   /HPF


 


Urine WBC  5-10 H   /HPF


 


Ur Squamous Epith Cells  Few   /HPF


 


Urine Bacteria  Few   /HPF


 


Urine Opiates Screen   Negative  (NEGATIVE)  


 


Ur Oxycodone Screen   Negative  (NEGATIVE)  


 


Urine Methadone Screen   Negative  (NEGATIVE)  


 


Ur Barbiturates Screen   Negative  (NEGATIVE)  


 


Ur Tricyclics Screen   Negative  (NEGATIVE)  


 


Ur Phencyclidine Scrn   Negative  (NEGATIVE)  


 


Ur Amphetamine Screen   Negative  (NEGATIVE)  


 


U Methamphetamines Scrn   Negative  (NEGATIVE)  


 


Urine MDMA Screen   Negative  (NEGATIVE)  


 


U Benzodiazepines Scrn   Negative  (NEGATIVE)  


 


U Cocaine Metab Screen   Negative  (NEGATIVE)  


 


U Marijuana (THC) Screen   Negative  (NEGATIVE)  











Meds: 


Medications














Discontinued Medications














Generic Name Dose Route Start Last Admin





  Trade Name Freq  PRN Reason Stop Dose Admin


 


Albuterol/Ipratropium  Confirm  20 15:29  20 15:20





  Duoneb 3.0-0.5 Mg/3 Ml  Administered  20 15:30  3 ml





  Dose   Administration





  3 ml   





  .ROUTE   





  .STK-MED ONE   





     





     





     





     


 


Albuterol/Ipratropium  3 ml  20 16:00  20 16:10





  Duoneb 3.0-0.5 Mg/3 Ml  NEB   Not Given





  QID YADIRA   





     





     





     





     


 


Diazepam  2 mg  20 15:00  20 15:00





  Valium  IVPUSH  20 15:01  2 mg





  ONETIME ONE   Administration





     





     





     





     


 


Diphenhydramine HCl  Confirm  20 15:17  20 16:07





  Benadryl  Administered  20 15:18  Not Given





  Dose   





  50 mg   





  .ROUTE   





  .STK-MED ONE   





     





     





     





     


 


Diphenhydramine HCl  25 mg  20 16:12  20 15:17





  Benadryl  IVPUSH  20 16:13  25 mg





  ONETIME ONE   Administration





     





     





     





     


 


Sodium Chloride  1,000 mls @ 999 mls/hr  20 14:30  20 14:30





  Normal Saline  IV  20 15:30  999 mls/hr





  .BOLUS ONE   Administration





     





     





     





     


 


Magnesium Sulfate 2 gm/ Premix  50 mls @ 25 mls/hr  20 16:30  20 16:

53





  IV  20 18:29  25 mls/hr





  ONETIME ONE   Administration





     





     





     





     


 


Indomethacin  100 mg  20 13:48  20 14:03





  Indocin  PO  20 13:49  100 mg





  ONETIME ONE   Administration





     





     





     





     


 


Ketamine HCl  20 mg  20 14:53  20 14:59





  Ketalar  IVPUSH  20 14:54  Not Given





  ONETIME ONE   





     





     





     





     


 


Ketamine HCl  35 mg  20 14:54  20 14:55





  Ketalar  IVPUSH  20 14:55  35 mg





  ONETIME ONE   Administration





     





     





     





     


 


Lorazepam  Confirm  20 15:44  20 16:07





  Ativan  Administered  20 15:45  Not Given





  Dose   





  2 mg   





  .ROUTE   





  .STK-MED ONE   





     





     





     





     


 


Lorazepam  1 mg  20 15:49  20 15:50





  Ativan  IVPUSH  20 15:50  1 mg





  ONETIME ONE   Administration





     





     





     





     


 


Lorazepam  1 mg  20 16:09  20 16:22





  Ativan  IVPUSH   1 mg





  Q10M PRN   Administration





  Agitation   





     





     





     


 


Sumatriptan Succinate  6 mg  20 13:30  20 13:40





  Imitrex  SUBCUT  20 13:31  6 mg





  ONETIME ONE   Administration





     





     





     





     














Departure





- Departure


Time of Disposition: 17:30


Disposition: Home, Self-Care 01


Clinical Impression: 


Medication adverse effect


Qualifiers:


 Encounter type: initial encounter Qualified Code(s): T50.905A - Adverse effect 

of unspecified drugs, medicaments and biological substances, initial encounter








- Discharge Information


Instructions:  Drug Allergy, Easy-to-Read


Referrals: 


PCP,None [Primary Care Provider] - 


Forms:  ED Department Discharge


Additional Instructions: 


Beth,





I'm sorry that the ketamine infusion caused you to panic.  I know it was very 

scary for you, but you were strong, and rode it out.  With any luck, hopefully 

it will help your headaches, depression, and chronic pain troubles.  As you know

, we have learned that pain medicine just covers things up and the problem 

often persists.  This is especially true with narcotics and rebound headaches.  

We tried pretty much everything we have in this hospital to help you, so I'm 

glad we knocked your migraine out.  Good luck with your psych doctor this week.

  I hope they get your anxiety squared away, and Thank you in advance for doing 

the echo to ensure your heart is pumping well.  You should get your magnesium 

level checked the next time you have blood work.  Good luck with everything.  





Sincerely,


Sravan James MD





Sepsis Event Note





- Evaluation


Sepsis Screening Result: No Definite Risk





- Focused Exam


Vital Signs: 


 Vital Signs











  Temp Pulse Resp BP Pulse Ox


 


 20 17:39   100  18  151/94 H  97


 


 20 15:37   132 H  18  108/83  95


 


 20 13:05  97.8 F  89  20  160/83 H  98


 


 20 13:00  97.8 F  83  18  160/83 H  98











Date Exam was Performed: 20


Time Exam was Performed: 19:15





- My Orders


Last 24 Hours: 


My Active Orders





20 13:11


[facility] [Cervical Spine wo Cont] [CT] Stat 





20 13:40


VITAMIN D, 25-HYDROXY Stat 





20 14:00


CULTURE URINE [RM] Stat 





20 15:55


RT Aerosol Therapy [RC] ASDIRECTED 














- Assessment/Plan


Last 24 Hours: 


My Active Orders





20 13:11


C-Spine [Cervical Spine wo Cont] [CT] Stat 





20 13:40


VITAMIN D, 25-HYDROXY Stat 





20 14:00


CULTURE URINE [RM] Stat 





20 15:55


RT Aerosol Therapy [RC] ASDIRECTED

## 2020-07-24 ENCOUNTER — HOSPITAL ENCOUNTER (OUTPATIENT)
Dept: HOSPITAL 60 - LB.SDS | Age: 55
Discharge: HOME | End: 2020-07-24
Attending: SURGERY
Payer: MEDICARE

## 2020-07-24 VITALS — SYSTOLIC BLOOD PRESSURE: 112 MMHG | DIASTOLIC BLOOD PRESSURE: 72 MMHG

## 2020-07-24 VITALS — HEART RATE: 79 BPM

## 2020-07-24 DIAGNOSIS — Z88.8: ICD-10-CM

## 2020-07-24 DIAGNOSIS — I10: ICD-10-CM

## 2020-07-24 DIAGNOSIS — Z98.890: ICD-10-CM

## 2020-07-24 DIAGNOSIS — R20.2: ICD-10-CM

## 2020-07-24 DIAGNOSIS — Z79.899: ICD-10-CM

## 2020-07-24 DIAGNOSIS — Z12.11: Primary | ICD-10-CM

## 2020-07-24 DIAGNOSIS — G89.29: ICD-10-CM

## 2020-07-24 DIAGNOSIS — F17.200: ICD-10-CM

## 2020-07-24 DIAGNOSIS — R06.02: ICD-10-CM

## 2020-07-24 DIAGNOSIS — Z86.010: ICD-10-CM

## 2020-07-24 DIAGNOSIS — E11.9: ICD-10-CM

## 2020-07-24 PROCEDURE — 82962 GLUCOSE BLOOD TEST: CPT

## 2020-07-24 NOTE — OR
DATE OF OPERATION:  07/24/2020

 

SURGEON:  Parker Santana MD

 

PREOPERATIVE DIAGNOSIS:  Surveillance colonoscopy.

 

POSTOPERATIVE DIAGNOSIS:  Surveillance colonoscopy.

 

PROCEDURE:  Colonoscopy.

 

ANESTHESIA:  MAC.

 

ESTIMATED BLOOD LOSS:  None.

 

COMPLICATIONS:  None.

 

INDICATION FOR THE PROCEDURE:  The patient is a 55-year-old female, who 3 years 
ago had a

colonoscopy, who was found to have multiple polyps on that time and was set for 
3-year

followup.  She is here today for that.  She denies any other change in bowel 
habits since

her last scope.

 

DESCRIPTION OF PROCEDURE:  Informed consent was obtained from the patient.  The 
patient was

taken to the operating room, placed on the table in left lateral decubitus 
position.

Monitored anesthesia care was administered.  Digital rectal exam performed and 
was normal.

Colonoscope was then advanced through the anus, directed towards the cecum.  
Cecum was

reached and identified by appendiceal orifice and the ileocecal valve.  
Colonoscope was then

slowly withdrawn.  The prep was adequate, but did require copious amounts of 
irrigation,

good views after irrigation.  No polyps, no masses, no areas of ischemia or

inflammation identified.  Retroflexion performed in the rectum was also 
otherwise

unremarkable.  Colonoscope was then withdrawn.

 

FINDINGS:  Normal colonoscopy.

 

RECOMMENDATIONS:  We would recommend repeat surveillance colonoscopy in 5 years.

 

 

ASHVIN

DD:  07/24/2020 10:06:29

DT:  07/24/2020 10:27:02

Job #:  [id number]

[email or web address]

## 2020-08-30 ENCOUNTER — HOSPITAL ENCOUNTER (EMERGENCY)
Dept: HOSPITAL 60 - LB.ED | Age: 55
Discharge: HOME | End: 2020-08-30
Payer: MEDICARE

## 2020-08-30 VITALS — DIASTOLIC BLOOD PRESSURE: 71 MMHG | SYSTOLIC BLOOD PRESSURE: 146 MMHG | HEART RATE: 98 BPM

## 2020-08-30 DIAGNOSIS — E66.9: ICD-10-CM

## 2020-08-30 DIAGNOSIS — T40.2X5A: ICD-10-CM

## 2020-08-30 DIAGNOSIS — Z88.8: ICD-10-CM

## 2020-08-30 DIAGNOSIS — I10: ICD-10-CM

## 2020-08-30 DIAGNOSIS — Z79.899: ICD-10-CM

## 2020-08-30 DIAGNOSIS — Z79.84: ICD-10-CM

## 2020-08-30 DIAGNOSIS — E11.42: ICD-10-CM

## 2020-08-30 DIAGNOSIS — Z90.710: ICD-10-CM

## 2020-08-30 DIAGNOSIS — Z88.4: ICD-10-CM

## 2020-08-30 DIAGNOSIS — R00.0: ICD-10-CM

## 2020-08-30 DIAGNOSIS — J43.9: ICD-10-CM

## 2020-08-30 DIAGNOSIS — K59.03: Primary | ICD-10-CM

## 2020-08-30 PROCEDURE — 81003 URINALYSIS AUTO W/O SCOPE: CPT

## 2020-08-30 PROCEDURE — 99283 EMERGENCY DEPT VISIT LOW MDM: CPT

## 2020-08-30 NOTE — EDM.PDOC
ED HPI GENERAL MEDICAL PROBLEM





- General


Chief Complaint: General


Stated Complaint: CONSTIPATION


Time Seen by Provider: 20 08:45


Source of Information: Reports: Patient


History Limitations: Reports: No Limitations





- History of Present Illness


INITIAL COMMENTS - FREE TEXT/NARRATIVE: 





Patient had an angiogram in , has not had a BM since despite 

taking stool softners BID.  She was given IV fentanyl for the procedure.  She 

took a laxative today at 0200 with no relief.  She is able to pass gas and 

urinate, but had some difficulty starting a stream yesterday.  Denies any 

CP/SOB, urinary pain, frequency, blood per rectum, N/V/D. Has had issues with 

constipation adn IBS in the past and this feels the same per patient.


Onset Date: 20


Improves with: Reports: None


Worsens with: Reports: Other (lying flat)


Treatments PTA: Reports: Other (see below) (colace, laxative)





- Related Data


                                    Allergies











Allergy/AdvReac Type Severity Reaction Status Date / Time


 


adhesive tape Allergy  Rash Verified 20 10:17


 


celecoxib [From Celebrex] Allergy  Nausea and Verified 20 10:17





   Vomiting  


 


ketamine AdvReac Intermediate Agitation Verified 20 10:17


 


ropinirole AdvReac  Diaphoresis Verified 20 10:17











Home Meds: 


                                    Home Meds





Omeprazole 20 mg PO ACBREAKFAST 13 [History]


QUEtiapine [SEROquel] 100 mg PO QAM 11/21/15 [History]


Spironolactone [Aldactone] 3 tab PO BID 11/21/15 [History]


QUEtiapine Fumarate [Quetiapine Fumarate] 400 mg PO QPM 12/10/17 [History]


Calcium Carbonate/Vitamin D3 [Calcium 600 + D3 Softgel] 1 tab PO BID 18 

[History]


Ubidecarenone [Co Q-10] 200 mg PO DAILY 19 [History]


Meloxicam 7.5 mg PO BID 19 [History]


Mirtazapine [Remeron] 30 mg PO BEDTIME 19 [History]


Albuterol Sulfate [Proair Digihaler] 2 inh INH Q4HR PRN 20 [History]


Budesonide/Formoterol [Symbicort 160-4.5 MCG] 2 inh INH BID 20 [History]


Ipratropium/Albuterol Sulfate [Iprat-Albut 0.5-3(2.5) MG/3 ML] 1 ampule INH Q4HR

PRN 20 [History]


Pramipexole Di-HCl [Pramipexole Dihydrochloride] 0.25 mg PO DAILY 20 

[History]


Topiramate 25 mg PO BID 20 [History]


Varenicline [Chantix] 0.5 mg PO DAILY 20 [History]


Varenicline [Chantix] 1 mg PO DAILY 20 [History]


ondansetron HCL [Zofran] 4 mg PO Q4HR PRN 20 [History]


sitaGLIPtin Phos/Metformin HCl [Janumet 50-1,000 MG] 50 - 1,000 mg PO BID 

20 [History]


risperiDONE [Risperdal] 5 tab PO BEDTIME 20 [History]











Past Medical History





- Past Health History


Medical/Surgical History: Denies Medical/Surgical History


HEENT History: Reports: Impaired Vision, Other (See Below)


Other HEENT History: glasses for reading,


Cardiovascular History: Reports: Hypertension


Respiratory History: Reports: Asthma, Bronchitis, Recurrent, Pneumonia, 

Recurrent, Other (See Below)


Other Respiratory History: Emphysema


Gastrointestinal History: Reports: Bowel Obstruction, GERD, Irritable Bowel 

Syndrome


Genitourinary History: Reports: None


OB/GYN History: Reports: Dysfunctional Uterine Bleeding, Pregnancy


Musculoskeletal History: Reports: Back Pain, Chronic


Other Musculoskeletal History: back / butt pain, shoots down left leg


Neurological History: Reports: Migraines, Neuropathy, Peripheral


Psychiatric History: Reports: Anxiety, Depression, Suicide Attempt, Suicidal 

Ideation


Endocrine/Metabolic History: Reports: Diabetes, Type II, Obesity/BMI 30+


Hematologic History: Reports: Blood Transfusion(s), Iron Deficiency


Dermatologic History: Reports: Eczema, Other (See Below)


Other Dermatologic History: Allergy to paper tape





- Infectious Disease History


Infectious Disease History: Reports: Chicken Pox, Measles, Rubella





- Past Surgical History


GI Surgical History: Reports: None


Female  Surgical History: Reports:  Section, Hysterectomy


Musculoskeletal Surgical History: Reports: None





Social & Family History





- Family History


Family Medical History: Noncontributory


Cardiac: Reports: None


Musculoskeletal: Reports: Arthritis, Back pain, Chronic


Neurological: Reports: None


Psychiatric: Reports: Depression


Endocrine/Metabolic: Reports: None


Oncologic: Reports: None





- Caffeine Use


Caffeine Use: Reports: Soda


Other Caffeine Use: 2 cups a day





ED ROS GENERAL





- Review of Systems


Review Of Systems: See Below


Constitutional: Reports: No Symptoms


HEENT: Reports: No Symptoms


Respiratory: Reports: No Symptoms


Cardiovascular: Reports: No Symptoms


Endocrine: Reports: No Symptoms


GI/Abdominal: Reports: Constipation


: Reports: Other (issues starting urine stream yesterday, denies any issues 

today)


Musculoskeletal: Reports: No Symptoms


Skin: Reports: No Symptoms, Bruising (from angiogram on bilateral forearms)


Neurological: Reports: No Symptoms


Psychiatric: Reports: No Symptoms


Hematologic/Lymphatic: Reports: No Symptoms


Immunologic: Reports: No Symptoms





ED EXAM, GENERAL





- Physical Exam


Exam: See Below


Free Text/Narrative:: 





On rectal exam, a large amount of hard stool is felt but is not able to be 

removed.  Patient is agreeable to fleets enema.  


Exam Limited By: No Limitations


General Appearance: Alert, No Apparent Distress


Throat/Mouth: Normal Voice


Head: Atraumatic


Neck: Normal Inspection, Full Range of Motion


Respiratory/Chest: No Respiratory Distress, Lungs Clear


Cardiovascular: Regular Rate, Rhythm, Tachycardia


GI/Abdominal: Soft, Distended, Tender, Other


Rectal (Female) Exam: Normal Exam, Normal Rectal Tone, Tenderness


Back Exam: Normal Inspection.  No: CVA Tenderness (R), CVA Tenderness (L)


Extremities: Normal Inspection, Normal Capillary Refill


Neurological: Alert, Oriented


Psychiatric: Normal Affect, Normal Mood


Skin Exam: Warm, Dry





Course





- Vital Signs


Last Recorded V/S: 


                                Last Vital Signs











Temp  95.0 F L  20 08:40


 


Pulse  98   20 08:40


 


Resp  18   20 08:40


 


BP  146/71 H  20 08:40


 


Pulse Ox  99   20 08:40














- Orders/Labs/Meds


Labs: 


                                Laboratory Tests











  20 Range/Units





  08:55 


 


Urine Color  Yellow  


 


Urine Appearance  Clear  (CLEAR)  


 


Urine pH  6.0  (5.0-8.0)  


 


Ur Specific Gravity  1.020  (1.003-1.030)  


 


Urine Protein  Negative  (NEGATIVE)  mg/dL


 


Urine Glucose (UA)  100 H  (NEGATIVE)  mg/dL


 


Urine Ketones  Negative  (NEGATIVE)  mg/dL


 


Urine Occult Blood  Negative  (NEGATIVE)  


 


Urine Nitrite  Negative  (NEGATIVE)  


 


Urine Bilirubin  Negative  (NEGATIVE)  


 


Urine Urobilinogen  0.2  (0.2-1.0)  E.U./dL


 


Ur Leukocyte Esterase  Negative  (NEGATIVE)  














Departure





- Departure


Time of Disposition: 10:33


Disposition: Home, Self-Care 01


Condition: Good


Clinical Impression: 


 Constipation due to opioid therapy





Constipation


Qualifiers:


 Constipation type: drug induced constipation Qualified Code(s): K59.03 - Drug 

induced constipation








- Discharge Information


*PRESCRIPTION DRUG MONITORING PROGRAM REVIEWED*: Not Applicable


*COPY OF PRESCRIPTION DRUG MONITORING REPORT IN PATIENT JAE: Not Applicable


Instructions:  Constipation, Adult, Easy-to-Read


Additional Instructions: 


Increase fluids.  Do NOT take any imodium as we discussed.  Returnt to ED for 

any increased or new concerning symptoms.  





Sepsis Event Note (ED)





- Evaluation


Sepsis Screening Result: No Definite Risk





- Focused Exam


Vital Signs: 


                                   Vital Signs











  Temp Pulse Resp BP Pulse Ox


 


 /20 08:40  95.0 F L  98  18  146/71 H  99














- Problem List Review


Problem List Initiated/Reviewed/Updated: Yes





- Assessment/Plan


Plan: 





Patient DC to home.  Decreased lower abdominal pain, but reports continued 

cramping.  She will return to ED for an y increased or new concerning symptoms. 

 


Differential diagnosis may include:  bowel obstruction, appy, diverticulitis, or

 UTI.  On exam and with patient history, we treated with enema and patient had 

decreased pain with medium sized BM in ED.  UA showed no infection at this 

time.Patient is comfortable returning home, increasing fluids.

## 2020-09-20 ENCOUNTER — HOSPITAL ENCOUNTER (INPATIENT)
Dept: HOSPITAL 60 - LB.ED | Age: 55
LOS: 1 days | Discharge: HOME | DRG: 189 | End: 2020-09-21
Attending: NURSE PRACTITIONER | Admitting: NURSE PRACTITIONER
Payer: MEDICARE

## 2020-09-20 DIAGNOSIS — G43.909: ICD-10-CM

## 2020-09-20 DIAGNOSIS — J45.909: ICD-10-CM

## 2020-09-20 DIAGNOSIS — F32.9: ICD-10-CM

## 2020-09-20 DIAGNOSIS — Z88.8: ICD-10-CM

## 2020-09-20 DIAGNOSIS — J81.0: Primary | ICD-10-CM

## 2020-09-20 DIAGNOSIS — G89.29: ICD-10-CM

## 2020-09-20 DIAGNOSIS — Z79.899: ICD-10-CM

## 2020-09-20 DIAGNOSIS — Z91.048: ICD-10-CM

## 2020-09-20 DIAGNOSIS — Z79.82: ICD-10-CM

## 2020-09-20 DIAGNOSIS — I10: ICD-10-CM

## 2020-09-20 DIAGNOSIS — K21.9: ICD-10-CM

## 2020-09-20 DIAGNOSIS — Z79.52: ICD-10-CM

## 2020-09-20 DIAGNOSIS — Z79.51: ICD-10-CM

## 2020-09-20 DIAGNOSIS — H54.7: ICD-10-CM

## 2020-09-20 DIAGNOSIS — E66.9: ICD-10-CM

## 2020-09-20 DIAGNOSIS — Z87.01: ICD-10-CM

## 2020-09-20 DIAGNOSIS — Z90.710: ICD-10-CM

## 2020-09-20 DIAGNOSIS — Z79.84: ICD-10-CM

## 2020-09-20 DIAGNOSIS — K58.9: ICD-10-CM

## 2020-09-20 DIAGNOSIS — N93.8: ICD-10-CM

## 2020-09-20 DIAGNOSIS — E11.42: ICD-10-CM

## 2020-09-20 DIAGNOSIS — L30.9: ICD-10-CM

## 2020-09-20 DIAGNOSIS — E61.1: ICD-10-CM

## 2020-09-20 DIAGNOSIS — Z88.1: ICD-10-CM

## 2020-09-20 DIAGNOSIS — F17.200: ICD-10-CM

## 2020-09-20 DIAGNOSIS — J45.901: ICD-10-CM

## 2020-09-20 DIAGNOSIS — M54.9: ICD-10-CM

## 2020-09-20 DIAGNOSIS — Z20.828: ICD-10-CM

## 2020-09-20 DIAGNOSIS — J43.9: ICD-10-CM

## 2020-09-20 DIAGNOSIS — F17.210: ICD-10-CM

## 2020-09-20 DIAGNOSIS — F41.9: ICD-10-CM

## 2020-09-20 PROCEDURE — U0002 COVID-19 LAB TEST NON-CDC: HCPCS

## 2020-09-20 RX ADMIN — MOMETASONE FUROATE AND FORMOTEROL FUMARATE DIHYDRATE SCH: 200; 5 AEROSOL RESPIRATORY (INHALATION) at 22:25

## 2020-09-20 NOTE — EDM.PDOC
ED HPI GENERAL MEDICAL PROBLEM





- General


Chief Complaint: Respiratory Problem


Stated Complaint: Shortness of Breath


Time Seen by Provider: 20 14:36


Source of Information: Reports: Patient


History Limitations: Reports: No Limitations





- History of Present Illness


INITIAL COMMENTS - FREE TEXT/NARRATIVE: 





SOB since yesterday, increased now with anxiety.  RR 32 on arrival.  PMH asthma,

had been using nebs at home without relief.


Severity: Moderate


Associated Symptoms: Reports: Cough, Shortness of Breath


Treatments PTA: Reports: Breathing Treatments


  ** Leg


Pain Score (Numeric/FACES): 7





- Related Data


                                    Allergies











Allergy/AdvReac Type Severity Reaction Status Date / Time


 


adhesive tape Allergy  Rash Verified 20 10:17


 


celecoxib [From Celebrex] Allergy  Nausea and Verified 20 10:17





   Vomiting  


 


ketamine AdvReac Intermediate Agitation Verified 20 10:17


 


ropinirole AdvReac  Diaphoresis Verified 20 10:17











Home Meds: 


                                    Home Meds





Omeprazole 20 mg PO ACBREAKFAST 13 [History]


QUEtiapine [SEROquel] 100 mg PO QAM 11/21/15 [History]


Spironolactone [Aldactone] 3 tab PO BID 11/21/15 [History]


QUEtiapine Fumarate [Quetiapine Fumarate] 400 mg PO QPM 12/10/17 [History]


Calcium Carbonate/Vitamin D3 [Calcium 600 + D3 Softgel] 1 tab PO BID 18 

[History]


Ubidecarenone [Co Q-10] 200 mg PO DAILY 19 [History]


Mirtazapine [Remeron] 30 mg PO BEDTIME 19 [History]


Albuterol Sulfate [Proair Digihaler] 2 inh INH Q4HR PRN 20 [History]


Budesonide/Formoterol [Symbicort 160-4.5 MCG] 2 inh INH BID 20 [History]


Ipratropium/Albuterol Sulfate [Iprat-Albut 0.5-3(2.5) MG/3 ML] 1 ampule INH Q4HR

PRN 20 [History]


Pramipexole Di-HCl [Pramipexole Dihydrochloride] 0.25 mg PO DAILY 20 

[History]


Topiramate 25 mg PO BID 20 [History]


Varenicline [Chantix] 0.5 mg PO DAILY 20 [History]


Varenicline [Chantix] 1 mg PO DAILY 20 [History]


ondansetron HCL [Zofran] 4 mg PO Q4HR PRN 20 [History]


risperiDONE [Risperdal] 5 mg PO BEDTIME 20 [History]


Acetaminophen [Tylenol] 650 mg PO Q8HR 20 [History]


Aspirin [Halfprin] 81 mg PO DAILY 20 [History]


Furosemide 20 mg PO DAILY 20 [History]


Losartan [Cozaar] 25 mg PO DAILY 20 [History]


Montelukast [Singulair] 10 mg PO DAILY 20 [History]


SUMAtriptan [Imitrex] 25 mg PO ASDIRECTED 20 [History]


amLODIPine Besylate [Norvasc] 2.5 mg PO DAILY 20 [History]


sitaGLIPtin Phos/Metformin HCl [Janumet 50-1,000 MG] 1 tab PO BID 20 

[History]











Past Medical History





- Past Health History


Medical/Surgical History: Denies Medical/Surgical History


HEENT History: Reports: Impaired Vision, Other (See Below)


Other HEENT History: glasses for reading,


Cardiovascular History: Reports: Hypertension


Respiratory History: Reports: Asthma, Bronchitis, Recurrent, Pneumonia, 

Recurrent, Other (See Below)


Other Respiratory History: Emphysema


Gastrointestinal History: Reports: Bowel Obstruction, GERD, Irritable Bowel 

Syndrome


Genitourinary History: Reports: None


OB/GYN History: Reports: Dysfunctional Uterine Bleeding, Pregnancy


Musculoskeletal History: Reports: Back Pain, Chronic


Other Musculoskeletal History: back / butt pain, shoots down left leg


Neurological History: Reports: Migraines, Neuropathy, Peripheral


Psychiatric History: Reports: Anxiety, Depression, Suicide Attempt, Suicidal 

Ideation


Endocrine/Metabolic History: Reports: Diabetes, Type II, Obesity/BMI 30+


Hematologic History: Reports: Blood Transfusion(s), Iron Deficiency


Dermatologic History: Reports: Eczema, Other (See Below)


Other Dermatologic History: Allergy to paper tape





- Infectious Disease History


Infectious Disease History: Reports: Chicken Pox, Measles, Rubella





- Past Surgical History


GI Surgical History: Reports: None


Female  Surgical History: Reports:  Section, Hysterectomy


Musculoskeletal Surgical History: Reports: None





Social & Family History





- Family History


Family Medical History: Noncontributory


Cardiac: Reports: None


Musculoskeletal: Reports: Arthritis, Back pain, Chronic


Neurological: Reports: None


Psychiatric: Reports: Depression


Endocrine/Metabolic: Reports: None


Oncologic: Reports: None





- Tobacco Use


Smoking Status *Q: Current Some Day Smoker


Years of Tobacco use: 40


Packs/Tins Daily: 0.5





- Caffeine Use


Caffeine Use: Reports: Coffee


Other Caffeine Use: 2 cups a day





- Recreational Drug Use


Recreational Drug Use: No





ED ROS GENERAL





- Review of Systems


Review Of Systems: See Below


Constitutional: Reports: No Symptoms


HEENT: Reports: No Symptoms


Respiratory: Reports: Shortness of Breath, Wheezing, Pleuritic Chest Pain, Cough


Cardiovascular: Reports: Dyspnea on Exertion, Orthopnea


GI/Abdominal: Reports: No Symptoms


: Reports: No Symptoms


Musculoskeletal: Reports: No Symptoms


Skin: Reports: No Symptoms


Neurological: Reports: No Symptoms


Psychiatric: Reports: Anxiety





ED EXAM, GENERAL





- Physical Exam


Exam: See Below


Exam Limited By: No Limitations


General Appearance: Alert, Mild Distress


Ears: Normal External Exam


Throat/Mouth: Normal Inspection, Normal Oropharynx, Normal Voice


Head: Atraumatic


Neck: Normal Inspection, Full Range of Motion


Respiratory/Chest: Decreased Breath Sounds, Wheezing


Cardiovascular: Normal Peripheral Pulses, No JVD, Tachycardia, Systolic Murmur


GI/Abdominal: Soft


 (Female) Exam: Deferred


Rectal (Female) Exam: Deferred


Back Exam: Normal Inspection, Full Range of Motion.  No: CVA Tenderness (R), CVA

 Tenderness (L)


Extremities: Normal Inspection, No Pedal Edema, Normal Capillary Refill


Neurological: Alert, Oriented, Normal Cognition, No Motor/Sensory Deficits


Psychiatric: Normal Mood, Anxious


Skin Exam: Warm, Dry





Course





- Vital Signs


Last Recorded V/S: 


                                Last Vital Signs











Temp  98 F   20 18:52


 


Pulse  80   20 18:52


 


Resp  20   20 18:52


 


BP  139/70   20 18:52


 


Pulse Ox  97   20 18:52














- Orders/Labs/Meds


Orders: 


                               Active Orders 24 hr











 Category Date Time Status


 


 Admission Status [Patient Status] [ADT] Routine ADT  20 17:16 Active


 


 Cardiac Monitoring [RC] .As Directed Care  20 17:16 Active


 


 EKG Documentation Completion [RC] ASDIRECTED Care  20 14:30 Active


 


 RT Aerosol Therapy [RC] ASDIRECTED Care  20 14:27 Active


 


 RT Aerosol Therapy [RC] ASDIRECTED Care  20 16:23 Active


 


 Chest 1V Frontal [CR] Stat Exams  20 14:26 Taken


 


 Albuterol/Ipratropium [DuoNeb 3.0-0.5 MG/3 ML] Med  20 16:22 Active





 3 ml NEB Q4H PRN   








                                Medication Orders





Albuterol/Ipratropium (Duoneb 3.0-0.5 Mg/3 Ml)  3 ml NEB Q4H PRN


   PRN Reason: Shortness of Breath


   Last Admin: 20 16:30  Dose: 3 ml


   Documented by: [email or web address]








Labs: 


                                Laboratory Tests











  20 Range/Units





  14:35 14:40 17:16 


 


WBC  4.7    (4.0-11.0)  K/uL


 


RBC  3.89    (3.80-5.80)  M/uL


 


Hgb  11.8    (11.5-16.5)  g/dL


 


Hct  35.7 L    (37.0-47.0)  %


 


MCV  92    (76-96)  fL


 


MCH  30.3    (27.0-32.0)  pg


 


MCHC  33.1    (31.0-35.0)  g/dL


 


RDW  13.3    (11.0-16.0)  %


 


Plt Count  155    (150-500)  K/uL


 


MPV  11.4 H    (6.0-10.0)  fL


 


Neut % (Auto)  71.6 H    (45.0-70.0)  %


 


Lymph % (Auto)  18.8 L    (20.0-40.0)  %


 


Mono % (Auto)  8.6    (3.0-10.0)  %


 


Eos % (Auto)  0.4 L    (1.0-5.0)  %


 


Baso % (Auto)  0.6 H    (0.0-0.5)  %


 


Neut # (Auto)  3.39    (2.00-7.50)  K/uL


 


Lymph # (Auto)  0.89 L    (1.50-4.00)  K/uL


 


Mono # (Auto)  0.41    (0.20-0.80)  K/uL


 


Eos # (Auto)  0.02 L    (0.04-0.40)  K/uL


 


Baso # (Auto)  0.03    (0.02-0.10)  K/uL


 


Sodium   140   (136-145)  mmol/L


 


Potassium   3.8   (3.5-5.1)  mmol/L


 


Chloride   104   ()  mmol/L


 


Carbon Dioxide   24.0   (21.0-32.0)  mmol/L


 


Anion Gap   15.8 H   (5.0-15.0)  mmol/L


 


BUN   12   (8-26)  mg/dL


 


Creatinine   1.12 H   (0.55-1.02)  mg/dL


 


Est Cr Clr Drug Dosing   TNP   


 


Estimated GFR (MDRD)   51 L   (>60)  MLS/MIN


 


BUN/Creatinine Ratio   10.7   (6-25)  


 


Glucose   275 H D   ()  mg/dL


 


Calcium   8.8   (8.5-10.1)  mg/dL


 


Total Bilirubin   0.3  D   (0.0-1.0)  mg/dL


 


AST   16   (15-37)  U/L


 


ALT   23   (12-78)  U/L


 


Alkaline Phosphatase   67   ()  U/L


 


B-Natriuretic Peptide   594 H   (0-125)  pg/mL


 


Total Protein   7.1   (6.4-8.2)  g/dL


 


Albumin   3.3 L   (3.4-5.0)  g/dL


 


Globulin   3.8   (2.2-4.2)  g/dL


 


Albumin/Globulin Ratio   0.9   (0.8-2.0)  


 


SARS CoV-2 RNA Rapid MAKAYLA    Negative  











Meds: 


Medications











Generic Name Dose Route Start Last Admin





  Trade Name Freq  PRN Reason Stop Dose Admin


 


Albuterol/Ipratropium  3 ml  20 16:22  20 16:30





  Duoneb 3.0-0.5 Mg/3 Ml  NEB   3 ml





  Q4H PRN   Administration





  Shortness of Breath  














Discontinued Medications














Generic Name Dose Route Start Last Admin





  Trade Name Freq  PRN Reason Stop Dose Admin


 


Albuterol/Ipratropium  3 ml  20 14:26  20 14:40





  Duoneb 3.0-0.5 Mg/3 Ml  NEB   3 ml





  Q4H PRN   Administration





  Shortness of Breath  


 


Furosemide  40 mg  20 16:22  20 17:04





  Lasix  IVPUSH  20 16:23  40 mg





  NOW ONE   Administration


 


Furosemide  Confirm  20 17:12  20 17:05





  Lasix  Administered  20 17:13  Not Given





  Dose  





  40 mg  





  .ROUTE  





  .STK-MED ONE  


 


Methylprednisolone Sodium Succinate  125 mg  20 14:26  20 15:01





  Solu-Medrol  IVPUSH  20 14:27  125 mg





  ONETIME ONE   Administration














Departure





- Departure


Time of Disposition: 19:00


Disposition: Admitted As Inpatient 66


Preliminary Cause of Death *Q: Cardiac Arrest


Clinical Impression: 


Exacerbation of asthma


Qualifiers:


 Asthma severity: moderate Asthma persistence: unspecified Qualified Code(s): 

J45.901 - Unspecified asthma with (acute) exacerbation





Pulmonary edema


Qualifiers:


 Chronicity: acute Qualified Code(s): J81.0 - Acute pulmonary edema








- Discharge Information


*PRESCRIPTION DRUG MONITORING PROGRAM REVIEWED*: Not Applicable


*COPY OF PRESCRIPTION DRUG MONITORING REPORT IN PATIENT JAE: Not Applicable





Sepsis Event Note (ED)





- Evaluation


Sepsis Screening Result: No Definite Risk





- Focused Exam


Vital Signs: 


                                   Vital Signs











  Temp Pulse Resp BP Pulse Ox


 


 20 18:08  98.3 F  83  20   97


 


 20 14:39  98.3 F  84  24 H  


 


 20 14:36  98.3 F  94  40 H  161/76 H  91 L














- My Orders


Last 24 Hours: 


My Active Orders





20 14:26


Chest 1V Frontal [CR] Stat 





20 14:27


RT Aerosol Therapy [RC] ASDIRECTED 





20 14:30


EKG Documentation Completion [RC] ASDIRECTED 





20 16:22


Albuterol/Ipratropium [DuoNeb 3.0-0.5 MG/3 ML]   3 ml NEB Q4H PRN 





20 16:23


RT Aerosol Therapy [RC] ASDIRECTED 





20 17:16


Admission Status [Patient Status] [ADT] Routine 


Cardiac Monitoring [RC] .As Directed 














- Assessment/Plan


Last 24 Hours: 


My Active Orders





20 14:26


Chest 1V Frontal [CR] Stat 





20 14:27


RT Aerosol Therapy [RC] ASDIRECTED 





20 14:30


EKG Documentation Completion [RC] ASDIRECTED 





20 16:22


Albuterol/Ipratropium [DuoNeb 3.0-0.5 MG/3 ML]   3 ml NEB Q4H PRN 





20 16:23


RT Aerosol Therapy [RC] ASDIRECTED 





20 17:16


Admission Status [Patient Status] [ADT] Routine 


Cardiac Monitoring [RC] .As Directed 











Plan: 





Patient much improved after 1 duoneb in the ED.  RR 20, O2 94% RA. 





Patient ambulated to BR, increased SOB, RR, WOB, orthopnea.  RR 30 O2 90% RA, 

lung sounds decreased throughout.  





Discussed with patient admission with lasix and nebulizers, she is agreeable to 

plan.

## 2020-09-21 VITALS — HEART RATE: 79 BPM

## 2020-09-21 VITALS — DIASTOLIC BLOOD PRESSURE: 53 MMHG | SYSTOLIC BLOOD PRESSURE: 112 MMHG

## 2020-09-21 RX ADMIN — MOMETASONE FUROATE AND FORMOTEROL FUMARATE DIHYDRATE SCH PUFF: 200; 5 AEROSOL RESPIRATORY (INHALATION) at 07:51

## 2020-09-21 NOTE — CR
Date of Service:  09/20/20

Clinical Data:  sob



AP CHEST:  



Comparison is made to a prior exam dated 06/02/20.  



The patient has taken a poor inspiration.  



The heart size is normal.  



The pulmonary vasculature appears prominent with mild cephalization of flow 
suggesting mild pulmonary venous congestion.  It may be partially accentuated by
the poor inspiration.  



There are mild interstitial changes in both lungs.  Interstitial edema should be
considered.  



The lungs are otherwise clear.  No pneumothorax.  



There is slight blunting of the right costophrenic angle suggesting a small 
right pleural effusion.  



[id number]

[facility]

## 2020-09-21 NOTE — PCM.DCSUM1
**Discharge Summary





- Discharge Data


Discharge Date: 09/21/20


Discharge Disposition: Home, Self-Care 01


Preliminary Cause of Death *Q: Cardiac Arrest


Condition: Fair





- Referral to Home Health


Primary Care Physician: 


Anuja Meyers RN








- Patient Instructions


Diet: Usual Diet as Tolerated


Activity: As Tolerated





- Discharge Plan


*PRESCRIPTION DRUG MONITORING PROGRAM REVIEWED*: Not Applicable


*COPY OF PRESCRIPTION DRUG MONITORING REPORT IN PATIENT JAE: Not Applicable


Prescriptions/Med Rec: 


Furosemide 40 mg PO DAILY #60


predniSONE [Prednisone] 10 mg PO ASDIRECTED #75 tablet


Home Medications: 


                                    Home Meds





Omeprazole 20 mg PO ACBREAKFAST 09/29/13 [History]


QUEtiapine [SEROquel] 100 mg PO QAM 11/21/15 [History]


Spironolactone [Aldactone] 3 tab PO BID 11/21/15 [History]


QUEtiapine Fumarate [Quetiapine Fumarate] 400 mg PO QPM 12/10/17 [History]


Calcium Carbonate/Vitamin D3 [Calcium 600 + Vit D 400 Softgl] 1 tab PO BID 

11/28/18 [History]


Ubidecarenone [Co Q-10] 200 mg PO DAILY 03/21/19 [History]


Mirtazapine [Remeron] 30 mg PO BEDTIME 07/12/19 [History]


Albuterol Sulfate [Proair Digihaler] 2 inh INH Q4HR PRN 07/06/20 [History]


Budesonide/Formoterol [Symbicort 160-4.5 MCG] 2 inh INH BID 07/06/20 [History]


Ipratropium/Albuterol Sulfate [Iprat-Albut 0.5-3(2.5) MG/3 ML] 1 ampule INH Q4HR

PRN 07/06/20 [History]


Pramipexole Di-HCl [Pramipexole Dihydrochloride] 0.25 mg PO DAILY 07/06/20 

[History]


Topiramate 25 mg PO BID 07/06/20 [History]


Varenicline [Chantix] 0.5 mg PO DAILY 07/06/20 [History]


Varenicline [Chantix] 1 mg PO DAILY 07/06/20 [History]


ondansetron HCL [Zofran] 4 mg PO Q4HR PRN 07/06/20 [History]


risperiDONE [Risperdal] 5 mg PO BEDTIME 07/22/20 [History]


Acetaminophen [Tylenol] 650 mg PO Q8HR 09/20/20 [History]


Aspirin [Halfprin] 81 mg PO DAILY 09/20/20 [History]


Losartan [Cozaar] 25 mg PO DAILY 09/20/20 [History]


Montelukast [Singulair] 10 mg PO DAILY 09/20/20 [History]


SUMAtriptan [Imitrex] 25 mg PO ASDIRECTED 09/20/20 [History]


amLODIPine Besylate [Norvasc] 2.5 mg PO DAILY 09/20/20 [History]


sitaGLIPtin Phos/Metformin HCl [Janumet 50-1,000 MG] 1 tab PO BID 09/20/20 

[History]


Albuterol [Ventolin HFA] 0 gm INH Q4HR PRN  inhaler 09/21/20 [Rx]


Albuterol/Ipratropium [DuoNeb 3.0-0.5 MG/3 ML] 3 ml NEB Q4H  neb 09/21/20 [Rx]


Furosemide 40 mg PO DAILY #60 09/21/20 [Rx]


SitaGLIPtin [Januvia] 50 mg PO DAILY  tablet 09/21/20 [Rx]


metFORMIN [Glucophage XR] 1,000 mg PO DAILY  tab.er 09/21/20 [Rx]


predniSONE [Prednisone] 10 mg PO ASDIRECTED #75 tablet 09/21/20 [Rx]








Patient Handouts:  Heart Failure, Self Care, Easy-to-Read, Pulmonary Edema, 

Easy-To-Read


Forms:  ED Department Discharge


Referrals: 


Anuja Meyers RN [Primary Care Provider] - 





- Discharge Summary/Plan Comment


DC Time >30 min.: No


Discharge Summary/Plan Comment: 


Counseled on medication use and management. Discussed prednisone taper and 

patient understands and has been on this in the past. Discussed side effects and

close f/u and rtc as directed. F/u with PCP as discussed. Patient to rtc and f/u

as needed. 





- General Info


Date of Service: 09/21/20


Functional Status: Reports: Pain Controlled, Tolerating Diet, Ambulating, 

Urinating





- Review of Systems


General: Reports: No Symptoms


HEENT: Reports: No Symptoms


Pulmonary: Reports: Shortness of Breath


Cardiovascular: Reports: No Symptoms


Gastrointestinal: Reports: No Symptoms


Genitourinary: Reports: No Symptoms


Musculoskeletal: Reports: No Symptoms


Skin: Reports: No Symptoms


Neurological: Reports: No Symptoms


Psychiatric: Reports: No Symptoms





- Patient Data


Vitals - Most Recent: 


                                Last Vital Signs











Temp  36.6 C   09/21/20 07:53


 


Pulse  79   09/21/20 07:53


 


Resp  18   09/21/20 07:53


 


BP  112/53 L  09/21/20 07:53


 


Pulse Ox  97   09/21/20 07:53











Weight - Most Recent: 109.769 kg


I&O - Last 24 hours: 


                                 Intake & Output











 09/20/20 09/21/20 09/21/20





 22:59 06:59 14:59


 


Intake Total 1000 700 


 


Balance 1000 700 











Lab Results - Last 24 hrs: 


                         Laboratory Results - last 24 hr











  09/20/20 09/20/20 09/20/20 Range/Units





  14:35 14:40 17:16 


 


WBC  4.7    (4.0-11.0)  K/uL


 


RBC  3.89    (3.80-5.80)  M/uL


 


Hgb  11.8    (11.5-16.5)  g/dL


 


Hct  35.7 L    (37.0-47.0)  %


 


MCV  92    (76-96)  fL


 


MCH  30.3    (27.0-32.0)  pg


 


MCHC  33.1    (31.0-35.0)  g/dL


 


RDW  13.3    (11.0-16.0)  %


 


Plt Count  155    (150-500)  K/uL


 


MPV  11.4 H    (6.0-10.0)  fL


 


Neut % (Auto)  71.6 H    (45.0-70.0)  %


 


Lymph % (Auto)  18.8 L    (20.0-40.0)  %


 


Mono % (Auto)  8.6    (3.0-10.0)  %


 


Eos % (Auto)  0.4 L    (1.0-5.0)  %


 


Baso % (Auto)  0.6 H    (0.0-0.5)  %


 


Neut # (Auto)  3.39    (2.00-7.50)  K/uL


 


Lymph # (Auto)  0.89 L    (1.50-4.00)  K/uL


 


Mono # (Auto)  0.41    (0.20-0.80)  K/uL


 


Eos # (Auto)  0.02 L    (0.04-0.40)  K/uL


 


Baso # (Auto)  0.03    (0.02-0.10)  K/uL


 


Sodium   140   (136-145)  mmol/L


 


Potassium   3.8   (3.5-5.1)  mmol/L


 


Chloride   104   ()  mmol/L


 


Carbon Dioxide   24.0   (21.0-32.0)  mmol/L


 


Anion Gap   15.8 H   (5.0-15.0)  mmol/L


 


BUN   12   (8-26)  mg/dL


 


Creatinine   1.12 H   (0.55-1.02)  mg/dL


 


Est Cr Clr Drug Dosing   TNP   


 


Estimated GFR (MDRD)   51 L   (>60)  MLS/MIN


 


BUN/Creatinine Ratio   10.7   (6-25)  


 


Glucose   275 H D   ()  mg/dL


 


POC Glucose     ()  mg/dL


 


Calcium   8.8   (8.5-10.1)  mg/dL


 


Total Bilirubin   0.3  D   (0.0-1.0)  mg/dL


 


AST   16   (15-37)  U/L


 


ALT   23   (12-78)  U/L


 


Alkaline Phosphatase   67   ()  U/L


 


B-Natriuretic Peptide   594 H   (0-125)  pg/mL


 


Total Protein   7.1   (6.4-8.2)  g/dL


 


Albumin   3.3 L   (3.4-5.0)  g/dL


 


Globulin   3.8   (2.2-4.2)  g/dL


 


Albumin/Globulin Ratio   0.9   (0.8-2.0)  


 


SARS CoV-2 RNA Rapid MAKAYLA    Negative  














  09/21/20 Range/Units





  07:44 


 


WBC   (4.0-11.0)  K/uL


 


RBC   (3.80-5.80)  M/uL


 


Hgb   (11.5-16.5)  g/dL


 


Hct   (37.0-47.0)  %


 


MCV   (76-96)  fL


 


MCH   (27.0-32.0)  pg


 


MCHC   (31.0-35.0)  g/dL


 


RDW   (11.0-16.0)  %


 


Plt Count   (150-500)  K/uL


 


MPV   (6.0-10.0)  fL


 


Neut % (Auto)   (45.0-70.0)  %


 


Lymph % (Auto)   (20.0-40.0)  %


 


Mono % (Auto)   (3.0-10.0)  %


 


Eos % (Auto)   (1.0-5.0)  %


 


Baso % (Auto)   (0.0-0.5)  %


 


Neut # (Auto)   (2.00-7.50)  K/uL


 


Lymph # (Auto)   (1.50-4.00)  K/uL


 


Mono # (Auto)   (0.20-0.80)  K/uL


 


Eos # (Auto)   (0.04-0.40)  K/uL


 


Baso # (Auto)   (0.02-0.10)  K/uL


 


Sodium   (136-145)  mmol/L


 


Potassium   (3.5-5.1)  mmol/L


 


Chloride   ()  mmol/L


 


Carbon Dioxide   (21.0-32.0)  mmol/L


 


Anion Gap   (5.0-15.0)  mmol/L


 


BUN   (8-26)  mg/dL


 


Creatinine   (0.55-1.02)  mg/dL


 


Est Cr Clr Drug Dosing   


 


Estimated GFR (MDRD)   (>60)  MLS/MIN


 


BUN/Creatinine Ratio   (6-25)  


 


Glucose   ()  mg/dL


 


POC Glucose  185 H  ()  mg/dL


 


Calcium   (8.5-10.1)  mg/dL


 


Total Bilirubin   (0.0-1.0)  mg/dL


 


AST   (15-37)  U/L


 


ALT   (12-78)  U/L


 


Alkaline Phosphatase   ()  U/L


 


B-Natriuretic Peptide   (0-125)  pg/mL


 


Total Protein   (6.4-8.2)  g/dL


 


Albumin   (3.4-5.0)  g/dL


 


Globulin   (2.2-4.2)  g/dL


 


Albumin/Globulin Ratio   (0.8-2.0)  


 


SARS CoV-2 RNA Rapid MAKAYLA   











Med Orders - Current: 


                               Current Medications





Acetaminophen (Tylenol)  650 mg PO Q8HR Y[facility]


   Last Admin: 09/21/20 05:26 Dose:  650 mg


   Documented by: 


Albuterol (Proventil Neb Soln)  2.5 mg NEB Q4H PRN


   PRN Reason: Shortness of Breath


Albuterol (Ventolin Hfa)  0 gm INH Q4HR PRN


   PRN Reason: Dyspnea


Albuterol/Ipratropium (Duoneb 3.0-0.5 Mg/3 Ml)  3 ml NEB Q4H [facility]


   Last Admin: 09/21/20 07:45 Dose:  3 ml


   Documented by: 


Albuterol/Ipratropium (Duoneb 3.0-0.5 Mg/3 Ml)  3 ml INH Q4HR PRN


   PRN Reason: Dyspnea


Amlodipine Besylate (Norvasc)  2.5 mg PO DAILY [facility]


   Last Admin: 09/21/20 07:50 Dose:  2.5 mg


   Documented by: 


Aspirin (Halfprin)  81 mg PO DAILY [facility]


   Last Admin: 09/21/20 07:48 Dose:  81 mg


   Documented by: 


Calcium Carbonate (Caltrate 600+D 1500 Mg-400 Units)  1 tab PO BID [facility]


   Last Admin: 09/21/20 07:48 Dose:  1 tab


   Documented by: 


Furosemide (Lasix)  20 mg PO DAILY@1200 [facility]


Losartan Potassium (Cozaar)  25 mg PO DAILY [facility]


   Last Admin: 09/21/20 07:51 Dose:  25 mg


   Documented by: 


Metformin HCl (Glucophage Xr)  1,000 mg PO DAILY [facility]


   Last Admin: 09/21/20 07:49 Dose:  1,000 mg


   Documented by: 


Mirtazapine (Remeron)  30 mg PO BEDTIME [facility]


   Last Admin: 09/20/20 20:40 Dose:  30 mg


   Documented by: 


Mometasone Furoate/Formoterol Fumar (Dulera 200-5 Mcg)  2 puff IH BID [facility]


   Last Admin: 09/21/20 07:51 Dose:  2 puff


   Documented by: 


Montelukast Sodium (Singulair)  10 mg PO DAILY [facility]


   Last Admin: 09/21/20 07:49 Dose:  10 mg


   Documented by: 


Varenicline [Chantix (] 0.5mg)  0.5 mg PO DAILY [facility]


Omeprazole (Omeprazole)  20 mg PO ACBREAKFAST [facility]


   Last Admin: 09/21/20 07:46 Dose:  20 mg


   Documented by: 


Ondansetron HCl (Zofran Odt)  4 mg PO Q4H PRN


   PRN Reason: Nausea


Pramipexole Dihydrochloride (Mirapex)  0.25 mg PO DAILY [facility]


   Last Admin: 09/21/20 07:49 Dose:  0.25 mg


   Documented by: 


Quetiapine Fumarate (Seroquel)  100 mg PO QAM [facility]


   Last Admin: 09/21/20 07:48 Dose:  100 mg


   Documented by: 


Quetiapine Fumarate (Quetiapine Fumarate)  400 mg PO QPM [facility]


   Last Admin: 09/20/20 20:41 Dose:  400 mg


   Documented by: 


Risperidone (Risperidal)  5 mg PO BEDTIME [facility]


   Last Admin: 09/20/20 20:41 Dose:  5 mg


   Documented by: 


Sitagliptin Phosphate (Januvia)  50 mg PO DAILY [facility]


   Last Admin: 09/21/20 07:52 Dose:  Not Given


   Documented by: 


Spironolactone (Aldactone)  75 mg PO BID@0800,1200 [facility]


   Last Admin: 09/21/20 07:51 Dose:  75 mg


   Documented by: 


Sumatriptan Succinate (Imitrex)  25 mg PO ASDIRECTED [facility]


Topiramate (Topamax)  25 mg PO BID [facility]


   Last Admin: 09/21/20 07:52 Dose:  25 mg


   Documented by: 





Discontinued Medications





Albuterol/Ipratropium (Duoneb 3.0-0.5 Mg/3 Ml)  3 ml NEB Q4H PRN


   PRN Reason: Shortness of Breath


   Last Admin: 09/20/20 14:40 Dose:  3 ml


   Documented by: 


Albuterol/Ipratropium (Duoneb 3.0-0.5 Mg/3 Ml)  3 ml NEB Q4H PRN


   PRN Reason: Shortness of Breath


   Last Admin: 09/20/20 16:30 Dose:  3 ml


   Documented by: 


Furosemide (Lasix)  40 mg IVPUSH NOW ONE


   Stop: 09/20/20 16:23


   Last Admin: 09/20/20 17:04 Dose:  40 mg


   Documented by: 


Furosemide (Lasix) Confirm Administered Dose 40 mg .ROUTE .STK-MED ONE


   Stop: 09/20/20 17:13


   Last Admin: 09/20/20 17:05 Dose:  Not Given


   Documented by: 


Lorazepam (Ativan)  1 mg PO ONETIME ONE


   Stop: 09/20/20 19:05


   Last Admin: 09/20/20 19:52 Dose:  1 mg


   Documented by: 


Methylprednisolone Sodium Succinate (Solu-Medrol)  125 mg IVPUSH ONETIME ONE


   Stop: 09/20/20 14:27


   Last Admin: 09/20/20 15:01 Dose:  125 mg


   Documented by: 


Non-Formulary Medication (Sitagliptin Phos/Metformin Hcl [Janumet 50-1,000 Mg]) 

1 tab PO BID [facility]


   Last Admin: 09/21/20 07:23 Dose:  Not Given


   Documented by: 











- Exam


General: Reports: Alert, Oriented, Cooperative


HEENT: Reports: Pupils Equal, Pupils Reactive, EOMI


Neck: Reports: Supple


Lungs: Reports: Normal Respiratory Effort, Rhonchi


Cardiovascular: Reports: Regular Rate, Regular Rhythm


GI/Abdominal Exam: Normal Bowel Sounds, Soft, Non-Tender


Back Exam: Reports: Normal Inspection


Skin: Reports: Warm, Dry, Intact


Neurological: Reports: No New Focal Deficit

## 2020-09-23 ENCOUNTER — HOSPITAL ENCOUNTER (EMERGENCY)
Dept: HOSPITAL 60 - LB.ED | Age: 55
LOS: 1 days | Discharge: HOME | End: 2020-09-24
Payer: MEDICARE

## 2020-09-23 DIAGNOSIS — R47.81: Primary | ICD-10-CM

## 2020-09-23 DIAGNOSIS — J44.9: ICD-10-CM

## 2020-09-24 VITALS — DIASTOLIC BLOOD PRESSURE: 80 MMHG | SYSTOLIC BLOOD PRESSURE: 168 MMHG | HEART RATE: 90 BPM

## 2020-09-24 NOTE — CT
Date of Service:  09/23/20

Clinical Data:  slurred speech.



UNENHANCED BRAIN CT:  



Multislice axial acquisition without IV contrast was performed.  



Comparison is made to a prior exam dated 02/23/20.  



There is mild atrophy.  There are periventricular lucencies bilaterally 
consistent with small vessel ischemic change.  



No masses or mass effect.  No intracranial hemorrhage.  No evidence of acute or 
subacute infarct.  



No osseous abnormalities.  



IMPRESSION:  No acute intracranial abnormalities.  



[id number]

[facility]

## 2020-09-24 NOTE — ER
HISTORY OF PRESENT ILLNESS:  55-year-old female who comes in by ambulance.  She states that

she woke up this afternoon about 4 p.m. from a nap and her speech was slurred.  She states

that she did not feel right.  She was able to move her arms and legs, and she feels that she

is improving already.  The patient is not feeling sick.  She is not having any problems with

pain.  No trouble with chest pain or trouble breathing, and she has not had any falls or

injuries.

 

PAST MEDICAL HISTORY:  Extensive that does include:

1. COPD.

2. Cognitive changes.

 

OBJECTIVE:  GENERAL APPEARANCE:  The patient is awake and alert.  She is able to talk fairly

clearly.  She does not have any teeth present and this is affecting her speech slightly.

VITAL SIGNS:  Reviewed, they are normal.  HEENT:  Eyes, pupils equal, round, and reactive to

light.  EOMs are grossly intact.  Oral mucous membranes are slightly dry.  Tonsils are not

enlarged or injected.  Pharynx not inflamed.  NEUROLOGIC:  Cranial nerves 2 through 12 are

intact today.  EXTREMITIES:  The patient has full range of motion of all 4 extremities.

NECK:  Supple.  LUNGS:  Clear.  CARDIAC:  Heart sounds distinct without murmurs.  ABDOMEN:

Soft, protuberant, nontender to palpation.  Bowel sounds are present.

 

LABORATORY DATA:  Labs include a CBC showing a slightly elevated white count of 13,000.

Comprehensive metabolic panel is unremarkable, blood glucose is 222.  BNP is 383.  Alcohol

is negative.

 

DIAGNOSIS:  Slurred speech, currently resolved.

 

TREATMENT PLAN:  I had a discussion with the patient.  She has been sleeping in our

emergency room.  She wakes up easily and she states that she feels fine.  She would like to

go home and I feel this is reasonable.  She is asymptomatic at this time.  She is to go home

with her son and rest tonight.  Activity should be as tolerated, and follow up should be

with her primary care provider over the next day or 2 for a recheck.

 

 

CRS/MODL

DD:  09/23/2020 23:02:37

DT:  09/24/2020 02:52:18

Job #:  [id number]

## 2020-10-11 ENCOUNTER — HOSPITAL ENCOUNTER (EMERGENCY)
Dept: HOSPITAL 60 - LB.ED | Age: 55
Discharge: HOME | End: 2020-10-11
Payer: MEDICARE

## 2020-10-11 VITALS — SYSTOLIC BLOOD PRESSURE: 118 MMHG | HEART RATE: 92 BPM | DIASTOLIC BLOOD PRESSURE: 58 MMHG

## 2020-10-11 DIAGNOSIS — I10: ICD-10-CM

## 2020-10-11 DIAGNOSIS — F32.9: ICD-10-CM

## 2020-10-11 DIAGNOSIS — G62.9: ICD-10-CM

## 2020-10-11 DIAGNOSIS — S76.011A: Primary | ICD-10-CM

## 2020-10-11 DIAGNOSIS — K21.9: ICD-10-CM

## 2020-10-11 DIAGNOSIS — F17.210: ICD-10-CM

## 2020-10-11 DIAGNOSIS — J45.909: ICD-10-CM

## 2020-10-11 DIAGNOSIS — F41.9: ICD-10-CM

## 2020-10-11 DIAGNOSIS — Z79.84: ICD-10-CM

## 2020-10-11 DIAGNOSIS — Z88.8: ICD-10-CM

## 2020-10-11 DIAGNOSIS — Z88.4: ICD-10-CM

## 2020-10-11 DIAGNOSIS — Z91.048: ICD-10-CM

## 2020-10-11 DIAGNOSIS — E11.42: ICD-10-CM

## 2020-10-11 DIAGNOSIS — Z79.899: ICD-10-CM

## 2020-10-11 DIAGNOSIS — E66.9: ICD-10-CM

## 2020-10-11 DIAGNOSIS — Z79.82: ICD-10-CM
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
## 2020-10-11 NOTE — CR
DATE OF SERVICE:  10/11/20 

CLINICAL DATA: right hip pain



PELVIS AND RIGHT HIP:

Minimal osteoarthritic changes.



Mild degenerative changes involving the SI joints and symphysis pubis. 



No acute abnormalities. No lytic or blastic bone lesions. 



[id number]

[facility]

## 2020-10-11 NOTE — EDM.PDOC
ED HPI GENERAL MEDICAL PROBLEM





- General


Chief Complaint: General


Stated Complaint: bilat hip pain


Time Seen by Provider: 10/11/20 11:30


Source of Information: Reports: Patient


History Limitations: Reports: No Limitations





- History of Present Illness


INITIAL COMMENTS - FREE TEXT/NARRATIVE: 





Patient is a 54 y/o female who presents with right hip pain x 1 day.  No trauma 

and no injury.  She has chronic left hip pain, but never on the right side. 

Getting out of the bed and walking make the pain worse.  Pain is sharp and 

radiates down the lateral side of her right thigh.  No numbness/tingling. 


  ** Right Hip


Pain Score (Numeric/FACES): 8





- Related Data


                                    Allergies











Allergy/AdvReac Type Severity Reaction Status Date / Time


 


adhesive tape Allergy  Rash Verified 10/11/20 11:21


 


celecoxib [From Celebrex] Allergy  Nausea and Verified 10/11/20 11:21





   Vomiting  


 


ketamine AdvReac Intermediate Agitation Verified 10/11/20 11:21


 


ropinirole AdvReac  Diaphoresis Verified 10/11/20 11:21











Home Meds: 


                                    Home Meds





Omeprazole 20 mg PO ACBREAKFAST 13 [History]


QUEtiapine [SEROquel] 100 mg PO QAM 11/21/15 [History]


Spironolactone [Aldactone] 3 tab PO BID 11/21/15 [History]


QUEtiapine Fumarate [Quetiapine Fumarate] 400 mg PO QPM 12/10/17 [History]


Calcium Carbonate/Vitamin D3 [Calcium 600 + Vit D 400 Softgl] 1 tab PO BID 

18 [History]


Ubidecarenone [Co Q-10] 200 mg PO DAILY 19 [History]


Mirtazapine [Remeron] 30 mg PO BEDTIME 19 [History]


Albuterol Sulfate [Proair Digihaler] 2 inh INH Q4HR PRN 20 [History]


Budesonide/Formoterol [Symbicort 160-4.5 MCG] 2 inh INH BID 20 [History]


Ipratropium/Albuterol Sulfate [Iprat-Albut 0.5-3(2.5) MG/3 ML] 1 ampule INH Q4HR

PRN 20 [History]


Pramipexole Di-HCl [Pramipexole Dihydrochloride] 0.25 mg PO DAILY 20 

[History]


Topiramate 25 mg PO BID 20 [History]


Varenicline [Chantix] 0.5 mg PO DAILY 20 [History]


Varenicline [Chantix] 1 mg PO DAILY 20 [History]


ondansetron HCL [Zofran] 4 mg PO Q4HR PRN 20 [History]


risperiDONE [Risperdal] 5 mg PO BEDTIME 20 [History]


Acetaminophen [Tylenol] 650 mg PO Q8HR 20 [History]


Aspirin [Halfprin] 81 mg PO DAILY 20 [History]


Losartan [Cozaar] 25 mg PO DAILY 20 [History]


Montelukast [Singulair] 10 mg PO DAILY 20 [History]


SUMAtriptan [Imitrex] 25 mg PO ASDIRECTED 20 [History]


amLODIPine Besylate [Norvasc] 2.5 mg PO DAILY 20 [History]


sitaGLIPtin Phos/Metformin HCl [Janumet 50-1,000 MG] 1 tab PO BID 20 

[History]


Albuterol [Ventolin HFA] 0 gm INH Q4HR PRN  inhaler 20 [Rx]


Albuterol/Ipratropium [DuoNeb 3.0-0.5 MG/3 ML] 3 ml NEB Q4H  neb 20 [Rx]


Furosemide 40 mg PO DAILY #60 20 [Rx]


SitaGLIPtin [Januvia] 50 mg PO DAILY  tablet 20 [Rx]


metFORMIN [Glucophage XR] 1,000 mg PO DAILY  tab.er 20 [Rx]


predniSONE [Prednisone] 10 mg PO ASDIRECTED #75 tablet 20 [Rx]











Past Medical History





- Past Health History


Medical/Surgical History: Denies Medical/Surgical History


HEENT History: Reports: Impaired Vision, Other (See Below)


Other HEENT History: glasses for reading,


Cardiovascular History: Reports: Hypertension


Respiratory History: Reports: Asthma, Bronchitis, Recurrent, Pneumonia, 

Recurrent, Other (See Below)


Other Respiratory History: Emphysema


Gastrointestinal History: Reports: Bowel Obstruction, GERD, Irritable Bowel 

Syndrome


Genitourinary History: Reports: None


OB/GYN History: Reports: Dysfunctional Uterine Bleeding, Pregnancy


Musculoskeletal History: Reports: Back Pain, Chronic


Other Musculoskeletal History: back / butt pain, shoots down left leg


Neurological History: Reports: Migraines, Neuropathy, Peripheral


Psychiatric History: Reports: Anxiety, Depression, Suicide Attempt, Suicidal 

Ideation


Endocrine/Metabolic History: Reports: Diabetes, Type II, Obesity/BMI 30+


Hematologic History: Reports: Blood Transfusion(s), Iron Deficiency


Dermatologic History: Reports: Eczema, Other (See Below)


Other Dermatologic History: Allergy to paper tape





- Infectious Disease History


Infectious Disease History: Reports: Chicken Pox, Measles, Rubella





- Past Surgical History


GI Surgical History: Reports: None


Female  Surgical History: Reports:  Section, Hysterectomy, Other (See 

Below)


Other Female  Surgeries/Procedures: appendix


Musculoskeletal Surgical History: Reports: None





Social & Family History





- Family History


Family Medical History: Noncontributory


Cardiac: Reports: None


Musculoskeletal: Reports: Arthritis, Back pain, Chronic


Neurological: Reports: None


Psychiatric: Reports: Depression


Endocrine/Metabolic: Reports: None


Oncologic: Reports: None





- Tobacco Use


Smoking Status *Q: Current Every Day Smoker


Years of Tobacco use: 30


Packs/Tins Daily: 0.2





- Caffeine Use


Caffeine Use: Reports: Coffee, Soda


Other Caffeine Use: 2 cups a day


Caffeine Use Comment: 2 cups of coffee per day 1 soda per day





- Recreational Drug Use


Recreational Drug Use: No





ED ROS GENERAL





- Review of Systems


Review Of Systems: See Below


Constitutional: Reports: No Symptoms


HEENT: Reports: No Symptoms


Respiratory: Reports: No Symptoms


Cardiovascular: Reports: No Symptoms


GI/Abdominal: Reports: No Symptoms


: Reports: No Symptoms


Musculoskeletal: Reports: Other (right hip pain)


Skin: Reports: No Symptoms


Neurological: Reports: No Symptoms


Psychiatric: Reports: No Symptoms





ED EXAM, GENERAL





- Physical Exam


Exam: See Below


Exam Limited By: No Limitations


General Appearance: Alert, No Apparent Distress


Head: Atraumatic, Normocephalic


Respiratory/Chest: No Respiratory Distress, No Accessory Muscle Use


Extremities: Normal Inspection, Normal Range of Motion, Non-Tender, No Pedal 

Edema, Normal Capillary Refill, Other (Tenderness to right lateral hip; able to 

ambulate and bear weight)


Psychiatric: Normal Affect


Skin Exam: Warm, Dry, Intact, Normal Color, No Rash





Course





- Vital Signs


Text/Narrative:: 





Toradol 60 IM given with improvement in pain.  Will send home with baclofen and 

have patient follow up with Dr. Portillo.  


Last Recorded V/S: 





                                Last Vital Signs











Temp  36.4 C   10/11/20 11:22


 


Pulse  92   10/11/20 11:22


 


Resp  18   10/11/20 11:22


 


BP  118/58 L  10/11/20 11:22


 


Pulse Ox  96   10/11/20 11:22














- Orders/Labs/Meds


Orders: 





                               Active Orders 24 hr











 Category Date Time Status


 


 Hip Min 2V or 3V w Pelvis Rt [CR] Stat Exams  10/11/20 11:30 Taken











Meds: 





Medications














Discontinued Medications














Generic Name Dose Route Start Last Admin





  Trade Name Freq  PRN Reason Stop Dose Admin


 


Ketorolac Tromethamine  60 mg  10/11/20 11:33 





  Toradol  IM  10/11/20 11:34 





  ONETIME ONE  


 


Ketorolac Tromethamine  Confirm  10/11/20 11:41 





  Toradol  Administered  10/11/20 11:42 





  Dose  





  60 mg  





  .ROUTE  





  .STK-MED ONE  














Departure





- Departure


Time of Disposition: 12:30


Disposition: Home, Self-Care 01


Condition: Good


Clinical Impression: 


Strain of right hip


Qualifiers:


 Encounter type: initial encounter Qualified Code(s): S76.011A - Strain of 

muscle, fascia and tendon of right hip, initial encounter








- Discharge Information


*PRESCRIPTION DRUG MONITORING PROGRAM REVIEWED*: Not Applicable


*COPY OF PRESCRIPTION DRUG MONITORING REPORT IN PATIENT JAE: Not Applicable


Instructions:  Muscle Strain


Referrals: 


PCP,None [Primary Care Provider] - 





Sepsis Event Note (ED)





- Evaluation


Sepsis Screening Result: No Definite Risk





- Focused Exam


Vital Signs: 





                                   Vital Signs











  Temp Pulse Resp BP Pulse Ox


 


 10/11/20 11:22  36.4 C  92  18  118/58 L  96


 


 10/11/20 11:11  36.6 C  92  18  118/58 L  96














- My Orders


Last 24 Hours: 





My Active Orders





10/11/20 11:30


Hip Min 2V or 3V w Pelvis Rt [CR] Stat 














- Assessment/Plan


Last 24 Hours: 





My Active Orders





10/11/20 11:30


Hip Min 2V or 3V w Pelvis Rt [CR] Stat

## 2020-10-18 ENCOUNTER — HOSPITAL ENCOUNTER (EMERGENCY)
Dept: HOSPITAL 60 - LB.ED | Age: 55
Discharge: HOME | End: 2020-10-18
Payer: MEDICARE

## 2020-10-18 VITALS — DIASTOLIC BLOOD PRESSURE: 69 MMHG | HEART RATE: 78 BPM | SYSTOLIC BLOOD PRESSURE: 129 MMHG

## 2020-10-18 DIAGNOSIS — F32.9: ICD-10-CM

## 2020-10-18 DIAGNOSIS — G62.9: ICD-10-CM

## 2020-10-18 DIAGNOSIS — R20.2: ICD-10-CM

## 2020-10-18 DIAGNOSIS — R07.89: Primary | ICD-10-CM

## 2020-10-18 DIAGNOSIS — Z88.8: ICD-10-CM

## 2020-10-18 DIAGNOSIS — Z88.4: ICD-10-CM

## 2020-10-18 DIAGNOSIS — F41.9: ICD-10-CM

## 2020-10-18 DIAGNOSIS — J45.909: ICD-10-CM

## 2020-10-18 DIAGNOSIS — Z91.09: ICD-10-CM

## 2020-10-18 DIAGNOSIS — Z90.710: ICD-10-CM

## 2020-10-18 DIAGNOSIS — M79.602: ICD-10-CM

## 2020-10-18 DIAGNOSIS — R06.02: ICD-10-CM

## 2020-10-18 DIAGNOSIS — I10: ICD-10-CM

## 2020-10-18 DIAGNOSIS — Z79.899: ICD-10-CM

## 2020-10-18 DIAGNOSIS — E66.9: ICD-10-CM

## 2020-10-18 NOTE — EDM.PDOC
ED HPI GENERAL MEDICAL PROBLEM





- General


Chief Complaint: General


Stated Complaint: ARM ACHING


Time Seen by Provider: 10/18/20 15:35


Source of Information: Reports: Patient


History Limitations: Reports: No Limitations





- History of Present Illness


INITIAL COMMENTS - FREE TEXT/NARRATIVE: 


The patient is a 55-year-old white female with history of asthma and anxiety who

presents this afternoon with complaint of left arm aching, bilateral tingling in

her hands and feet with some chest pressure and shortness of breath.  Patient 

has history of similar symptoms in the past.  She was here 1 month ago with 

anxiety and shortness of breath.  Symptoms started this morning and patient was 

concerned that it may be a problem with her heart.  She rates the pain 5/10 and 

describes it as a pressure type pain in her chest no exacerbating or relieving 

factors have been noted by the patient. 





Onset: Today


Onset Date: 10/18/20


Onset Time: 08:00


Location: Reports: Other (Pressure type pain in the chest with associated mild 

shortness of breath.  Dull ache in left arm)


Associated Symptoms: Reports: Shortness of Breath





- Related Data


                                    Allergies











Allergy/AdvReac Type Severity Reaction Status Date / Time


 


adhesive tape Allergy  Rash Verified 10/11/20 11:21


 


celecoxib [From Celebrex] Allergy  Nausea and Verified 10/11/20 11:21





   Vomiting  


 


ketamine AdvReac Intermediate Agitation Verified 10/11/20 11:21


 


ropinirole AdvReac  Diaphoresis Verified 10/11/20 11:21











Home Meds: 


                                    Home Meds





Omeprazole 20 mg PO ACBREAKFAST 13 [History]


QUEtiapine [SEROquel] 100 mg PO QAM 11/21/15 [History]


Spironolactone [Aldactone] 3 tab PO BID 11/21/15 [History]


QUEtiapine Fumarate [Quetiapine Fumarate] 400 mg PO QPM 12/10/17 [History]


Calcium Carbonate/Vitamin D3 [Calcium 600 + Vit D 400 Softgl] 1 tab PO BID 

18 [History]


Ubidecarenone [Co Q-10] 200 mg PO DAILY 19 [History]


Mirtazapine [Remeron] 30 mg PO BEDTIME 19 [History]


Albuterol Sulfate [Proair Digihaler] 2 inh INH Q4HR PRN 20 [History]


Budesonide/Formoterol [Symbicort 160-4.5 MCG] 2 inh INH BID 20 [History]


Ipratropium/Albuterol Sulfate [Iprat-Albut 0.5-3(2.5) MG/3 ML] 1 ampule INH Q4HR

PRN 20 [History]


Pramipexole Di-HCl [Pramipexole Dihydrochloride] 0.25 mg PO DAILY 20 

[History]


Topiramate 25 mg PO BID 20 [History]


Varenicline [Chantix] 0.5 mg PO DAILY 20 [History]


Varenicline [Chantix] 1 mg PO DAILY 20 [History]


ondansetron HCL [Zofran] 4 mg PO Q4HR PRN 20 [History]


risperiDONE [Risperdal] 5 mg PO BEDTIME 20 [History]


Acetaminophen [Tylenol] 650 mg PO Q8HR 20 [History]


Aspirin [Halfprin] 81 mg PO DAILY 20 [History]


Losartan [Cozaar] 25 mg PO DAILY 20 [History]


Montelukast [Singulair] 10 mg PO DAILY 20 [History]


SUMAtriptan [Imitrex] 25 mg PO ASDIRECTED 20 [History]


amLODIPine Besylate [Norvasc] 2.5 mg PO DAILY 20 [History]


sitaGLIPtin Phos/Metformin HCl [Janumet 50-1,000 MG] 1 tab PO BID 20 

[History]


Albuterol [Ventolin HFA] 0 gm INH Q4HR PRN  inhaler 20 [Rx]


Albuterol/Ipratropium [DuoNeb 3.0-0.5 MG/3 ML] 3 ml NEB Q4H  neb 20 [Rx]


Furosemide 40 mg PO DAILY #60 20 [Rx]


SitaGLIPtin [Januvia] 50 mg PO DAILY  tablet 20 [Rx]


metFORMIN [Glucophage XR] 1,000 mg PO DAILY  tab.er 20 [Rx]


predniSONE [Prednisone] 10 mg PO ASDIRECTED #75 tablet 20 [Rx]











Past Medical History





- Past Health History


Medical/Surgical History: Denies Medical/Surgical History


HEENT History: Reports: Impaired Vision, Other (See Below)


Other HEENT History: glasses for reading,


Cardiovascular History: Reports: Hypertension


Respiratory History: Reports: Asthma, Bronchitis, Recurrent, Pneumonia, 

Recurrent, Other (See Below)


Other Respiratory History: Emphysema


Gastrointestinal History: Reports: Bowel Obstruction, GERD, Irritable Bowel 

Syndrome


Genitourinary History: Reports: None


OB/GYN History: Reports: Dysfunctional Uterine Bleeding, Pregnancy


Musculoskeletal History: Reports: Back Pain, Chronic


Other Musculoskeletal History: back / butt pain, shoots down left leg


Neurological History: Reports: Migraines, Neuropathy, Peripheral


Psychiatric History: Reports: Anxiety, Depression, Suicide Attempt, Suicidal 

Ideation


Endocrine/Metabolic History: Reports: Diabetes, Type II, Obesity/BMI 30+


Hematologic History: Reports: Blood Transfusion(s), Iron Deficiency


Dermatologic History: Reports: Eczema, Other (See Below)


Other Dermatologic History: Allergy to paper tape





- Infectious Disease History


Infectious Disease History: Reports: Chicken Pox, Measles, Rubella





- Past Surgical History


GI Surgical History: Reports: None


Female  Surgical History: Reports:  Section, Hysterectomy, Other (See 

Below)


Other Female  Surgeries/Procedures: appendix


Musculoskeletal Surgical History: Reports: None





Social & Family History





- Family History


Family Medical History: Noncontributory


Cardiac: Reports: None


Musculoskeletal: Reports: Arthritis, Back pain, Chronic


Neurological: Reports: None


Psychiatric: Reports: Depression


Endocrine/Metabolic: Reports: None


Oncologic: Reports: None





- Caffeine Use


Caffeine Use: Reports: Coffee, Soda


Other Caffeine Use: 2 cups a day


Caffeine Use Comment: 2 cups of coffee per day 1 soda per day





ED ROS GENERAL





- Review of Systems


Review Of Systems: See Below


Constitutional: Denies: Fever, Chills, Weakness, Diaphoresis


HEENT: Reports: No Symptoms


Respiratory: Reports: Shortness of Breath.  Denies: Wheezing, Pleuritic Chest 

Pain


Cardiovascular: Reports: Chest Pain, Other (Pressure type)


GI/Abdominal: Reports: Nausea.  Denies: Abdominal Pain, Anorexia, Vomiting


: Denies: Dysuria, Flank Pain, Frequency, Hematuria


Musculoskeletal: Reports: Other (Tingling in both hands and feet and dull ache 

in left arm)


Neurological: Reports: Tingling (in feet).  Denies: Headache


Psychiatric: Reports: Anxiety





ED EXAM, GENERAL





- Physical Exam


Exam: See Below


Exam Limited By: No Limitations


General Appearance: Alert (Yeah), WD/WN, Obese, Other (Mildly anxious)


Ears: Normal External Exam, Hearing Grossly Normal


Nose: Normal Inspection, Normal Mucosa


Throat/Mouth: Normal Inspection, Normal Lips, Normal Teeth, Normal Gums, Normal 

Oropharynx, Normal Voice, No Airway Compromise


Head: Atraumatic, Normocephalic


Neck: Normal Inspection, Supple, Non-Tender, Full Range of Motion, Other (No 

JVD)


Respiratory/Chest: No Respiratory Distress, Lungs Clear, Normal Breath Sounds


Cardiovascular: Regular Rate, Rhythm, No Murmur


GI/Abdominal: Soft, Non-Tender.  No: Guarding, Rebound, Mass


 (Female) Exam: Deferred


Back Exam: Normal Inspection, Full Range of Motion.  No: CVA Tenderness (R), CVA

 Tenderness (L)


Extremities: Normal Inspection, Normal Range of Motion, Non-Tender, No Pedal 

Edema


Neurological: Alert, Oriented, Normal Cognition


Psychiatric: Normal Affect, Normal Mood


Skin Exam: Warm, Dry, No Rash


Lymphatic: No Adenopathy


  ** #1 Interpretation


EKG Date: 10/18/20


Time: 15:35


Rhythm: NSR


Rate (Beats/Min): 76


Axis: Normal


P-Wave: Present


QRS: Normal


ST-T: Normal


QT: Normal


EKG Interpretation Comments: 





Normal sinus rhythm without infarct or ischemia by my reading





Course





- Vital Signs


Text/Narrative:: 





Patient had initial evaluation and labs ordered. 


Last Recorded V/S: 


                                Last Vital Signs











Temp  207.7 F H  10/18/20 15:30


 


Pulse  78   10/18/20 15:30


 


Resp      


 


BP  129/69   10/18/20 15:30


 


Pulse Ox      














- Orders/Labs/Meds


Orders: 


                               Active Orders 24 hr











 Category Date Time Status


 


 Cardiac Monitoring [RC] .As Directed Care  10/18/20 15:39 Active


 


 EKG Documentation Completion [RC] ASDIRECTED Care  10/18/20 15:38 Active











Labs: 


                                Laboratory Tests











  10/18/20 10/18/20 Range/Units





  16:00 16:18 


 


WBC  5.2   (4.0-11.0)  K/uL


 


RBC  3.96   (3.80-5.80)  M/uL


 


Hgb  11.9   (11.5-16.5)  g/dL


 


Hct  36.2 L   (37.0-47.0)  %


 


MCV  91   (76-96)  fL


 


MCH  30.1   (27.0-32.0)  pg


 


MCHC  32.9   (31.0-35.0)  g/dL


 


RDW  13.4   (11.0-16.0)  %


 


Plt Count  244   (150-500)  K/uL


 


MPV  10.4 H   (6.0-10.0)  fL


 


Neut % (Auto)  53.6   (45.0-70.0)  %


 


Lymph % (Auto)  35.7   (20.0-40.0)  %


 


Mono % (Auto)  9.3   (3.0-10.0)  %


 


Eos % (Auto)  1.0   (1.0-5.0)  %


 


Baso % (Auto)  0.4   (0.0-0.5)  %


 


Neut # (Auto)  2.78   (2.00-7.50)  K/uL


 


Lymph # (Auto)  1.85   (1.50-4.00)  K/uL


 


Mono # (Auto)  0.48   (0.20-0.80)  K/uL


 


Eos # (Auto)  0.05   (0.04-0.40)  K/uL


 


Baso # (Auto)  0.02   (0.02-0.10)  K/uL


 


Sodium   137  (136-145)  mmol/L


 


Potassium   3.4 L  (3.5-5.1)  mmol/L


 


Chloride   100  ()  mmol/L


 


Carbon Dioxide   26.5  (21.0-32.0)  mmol/L


 


Anion Gap   13.9  (5.0-15.0)  mmol/L


 


BUN   17  (8-26)  mg/dL


 


Creatinine   1.10 H  (0.55-1.02)  mg/dL


 


Est Cr Clr Drug Dosing   TNP  


 


Estimated GFR (MDRD)   52 L  (>60)  MLS/MIN


 


BUN/Creatinine Ratio   15.5  (6-25)  


 


Glucose   112 H  ()  mg/dL


 


Calcium   7.8 L  (8.5-10.1)  mg/dL


 


Troponin I   < 0.017  (0.000-0.060)  ng/mL














Departure





- Departure


Time of Disposition: 16:52


Disposition: Home, Self-Care 01


Clinical Impression: 


 Chest pain in adult








- Discharge Information


*PRESCRIPTION DRUG MONITORING PROGRAM REVIEWED*: Not Applicable


*COPY OF PRESCRIPTION DRUG MONITORING REPORT IN PATIENT JAE: Not Applicable


Instructions:  Nonspecific Chest Pain, Adult, Easy-to-Read


Referrals: 


PCP,None [Primary Care Provider] - 


Forms:  ED Department Discharge


Additional Instructions: 


Return for worsenng  chest pain or SOB or any new symptoms as needed


Follow up later this week in clinic for further evaluation


NO change in your medications at this time





Sepsis Event Note (ED)





- Focused Exam


Vital Signs: 


                                   Vital Signs











  Temp Pulse BP


 


 10/18/20 15:30  207.7 F H  78  129/69














- My Orders


Last 24 Hours: 


My Active Orders





10/18/20 15:38


EKG Documentation Completion [RC] ASDIRECTED 





10/18/20 15:39


Cardiac Monitoring [RC] .As Directed 














- Assessment/Plan


Last 24 Hours: 


My Active Orders





10/18/20 15:38


EKG Documentation Completion [RC] ASDIRECTED 





10/18/20 15:39


Cardiac Monitoring [RC] .As Directed

## 2020-11-12 ENCOUNTER — HOSPITAL ENCOUNTER (EMERGENCY)
Dept: HOSPITAL 60 - LB.ED | Age: 55
Discharge: HOME | End: 2020-11-12
Payer: MEDICARE

## 2020-11-12 VITALS — SYSTOLIC BLOOD PRESSURE: 118 MMHG | DIASTOLIC BLOOD PRESSURE: 59 MMHG | HEART RATE: 91 BPM

## 2020-11-12 DIAGNOSIS — Z79.899: ICD-10-CM

## 2020-11-12 DIAGNOSIS — K59.00: Primary | ICD-10-CM

## 2020-11-12 DIAGNOSIS — Z88.8: ICD-10-CM

## 2020-11-12 DIAGNOSIS — I10: ICD-10-CM

## 2020-11-12 DIAGNOSIS — E66.9: ICD-10-CM

## 2020-11-12 DIAGNOSIS — F41.9: ICD-10-CM

## 2020-11-12 DIAGNOSIS — J45.909: ICD-10-CM

## 2020-11-12 DIAGNOSIS — K21.9: ICD-10-CM

## 2020-11-12 DIAGNOSIS — F32.9: ICD-10-CM

## 2020-11-12 DIAGNOSIS — E11.42: ICD-10-CM

## 2020-11-12 DIAGNOSIS — Z91.048: ICD-10-CM

## 2020-11-12 NOTE — EDM.PDOC
ED HPI GENERAL MEDICAL PROBLEM





- General


Chief Complaint: General


Stated Complaint: LUQ PAIN


Time Seen by Provider: 20 13:45


Source of Information: Reports: Patient, Other (Dr. Stokes)


History Limitations: Reports: No Limitations





- History of Present Illness


INITIAL COMMENTS - FREE TEXT/NARRATIVE: 





Patient is a 54 y/o female who presents with left upper quadrant abdominal pain 

that has been going on for "awhile."  The last 3 days it has progressed from 

cramping to sharp, constant pain that is worse with laying down, coughing, and 

after eating.  She had an episode of watery diarrhea this morning.  Patient was 

seen by Dr. Stokes in outpatient and had some labs done, which were unremarkable 

for pancreatitis, infection, UTI, or cardiopulmonary.  PMHx significant for 

small bowel obstruction, colonic obstruction, gastritis, IBS, and abdominal 

surgeries (cholecystitis and surgical adhesion removal).  Patient denies bloody 

or dark stools, N/V, fever, dysuria, or difficulty breathing.  


  ** Left Abdomen


Pain Score (Numeric/FACES): 8





- Related Data


                                    Allergies











Allergy/AdvReac Type Severity Reaction Status Date / Time


 


adhesive tape Allergy  Rash Verified 10/11/20 11:21


 


celecoxib [From Celebrex] Allergy  Nausea and Verified 10/11/20 11:21





   Vomiting  


 


ketamine AdvReac Intermediate Agitation Verified 10/11/20 11:21


 


ropinirole AdvReac  Diaphoresis Verified 10/11/20 11:21











Home Meds: 


                                    Home Meds





Omeprazole 20 mg PO ACBREAKFAST 13 [History]


QUEtiapine [SEROquel] 100 mg PO QAM 11/21/15 [History]


Spironolactone [Aldactone] 3 tab PO BID 11/21/15 [History]


QUEtiapine Fumarate [Quetiapine Fumarate] 400 mg PO QPM 12/10/17 [History]


Calcium Carbonate/Vitamin D3 [Calcium 600Mg-D3 400 Unit Sfgl] 1 tab PO BID 

18 [History]


Ubidecarenone [Co Q-10] 200 mg PO DAILY 19 [History]


Mirtazapine [Remeron] 30 mg PO BEDTIME 19 [History]


Albuterol Sulfate [Proair Digihaler] 2 inh INH Q4HR PRN 20 [History]


Budesonide/Formoterol [Symbicort 160-4.5 MCG] 2 inh INH BID 20 [History]


Ipratropium/Albuterol Sulfate [Iprat-Albut 0.5-3(2.5) MG/3 ML] 1 ampule INH Q4HR

PRN 20 [History]


Pramipexole Di-HCl [Pramipexole Dihydrochloride] 0.25 mg PO DAILY 20 

[History]


Topiramate 25 mg PO BID 20 [History]


Varenicline [Chantix] 0.5 mg PO DAILY 20 [History]


Varenicline [Chantix] 1 mg PO DAILY 20 [History]


ondansetron HCL [Zofran] 4 mg PO Q4HR PRN 20 [History]


risperiDONE [Risperdal] 5 mg PO BEDTIME 20 [History]


Acetaminophen [Tylenol] 650 mg PO Q8HR 20 [History]


Aspirin [Halfprin] 81 mg PO DAILY 20 [History]


Losartan [Cozaar] 25 mg PO DAILY 20 [History]


Montelukast [Singulair] 10 mg PO DAILY 20 [History]


SUMAtriptan [Imitrex] 25 mg PO ASDIRECTED 20 [History]


amLODIPine Besylate [Norvasc] 2.5 mg PO DAILY 20 [History]


sitaGLIPtin Phos/Metformin HCl [Janumet 50-1,000 MG] 1 tab PO BID 20 

[History]


Albuterol [Ventolin HFA] 0 gm INH Q4HR PRN  inhaler 20 [Rx]


Albuterol/Ipratropium [DuoNeb 3.0-0.5 MG/3 ML] 3 ml NEB Q4H  neb 20 [Rx]


Furosemide 40 mg PO DAILY #60 20 [Rx]


SitaGLIPtin [Januvia] 50 mg PO DAILY  tablet 20 [Rx]


metFORMIN [Glucophage XR] 1,000 mg PO DAILY  tab.er 20 [Rx]


predniSONE [Prednisone] 10 mg PO ASDIRECTED #75 tablet 09/21/20 [Rx]











Past Medical History





- Past Health History


Medical/Surgical History: Denies Medical/Surgical History


HEENT History: Reports: Impaired Vision, Other (See Below)


Other HEENT History: glasses for reading,


Cardiovascular History: Reports: Hypertension


Other Cardiovascular History: states recently has been told juan jose has a valve 

problem but not bad enough for surgery


Respiratory History: Reports: Asthma, Bronchitis, Recurrent, Pneumonia, 

Recurrent, Other (See Below)


Other Respiratory History: Emphysema


Gastrointestinal History: Reports: Bowel Obstruction, GERD, Irritable Bowel 

Syndrome


Genitourinary History: Reports: None


OB/GYN History: Reports: Dysfunctional Uterine Bleeding, Pregnancy


Musculoskeletal History: Reports: Back Pain, Chronic


Other Musculoskeletal History: back / butt pain, shoots down left leg


Neurological History: Reports: Migraines, Neuropathy, Peripheral


Psychiatric History: Reports: Anxiety, Depression, Suicide Attempt, Suicidal 

Ideation


Endocrine/Metabolic History: Reports: Diabetes, Type II, Obesity/BMI 30+


Hematologic History: Reports: Blood Transfusion(s), Iron Deficiency


Dermatologic History: Reports: Eczema, Other (See Below)


Other Dermatologic History: Allergy to paper tape





- Infectious Disease History


Infectious Disease History: Reports: Chicken Pox, Measles, Rubella





- Past Surgical History


GI Surgical History: Reports: None


Female  Surgical History: Reports:  Section, Hysterectomy, Other (See 

Below)


Other Female  Surgeries/Procedures: appendix


Musculoskeletal Surgical History: Reports: None





Social & Family History





- Family History


Family Medical History: No Pertinent Family History


Cardiac: Reports: None


Musculoskeletal: Reports: Arthritis, Back pain, Chronic


Neurological: Reports: None


Psychiatric: Reports: Depression


Endocrine/Metabolic: Reports: None


Oncologic: Reports: None





- Caffeine Use


Caffeine Use: Reports: Coffee, Soda


Other Caffeine Use: 2 cups a day


Caffeine Use Comment: 2 cups of coffee per day 1 soda per day





ED ROS GENERAL





- Review of Systems


Review Of Systems: See Below


Constitutional: Reports: No Symptoms


HEENT: Reports: No Symptoms


Respiratory: Reports: No Symptoms


Cardiovascular: Reports: No Symptoms


GI/Abdominal: Reports: Abdominal Pain, Diarrhea


: Reports: No Symptoms


Musculoskeletal: Reports: No Symptoms


Skin: Reports: No Symptoms


Neurological: Reports: No Symptoms





ED EXAM, GENERAL





- Physical Exam


Exam: See Below


Exam Limited By: No Limitations


General Appearance: Alert, No Apparent Distress


Head: Atraumatic, Normocephalic


Respiratory/Chest: No Respiratory Distress, Lungs Clear, Normal Breath Sounds, 

No Accessory Muscle Use, Chest Non-Tender


Cardiovascular: Normal Peripheral Pulses, Regular Rate, Rhythm, No Edema, No 

Murmur


GI/Abdominal: Soft, No Distention, Guarding, Rebound, Tender, Other (Hyperactive

 bowel sounds x 4; LUQ tenderness to palpation)


Neurological: Alert, Oriented


Psychiatric: Normal Affect, Normal Mood


Skin Exam: Warm, Dry, Intact, Normal Color, No Rash


  ** #1 Interpretation


EKG Date: 20


Time: 13:00


Rhythm: NSR


Rate (Beats/Min): 89


Axis: Normal


P-Wave: Present


QRS: Normal


ST-T: Normal


QT: Normal


EKG Interpretation Comments: 





NO STEMI





Course





- Vital Signs


Text/Narrative:: 





All labs and imaging unremarkable, except for constipation. Patient received 

morphine and pepcid while waiting for CT scan.  


Last Recorded V/S: 


                                Last Vital Signs











Temp  36.2 C   20 13:50


 


Pulse  91   20 13:50


 


Resp  18   20 13:50


 


BP  118/59 L  20 13:50


 


Pulse Ox  98   20 13:50














- Orders/Labs/Meds


Orders: 


                               Active Orders 24 hr











 Category Date Time Status


 


 Abdomen Pelvis wo Cont [CT] Stat Exams  20 13:59 Taken











Labs: 


                                Laboratory Tests











  20 Range/Units





  14:30 


 


Troponin I  < 0.017  (0.000-0.060)  ng/mL











Meds: 


Medications














Discontinued Medications














Generic Name Dose Route Start Last Admin





  Trade Name Maite  PRN Reason Stop Dose Admin


 


Famotidine  20 mg  20 14:00  20 14:43





  Pepcid  PO  20 14:01  Not Given





  ONETIME ONE  


 


Famotidine  20 mg  20 14:01  20 14:36





  Pepcid  IVPUSH  20 14:02  20 mg





  ONETIME ONE   Administration


 


Morphine Sulfate  Confirm  20 15:35  20 15:32





  Morphine  Administered  20 15:36  Not Given





  Dose  





  2 mg  





  .ROUTE  





  .STK-MED ONE  


 


Morphine Sulfate  2 mg  20 15:25  20 15:27





  Morphine  IVPUSH  20 15:26  2 mg





  ONETIME ONE   Administration














Departure





- Departure


Time of Disposition: 16:00


Disposition: Home, Self-Care 01


Condition: Good


Clinical Impression: 


Constipation


Qualifiers:


 Constipation type: unspecified constipation type Qualified Code(s): K59.00 - 

Constipation, unspecified








- Discharge Information


*PRESCRIPTION DRUG MONITORING PROGRAM REVIEWED*: Not Applicable


*COPY OF PRESCRIPTION DRUG MONITORING REPORT IN PATIENT JAE: Not Applicable


Instructions:  Constipation, Adult, Easy-to-Read


Referrals: 


PCP,None [Primary Care Provider] - 


Forms:  ED Department Discharge


Additional Instructions: 


Discharge home.


Colace


Miralax


Bentyl


High fiber diet, more fruit and vegetables; fiber supplements. Try having small 

amounts of cheese, pasta, rice, potatoes. Drink 1 bottle of water with every 

meal. 


Follow up as needed in the clinic.





Sepsis Event Note (ED)





- Evaluation


Sepsis Screening Result: No Definite Risk





- Focused Exam


Vital Signs: 


                                   Vital Signs











  Temp Pulse Resp BP Pulse Ox


 


 20 13:50  36.2 C  91  18  118/59 L  98


 


 20 13:45  36.2 C  91  18  118/59 L  98














- My Orders


Last 24 Hours: 


My Active Orders





20 13:59


Abdomen Pelvis wo Cont [CT] Stat 














- Assessment/Plan


Last 24 Hours: 


My Active Orders





20 13:59


Abdomen Pelvis wo Cont [CT] Stat 











Plan: 





Prescription for bentyl and colace.  Educated on more fiberous diet and to 

decreased pastas and dairy.  More fluid with meals and miralax as directed.  

Follow up with outpatient for GI referral for constipation, IBS, and incidental 

findings of hepatic steatosis and mild cirrhosis.

## 2020-11-13 NOTE — CT
CLINICAL DATA:  LUQ pain.



UNENHANCED ABDOMEN AND PELVIC CT, 12 NOVEMBER 2020:



Multislice acquisition through the abdomen and pelvis without IV, but with oral 
contrast was performed.  



Comparison is made to a prior chest, abdomen and pelvic CT dated January 2018.  



There are stable lung nodules in both lower lobes.  No new abnormalities.  



There is diffuse fatty infiltration of the liver.  No focal hepatic lesions.  
The patient is status post cholecystectomy.  



The spleen appears normal.  The pancreas appears normal.  The right and left 
adrenals appear normal. 



The right and left kidneys appear normal.  No hydronephrosis or hydroureter.  No
nephrocalcinosis or nephrolithiasis.  



The bladder is partially fluid-filled.  It appears normal.  



No evidence of appendicitis.  There is a moderate amount of stool noted in the 
right colon and cecum.  



There are scattered air-fluid levels within the small bowel and colon.  No 
distended loops of bowel.  Enterocolitis should be considered.  Follow-up 
imaging is recommended, if clinically indicated. 



No free air.  No free fluid.  No adenopathy.  No aortic aneurysm.  



There are surgical changes in the anterior abdominal wall.  



Job:  [id number]

UgenieD

## 2020-12-26 ENCOUNTER — HOSPITAL ENCOUNTER (EMERGENCY)
Dept: HOSPITAL 60 - LB.ED | Age: 55
Discharge: HOME | End: 2020-12-26
Payer: MEDICARE

## 2020-12-26 ENCOUNTER — HOSPITAL ENCOUNTER (INPATIENT)
Dept: HOSPITAL 60 - LB.ED | Age: 55
LOS: 2 days | Discharge: HOME | DRG: 202 | End: 2020-12-28
Attending: SURGERY | Admitting: SURGERY
Payer: MEDICARE

## 2020-12-26 VITALS — HEART RATE: 78 BPM

## 2020-12-26 VITALS — DIASTOLIC BLOOD PRESSURE: 53 MMHG | SYSTOLIC BLOOD PRESSURE: 130 MMHG

## 2020-12-26 DIAGNOSIS — J43.1: Primary | ICD-10-CM

## 2020-12-26 DIAGNOSIS — I10: ICD-10-CM

## 2020-12-26 DIAGNOSIS — J45.901: ICD-10-CM

## 2020-12-26 DIAGNOSIS — Z20.828: ICD-10-CM

## 2020-12-26 DIAGNOSIS — Z91.048: ICD-10-CM

## 2020-12-26 DIAGNOSIS — Z79.899: ICD-10-CM

## 2020-12-26 DIAGNOSIS — G89.29: ICD-10-CM

## 2020-12-26 DIAGNOSIS — F17.210: ICD-10-CM

## 2020-12-26 DIAGNOSIS — E11.42: ICD-10-CM

## 2020-12-26 DIAGNOSIS — F32.9: ICD-10-CM

## 2020-12-26 DIAGNOSIS — J43.9: ICD-10-CM

## 2020-12-26 DIAGNOSIS — Z79.84: ICD-10-CM

## 2020-12-26 DIAGNOSIS — E66.9: ICD-10-CM

## 2020-12-26 DIAGNOSIS — J44.9: ICD-10-CM

## 2020-12-26 DIAGNOSIS — K21.9: ICD-10-CM

## 2020-12-26 DIAGNOSIS — Z98.890: ICD-10-CM

## 2020-12-26 DIAGNOSIS — Z79.82: ICD-10-CM

## 2020-12-26 DIAGNOSIS — H54.7: ICD-10-CM

## 2020-12-26 DIAGNOSIS — F41.9: ICD-10-CM

## 2020-12-26 DIAGNOSIS — Z87.01: ICD-10-CM

## 2020-12-26 DIAGNOSIS — M54.9: ICD-10-CM

## 2020-12-26 DIAGNOSIS — Z79.4: ICD-10-CM

## 2020-12-26 DIAGNOSIS — E11.9: ICD-10-CM

## 2020-12-26 DIAGNOSIS — J44.9: Primary | ICD-10-CM

## 2020-12-26 DIAGNOSIS — Z88.8: ICD-10-CM

## 2020-12-26 DIAGNOSIS — F17.200: ICD-10-CM

## 2020-12-26 DIAGNOSIS — Z90.710: ICD-10-CM

## 2020-12-26 DIAGNOSIS — Z79.51: ICD-10-CM

## 2020-12-26 DIAGNOSIS — Z88.4: ICD-10-CM

## 2020-12-26 LAB — SARS-COV-2 RNA RESP QL NAA+PROBE: NEGATIVE

## 2020-12-26 PROCEDURE — U0002 COVID-19 LAB TEST NON-CDC: HCPCS

## 2020-12-26 PROCEDURE — A0429 BLS-EMERGENCY: HCPCS

## 2020-12-26 PROCEDURE — A0425 GROUND MILEAGE: HCPCS

## 2020-12-26 RX ADMIN — ALOGLIPTIN SCH MG: 25 TABLET, FILM COATED ORAL at 20:51

## 2020-12-26 RX ADMIN — METHYLPREDNISOLONE SODIUM SUCCINATE SCH MG: 40 INJECTION, POWDER, FOR SOLUTION INTRAMUSCULAR; INTRAVENOUS at 19:36

## 2020-12-26 RX ADMIN — ALOGLIPTIN SCH TAB: 25 TABLET, FILM COATED ORAL at 20:43

## 2020-12-26 RX ADMIN — ALBUTEROL SULFATE SCH: 2.5 SOLUTION RESPIRATORY (INHALATION) at 20:11

## 2020-12-26 RX ADMIN — MOMETASONE FUROATE AND FORMOTEROL FUMARATE DIHYDRATE SCH: 200; 5 AEROSOL RESPIRATORY (INHALATION) at 22:17

## 2020-12-26 RX ADMIN — ALBUTEROL SULFATE SCH MG: 2.5 SOLUTION RESPIRATORY (INHALATION) at 20:45

## 2020-12-26 NOTE — EDM.PDOC
ED HPI GENERAL MEDICAL PROBLEM





- General


Chief Complaint: Respiratory Problem


Stated Complaint: shortness of breath


Time Seen by Provider: 20 16:15


Source of Information: Reports: Patient, EMS


History Limitations: Reports: No Limitations





- History of Present Illness


INITIAL COMMENTS - FREE TEXT/NARRATIVE: 





patient presented to the ER with a SOB.





h/o COPD, asthma and tobacco abuse. 





was seen in the ER earlier today for the same reason where she was treated and 

stabilized. She improved and was d/cd home. 


she was d/cd on prednisone PO tab.





She reports that she was doing well after d/c . Had a lunch and was on the 

couch,, but started to have some SOB, tried to use her neb machine .. didn't 

feel better, so she called 911.





Upon further questioning, patient admitted that her neb machine is outdated and 

20 yrs old, probably not functioning well anymore.





No CP, fever or chills.  


Onset: Gradual


Duration: Day(s): (3)


Severity: Moderate


Worsens with: Reports: Movement


Associated Symptoms: Reports: Shortness of Breath





- Related Data


                                    Allergies











Allergy/AdvReac Type Severity Reaction Status Date / Time


 


adhesive tape Allergy  Rash Verified 10/11/20 11:21


 


celecoxib [From Celebrex] Allergy  Nausea and Verified 10/11/20 11:21





   Vomiting  


 


ketamine AdvReac Intermediate Agitation Verified 10/11/20 11:21


 


ropinirole AdvReac  Diaphoresis Verified 10/11/20 11:21











Home Meds: 


                                    Home Meds





Omeprazole 20 mg PO ACBREAKFAST 13 [History]


QUEtiapine [SEROquel] 100 mg PO QAM 11/21/15 [History]


Spironolactone [Aldactone] 3 tab PO BID 11/21/15 [History]


QUEtiapine Fumarate [Quetiapine Fumarate] 400 mg PO QPM 12/10/17 [History]


Calcium Carbonate/Vitamin D3 [Calcium 600Mg-D3 400 Unit Sfgl] 1 tab PO BID 

18 [History]


Ubidecarenone [Co Q-10] 200 mg PO DAILY 19 [History]


Mirtazapine [Remeron] 30 mg PO BEDTIME 19 [History]


Albuterol Sulfate [Proair Digihaler] 2 inh INH Q4HR PRN 20 [History]


Budesonide/Formoterol [Symbicort 160-4.5 MCG] 2 inh INH BID 20 [History]


Pramipexole Di-HCl [Pramipexole Dihydrochloride] 0.25 mg PO DAILY 20 

[History]


Topiramate 25 mg PO BID 20 [History]


Varenicline [Chantix] 0.5 mg PO DAILY 20 [History]


Varenicline [Chantix] 1 mg PO DAILY 20 [History]


ondansetron HCL [Zofran] 4 mg PO Q4HR PRN 20 [History]


risperiDONE [Risperdal] 5 mg PO BEDTIME 20 [History]


Acetaminophen [Tylenol] 650 mg PO Q8HR 20 [History]


Aspirin [Halfprin] 81 mg PO DAILY 20 [History]


Losartan [Cozaar] 25 mg PO DAILY 20 [History]


Montelukast [Singulair] 10 mg PO DAILY 20 [History]


SUMAtriptan [Imitrex] 25 mg PO ASDIRECTED 20 [History]


amLODIPine Besylate [Norvasc] 2.5 mg PO DAILY 20 [History]


sitaGLIPtin Phos/Metformin HCl [Janumet 50-1,000 MG] 50 tab PO BID 20 

[History]


Albuterol/Ipratropium [DuoNeb 3.0-0.5 MG/3 ML] 3 ml NEB Q4H  neb 20 [Rx]


Furosemide 40 mg PO DAILY #60 20 [Rx]


metFORMIN [Glucophage XR] 1,000 mg PO DAILY  tab.er 20 [Rx]











Past Medical History





- Past Health History


Medical/Surgical History: Denies Medical/Surgical History


HEENT History: Reports: Impaired Vision, Other (See Below)


Other HEENT History: glasses for reading,


Cardiovascular History: Reports: Heart Murmur, Hypertension


Other Cardiovascular History: states recently has been told juan jose has a valve 

problem but not bad enough for surgery


Respiratory History: Reports: Asthma, Bronchitis, Recurrent, Pneumonia, 

Recurrent, Other (See Below)


Other Respiratory History: Emphysema


Gastrointestinal History: Reports: Bowel Obstruction, GERD, Irritable Bowel 

Syndrome


Genitourinary History: Reports: None


OB/GYN History: Reports: Dysfunctional Uterine Bleeding, Pregnancy


Musculoskeletal History: Reports: Back Pain, Chronic


Other Musculoskeletal History: back / butt pain, shoots down left leg


Neurological History: Reports: Migraines, Neuropathy, Peripheral


Psychiatric History: Reports: Anxiety, Depression, Suicide Attempt, Suicidal 

Ideation


Endocrine/Metabolic History: Reports: Diabetes, Type II, Obesity/BMI 30+


Hematologic History: Reports: Blood Transfusion(s), Iron Deficiency


Dermatologic History: Reports: Eczema, Other (See Below)


Other Dermatologic History: Allergy to paper tape





- Infectious Disease History


Infectious Disease History: Reports: Chicken Pox, Measles, Rubella





- Past Surgical History


HEENT Surgical History: Reports: Eye Surgery


Other HEENT Surgeries/Procedures: Eye Surgery, when 7 yr and grade school


Cardiovascular Surgical History: Reports: None


Respiratory Surgical History: Reports: None


GI Surgical History: Reports: None


Other GI Surgeries/Procedures: ABD. SURGERY AND SCAR TISSUE REMOVAL


Female  Surgical History: Reports:  Section, Hysterectomy, Other (See 

Below)


Other Female  Surgeries/Procedures: appendix


Musculoskeletal Surgical History: Reports: None





Social & Family History





- Family History


Family Medical History: No Pertinent Family History


Cardiac: Reports: None


Musculoskeletal: Reports: Arthritis, Back pain, Chronic


Neurological: Reports: None


Psychiatric: Reports: Depression


Endocrine/Metabolic: Reports: None


Oncologic: Reports: None





- Caffeine Use


Caffeine Use: Reports: Coffee, Soda


Other Caffeine Use: 2 cups a day


Caffeine Use Comment: 2 cups of coffee per day 1 soda per day





ED ROS GENERAL





- Review of Systems


Review Of Systems: Comprehensive ROS is negative, except as noted in HPI.


Constitutional: Reports: Diaphoresis.  Denies: Fever, Chills


HEENT: Reports: No Symptoms


Respiratory: Reports: Shortness of Breath, Wheezing


Cardiovascular: Reports: No Symptoms


Endocrine: Reports: No Symptoms


GI/Abdominal: Reports: No Symptoms


Musculoskeletal: Reports: No Symptoms


Skin: Reports: No Symptoms


Neurological: Reports: No Symptoms


Psychiatric: Reports: Agitation, Anxiety





ED EXAM, GENERAL





- Physical Exam


Exam: See Below


Exam Limited By: No Limitations


General Appearance: Alert, Mild Distress


Eye Exam: Bilateral Eye: PERRL


Throat/Mouth: Normal Inspection


Neck: Normal Inspection


Respiratory/Chest: Respiratory Distress, Decreased Breath Sounds, Wheezing, 

Retractions


Cardiovascular: Normal Peripheral Pulses, No Edema, Tachycardia


GI/Abdominal: Normal Bowel Sounds, Soft, Non-Tender, No Distention


Neurological: Alert, Oriented, Normal Cognition


Psychiatric: Normal Affect, Normal Mood, Anxious


Skin Exam: Warm, Dry, Intact





Course





- Orders/Labs/Meds


Orders: 


                               Active Orders 24 hr











 Category Date Time Status


 


 RT Aerosol Therapy [RC] ASDIRECTED Care  20 16:25 Ordered


 


 RT Aerosol Therapy [RC] ASDIRECTED Care  20 16:26 Ordered











Meds: 


Medications














Discontinued Medications














Generic Name Dose Route Start Last Admin





  Trade Name Freq  PRN Reason Stop Dose Admin


 


Albuterol  2.5 mg  20 16:25  20 16:10





  Proventil Neb Soln  NEB  20 16:26  2.5 mg





  ONETIME ONE   Administration


 


Budesonide  0.5 mg  20 16:26  20 16:46





  Pulmicort  NEB  20 16:27  0.5 mg





  ONETIME ONE   Administration


 


Budesonide  Confirm  20 16:54 





  Pulmicort  Administered  20 16:55 





  Dose  





  0.5 mg  





  .ROUTE  





  .STK-MED ONE  


 


Magnesium Sulfate/Dextrose  Confirm  20 16:57 





  Magnesium Sulfate In D5w 100 Premix  Administered  20 16:58 





  Dose  





  1 gm in 100 mls @ as directed  





  .ROUTE  





  .STK-MED ONE  


 


Magnesium Sulfate/Dextrose  1 gm  20 16:25  20 17:14





  Magnesium Sulfate In D5w 100 Premix  IV  20 16:26  1 gm





  NOW STA   Administration














- Re-Assessments/Exams


Free Text/Narrative Re-Assessment/Exam: 





20 16:32


was given albuterol neb upon arrival -with improvement in symptoms.





IV mag sulphate was also given to help relax her skeletal muscle airways.





was hooked up to a monitor and O2 sensor. 





nebs were repeated until her breathing was near back to her baseline





Departure





- Departure


Time of Disposition: 17:14


Disposition: Admitted As Inpatient 66


Condition: Fair


Clinical Impression: 


 Acute exacerbation of COPD with asthma








- Discharge Information


*PRESCRIPTION DRUG MONITORING PROGRAM REVIEWED*: Not Applicable


*COPY OF PRESCRIPTION DRUG MONITORING REPORT IN PATIENT JAE: Not Applicable


Forms:  ED Department Discharge





- Problem List & Annotations


(1) Acute exacerbation of COPD with asthma


SNOMED Code(s): 0807121274761


   Code(s): J44.1 - CHRONIC OBSTRUCTIVE PULMONARY DISEASE W (ACUTE) 

EXACERBATION; J45.901 - UNSPECIFIED ASTHMA WITH (ACUTE) EXACERBATION   Status: 

Acute   Priority: High   





- My Orders


Last 24 Hours: 


My Active Orders





20 16:25


RT Aerosol Therapy [RC] ASDIRECTED 





20 16:26


RT Aerosol Therapy [RC] ASDIRECTED 














- Assessment/Plan


Last 24 Hours: 


My Active Orders





20 16:25


RT Aerosol Therapy [RC] ASDIRECTED 





20 16:26


RT Aerosol Therapy [RC] ASDIRECTED 











Plan: 





admission to the floor for nebs and IV meds.





patient has a mal-functional neb machine at home that will need to be replaced 

by a new one otherwise she will continue to return to the ER for SOB.

## 2020-12-26 NOTE — EDM.PDOC
ED HPI GENERAL MEDICAL PROBLEM





- General


Chief Complaint: Chest Pain


Stated Complaint: difficulty breathing


Time Seen by Provider: 20 11:25


Source of Information: Reports: Patient


History Limitations: Reports: No Limitations





- History of Present Illness


INITIAL COMMENTS - FREE TEXT/NARRATIVE: 





patient with a h/o COPD, asthma and active smoking ( smoked for 30 yrs ) who pre

sented to the ER due to worsening of SOB over the last 3 days. 


No fever or chills. 


Reports that it is similar to her COPD attack and nebs at home haven't been 

helping.


She last smoked yesterday.


Reports tightness in the chest, worse with activities. No changes in cough. No 

exposure to patient with COVID.


No abd pain, N/V.





Frequent visits to the ER for the same reason


Onset: Gradual


Duration: Day(s): (3)


Location: Reports: Chest


Severity: Moderate


Worsens with: Reports: Movement


Context: Reports: Activity


Associated Symptoms: Reports: Shortness of Breath.  Denies: Fever/Chills


Treatments PTA: Reports: EKG





- Related Data


                                    Allergies











Allergy/AdvReac Type Severity Reaction Status Date / Time


 


adhesive tape Allergy  Rash Verified 10/11/20 11:21


 


celecoxib [From Celebrex] Allergy  Nausea and Verified 10/11/20 11:21





   Vomiting  


 


ketamine AdvReac Intermediate Agitation Verified 10/11/20 11:21


 


ropinirole AdvReac  Diaphoresis Verified 10/11/20 11:21











Home Meds: 


                                    Home Meds





Omeprazole 20 mg PO ACBREAKFAST 13 [History]


QUEtiapine [SEROquel] 100 mg PO QAM 11/21/15 [History]


Spironolactone [Aldactone] 3 tab PO BID 11/21/15 [History]


QUEtiapine Fumarate [Quetiapine Fumarate] 400 mg PO QPM 12/10/17 [History]


Calcium Carbonate/Vitamin D3 [Calcium 600Mg-D3 400 Unit Sfgl] 1 tab PO BID 

18 [History]


Ubidecarenone [Co Q-10] 200 mg PO DAILY 19 [History]


Mirtazapine [Remeron] 30 mg PO BEDTIME 19 [History]


Albuterol Sulfate [Proair Digihaler] 2 inh INH Q4HR PRN 20 [History]


Budesonide/Formoterol [Symbicort 160-4.5 MCG] 2 inh INH BID 20 [History]


Pramipexole Di-HCl [Pramipexole Dihydrochloride] 0.25 mg PO DAILY 20 

[History]


Topiramate 25 mg PO BID 20 [History]


Varenicline [Chantix] 0.5 mg PO DAILY 20 [History]


Varenicline [Chantix] 1 mg PO DAILY 20 [History]


ondansetron HCL [Zofran] 4 mg PO Q4HR PRN 20 [History]


risperiDONE [Risperdal] 5 mg PO BEDTIME 20 [History]


Acetaminophen [Tylenol] 650 mg PO Q8HR 20 [History]


Aspirin [Halfprin] 81 mg PO DAILY 20 [History]


Losartan [Cozaar] 25 mg PO DAILY 20 [History]


Montelukast [Singulair] 10 mg PO DAILY 20 [History]


SUMAtriptan [Imitrex] 25 mg PO ASDIRECTED 20 [History]


amLODIPine Besylate [Norvasc] 2.5 mg PO DAILY 20 [History]


sitaGLIPtin Phos/Metformin HCl [Janumet 50-1,000 MG] 50 tab PO BID 20 

[History]


Albuterol/Ipratropium [DuoNeb 3.0-0.5 MG/3 ML] 3 ml NEB Q4H  neb 20 [Rx]


Furosemide 40 mg PO DAILY #60 20 [Rx]


metFORMIN [Glucophage XR] 1,000 mg PO DAILY  tab.er 20 [Rx]











Past Medical History





- Past Health History


Medical/Surgical History: Denies Medical/Surgical History


HEENT History: Reports: Impaired Vision, Other (See Below)


Other HEENT History: glasses for reading,


Cardiovascular History: Reports: Hypertension


Other Cardiovascular History: states recently has been told juan jose has a valve 

problem but not bad enough for surgery


Respiratory History: Reports: Asthma, Bronchitis, Recurrent, Pneumonia, 

Recurrent, Other (See Below)


Other Respiratory History: Emphysema


Gastrointestinal History: Reports: Bowel Obstruction, GERD, Irritable Bowel 

Syndrome


Genitourinary History: Reports: None


OB/GYN History: Reports: Dysfunctional Uterine Bleeding, Pregnancy


Musculoskeletal History: Reports: Back Pain, Chronic


Other Musculoskeletal History: back / butt pain, shoots down left leg


Neurological History: Reports: Migraines, Neuropathy, Peripheral


Psychiatric History: Reports: Anxiety, Depression, Suicide Attempt, Suicidal 

Ideation


Endocrine/Metabolic History: Reports: Diabetes, Type II, Obesity/BMI 30+


Hematologic History: Reports: Blood Transfusion(s), Iron Deficiency


Dermatologic History: Reports: Eczema, Other (See Below)


Other Dermatologic History: Allergy to paper tape





- Infectious Disease History


Infectious Disease History: Reports: Chicken Pox, Measles, Rubella





- Past Surgical History


GI Surgical History: Reports: None


Female  Surgical History: Reports:  Section, Hysterectomy, Other (See 

Below)


Other Female  Surgeries/Procedures: appendix


Musculoskeletal Surgical History: Reports: None





Social & Family History





- Family History


Family Medical History: No Pertinent Family History


Cardiac: Reports: None


Musculoskeletal: Reports: Arthritis, Back pain, Chronic


Neurological: Reports: None


Psychiatric: Reports: Depression


Endocrine/Metabolic: Reports: None


Oncologic: Reports: None





- Tobacco Use


Tobacco Use Status *Q: Current Every Day Tobacco User


Years of Tobacco use: 35


Packs/Tins Daily: 0.5


Used Tobacco, but Quit: No


Second Hand Smoke Exposure: Yes





- Caffeine Use


Caffeine Use: Reports: Coffee, Soda


Other Caffeine Use: 2 cups a day


Caffeine Use Comment: 2 cups of coffee per day 1 soda per day





- Recreational Drug Use


Recreational Drug Use: No





ED ROS GENERAL





- Review of Systems


Review Of Systems: Comprehensive ROS is negative, except as noted in HPI.


Constitutional: Denies: Fever, Chills


HEENT: Reports: No Symptoms


Respiratory: Reports: Shortness of Breath


Cardiovascular: Reports: Dyspnea on Exertion


Endocrine: Reports: No Symptoms


GI/Abdominal: Reports: No Symptoms


: Reports: No Symptoms


Neurological: Reports: No Symptoms


Psychiatric: Reports: Agitation, Anxiety, Depression


Immunologic: Reports: No Symptoms





ED EXAM, GENERAL





- Physical Exam


Exam: See Below


Exam Limited By: No Limitations


General Appearance: Alert, Mild Distress


Nose: Normal Inspection


Throat/Mouth: Normal Inspection


Head: Atraumatic, Normocephalic


Neck: Normal Inspection


Respiratory/Chest: Lungs Clear, Respiratory Distress, Decreased Breath Sounds, 

Wheezing, Accessory Muscle Use, Prolonged Expiration


Cardiovascular: Normal Peripheral Pulses, Regular Rate, Rhythm


GI/Abdominal: Normal Bowel Sounds, Soft, Non-Tender


Back Exam: Normal Inspection


Extremities: Normal Inspection


Neurological: Alert, Oriented, Normal Cognition, No Motor/Sensory Deficits


Psychiatric: Normal Affect, Normal Mood, Anxious





Course





- Vital Signs


Last Recorded V/S: 


                                Last Vital Signs











Temp  37.0 C   20 11:17


 


Pulse  78   20 12:08


 


Resp  20   20 11:17


 


BP  130/53 L  20 11:58


 


Pulse Ox      














- Orders/Labs/Meds


Orders: 


                               Active Orders 24 hr











 Category Date Time Status


 


 EKG Documentation Completion [RC] ASDIRECTED Care  20 11:20 Active


 


 RT Aerosol Therapy [RC] ASDIRECTED Care  20 11:32 Active


 


 RT Aerosol Therapy [RC] ASDIRECTED Care  20 12:48 Active


 


 Chest 1V Frontal [CR] Stat Exams  20 11:32 Taken











Labs: 


                                Laboratory Tests











  20 Range/Units





  12:00 12:00 


 


WBC  5.1  D   (4.0-11.0)  K/uL


 


RBC  3.84   (3.80-5.80)  M/uL


 


Hgb  11.2 L   (11.5-16.5)  g/dL


 


Hct  34.5 L   (37.0-47.0)  %


 


MCV  90   (76-96)  fL


 


MCH  29.2   (27.0-32.0)  pg


 


MCHC  32.5   (31.0-35.0)  g/dL


 


RDW  13.8   (11.0-16.0)  %


 


Plt Count  164   (150-500)  K/uL


 


MPV  10.8 H   (6.0-10.0)  fL


 


Neut % (Auto)  65.0   (45.0-70.0)  %


 


Lymph % (Auto)  25.4   (20.0-40.0)  %


 


Mono % (Auto)  8.0   (3.0-10.0)  %


 


Eos % (Auto)  0.8 L   (1.0-5.0)  %


 


Baso % (Auto)  0.8 H   (0.0-0.5)  %


 


Neut # (Auto)  3.32   (2.00-7.50)  K/uL


 


Lymph # (Auto)  1.30 L   (1.50-4.00)  K/uL


 


Mono # (Auto)  0.41   (0.20-0.80)  K/uL


 


Eos # (Auto)  0.04   (0.04-0.40)  K/uL


 


Baso # (Auto)  0.04   (0.02-0.10)  K/uL


 


Sodium   142  (136-145)  mmol/L


 


Potassium   3.4 L D  (3.5-5.1)  mmol/L


 


Chloride   104  ()  mmol/L


 


Carbon Dioxide   29.3  (21.0-32.0)  mmol/L


 


Anion Gap   12.1  (5.0-15.0)  mmol/L


 


BUN   11  D  (8-26)  mg/dL


 


Creatinine   1.11 H D  (0.55-1.02)  mg/dL


 


Est Cr Clr Drug Dosing   49.45  mL/min


 


Estimated GFR (MDRD)   51 L  (>60)  MLS/MIN


 


BUN/Creatinine Ratio   9.9  (6-25)  


 


Glucose   95  D  ()  mg/dL


 


Calcium   8.3 L  (8.5-10.1)  mg/dL


 


Total Bilirubin   0.3  (0.0-1.0)  mg/dL


 


AST   23  (15-37)  U/L


 


ALT   23  (12-78)  U/L


 


Alkaline Phosphatase   54  ()  U/L


 


Troponin I   < 0.017  (0.000-0.060)  ng/mL


 


Total Protein   6.6  (6.4-8.2)  g/dL


 


Albumin   3.4  (3.4-5.0)  g/dL


 


Globulin   3.2  (2.2-4.2)  g/dL


 


Albumin/Globulin Ratio   1.1  (0.8-2.0)  











Meds: 


Medications














Discontinued Medications














Generic Name Dose Route Start Last Admin





  Trade Name Freq  PRN Reason Stop Dose Admin


 


Albuterol  2.5 mg  20 12:47  20 12:50





  Proventil Neb Soln  NEB  20 12:48  2.5 mg





  ONETIME ONE   Administration


 


Albuterol  Confirm  20 12:59 





  Proventil Neb Soln  Administered  20 13:00 





  Dose  





  2.5 mg  





  .ROUTE  





  .STK-MED ONE  


 


Albuterol/Ipratropium  3 ml  20 11:32  20 11:40





  Duoneb 3.0-0.5 Mg/3 Ml  NEB  20 11:33  3 ml





  STAT STA   Administration


 


Albuterol/Ipratropium  Confirm  20 12:57  20 12:51





  Duoneb 3.0-0.5 Mg/3 Ml  Administered  20 12:58  Not Given





  Dose  





  3 ml  





  .ROUTE  





  .STK-MED ONE  


 


Aspirin  324 mg  20 11:20  20 11:58





  Aspirin  PO  20 11:21  324 mg





  ONETIME ONE   Administration


 


Methylprednisolone Sodium Succinate  125 mg  20 11:32  20 11:50





  Solu-Medrol  IVPUSH  20 11:33  125 mg





  ONETIME ONE   Administration


 


Nitroglycerin  0.4 mg  20 11:21  20 11:58





  Nitrostat  SL  20 11:22  0.4 mg





  ONETIME ONE   Administration














- Re-Assessments/Exams


Free Text/Narrative Re-Assessment/Exam: 





20 12:54





no hypoxia





nebs was given, as well as, solumedrol IV -reports significant improvement in 

symptoms.





labs -no leukocytosis





CXR - hyperexpansion but no e/o infiltrates.





discussed with patient smoking cessation 











Departure





- Departure


Time of Disposition: 12:56


Disposition: Home, Self-Care 01


Condition: Good


Clinical Impression: 


COPD (chronic obstructive pulmonary disease)


Qualifiers:


 COPD type: unspecified COPD Qualified Code(s): J44.9 - Chronic obstructive 

pulmonary disease, unspecified








- Discharge Information


*PRESCRIPTION DRUG MONITORING PROGRAM REVIEWED*: Not Applicable


*COPY OF PRESCRIPTION DRUG MONITORING REPORT IN PATIENT JAE: Not Applicable


Instructions:  Coping with Quitting Smoking, Chronic Obstructive Pulmonary 

Disease, Easy-to-Read


Referrals: 


PCP,None [Primary Care Provider] - 


Forms:  ED Department Discharge


Additional Instructions: 


- start taking prednisone as prescribed


- recommends smoking cessation


- use nebs at home on a daily basis and when needed


- follow up with your PCP next week as needed


- return to the ER if symptoms got worse or any concerns 





Sepsis Event Note (ED)





- Evaluation


Sepsis Screening Result: No Definite Risk





- Focused Exam


Vital Signs: 


                                   Vital Signs











  Temp Pulse Resp BP BP


 


 20 12:08   78   


 


 20 11:58     130/53 L 


 


 20 11:17  37.0 C  73  20   146/77 H














- Problem List & Annotations


(1) COPD (chronic obstructive pulmonary disease)


SNOMED Code(s): 05604544


   Code(s): J44.9 - CHRONIC OBSTRUCTIVE PULMONARY DISEASE, UNSPECIFIED   Status:

Chronic   Current Visit: Yes   


Qualifiers: 


   COPD type: unspecified COPD   Qualified Code(s): J44.9 - Chronic obstructive 

pulmonary disease, unspecified   





- My Orders


Last 24 Hours: 


My Active Orders





20 11:20


EKG Documentation Completion [RC] ASDIRECTED 





20 11:32


RT Aerosol Therapy [RC] ASDIRECTED 


Chest 1V Frontal [CR] Stat 





20 12:48


RT Aerosol Therapy [RC] ASDIRECTED 














- Assessment/Plan


Last 24 Hours: 


My Active Orders





20 11:20


EKG Documentation Completion [RC] ASDIRECTED 





20 11:32


RT Aerosol Therapy [RC] ASDIRECTED 


Chest 1V Frontal [CR] Stat 





20 12:48


RT Aerosol Therapy [RC] ASDIRECTED 











Plan: 





- start taking prednisone as prescribed


- recommends smoking cessation


- use nebs at home on a daily basis and when needed


- follow up with your PCP next week as needed


- return to the ER if symptoms got worse or any concerns

## 2020-12-27 RX ADMIN — ALBUTEROL SULFATE SCH MG: 2.5 SOLUTION RESPIRATORY (INHALATION) at 02:51

## 2020-12-27 RX ADMIN — OMEPRAZOLE SCH MG: 20 CAPSULE, DELAYED RELEASE ORAL at 05:34

## 2020-12-27 RX ADMIN — ALOGLIPTIN SCH TAB: 25 TABLET, FILM COATED ORAL at 17:09

## 2020-12-27 RX ADMIN — METHYLPREDNISOLONE SODIUM SUCCINATE SCH MG: 40 INJECTION, POWDER, FOR SOLUTION INTRAMUSCULAR; INTRAVENOUS at 02:29

## 2020-12-27 RX ADMIN — ALOGLIPTIN SCH MG: 25 TABLET, FILM COATED ORAL at 19:27

## 2020-12-27 RX ADMIN — MOMETASONE FUROATE AND FORMOTEROL FUMARATE DIHYDRATE SCH PUFF: 200; 5 AEROSOL RESPIRATORY (INHALATION) at 19:27

## 2020-12-27 RX ADMIN — ALBUTEROL SULFATE SCH MG: 2.5 SOLUTION RESPIRATORY (INHALATION) at 05:30

## 2020-12-27 RX ADMIN — METHYLPREDNISOLONE SODIUM SUCCINATE SCH MG: 40 INJECTION, POWDER, FOR SOLUTION INTRAMUSCULAR; INTRAVENOUS at 17:09

## 2020-12-27 RX ADMIN — MOMETASONE FUROATE AND FORMOTEROL FUMARATE DIHYDRATE SCH PUFF: 200; 5 AEROSOL RESPIRATORY (INHALATION) at 07:57

## 2020-12-27 RX ADMIN — OMEPRAZOLE SCH: 20 CAPSULE, DELAYED RELEASE ORAL at 08:01

## 2020-12-27 RX ADMIN — ALBUTEROL SULFATE SCH MG: 2.5 SOLUTION RESPIRATORY (INHALATION) at 17:09

## 2020-12-27 RX ADMIN — ALBUTEROL SULFATE SCH MG: 2.5 SOLUTION RESPIRATORY (INHALATION) at 20:53

## 2020-12-27 RX ADMIN — ALBUTEROL SULFATE SCH: 2.5 SOLUTION RESPIRATORY (INHALATION) at 02:17

## 2020-12-27 RX ADMIN — ALOGLIPTIN SCH TAB: 25 TABLET, FILM COATED ORAL at 08:44

## 2020-12-27 RX ADMIN — ALBUTEROL SULFATE SCH MG: 2.5 SOLUTION RESPIRATORY (INHALATION) at 08:45

## 2020-12-27 RX ADMIN — ALBUTEROL SULFATE SCH MG: 2.5 SOLUTION RESPIRATORY (INHALATION) at 12:35

## 2020-12-27 RX ADMIN — METHYLPREDNISOLONE SODIUM SUCCINATE SCH MG: 40 INJECTION, POWDER, FOR SOLUTION INTRAMUSCULAR; INTRAVENOUS at 08:45

## 2020-12-27 NOTE — PCM.HP.2
H&P History of Present Illness





- General


Date of Service: 20


Admit Problem/Dx: 


                           Admission Diagnosis/Problem





Admission Diagnosis/Problem      COPD, Severe chronic obstructive pulmonary


                                 disease








Source of Information: Patient


History Limitations: Reports: No Limitations





- History of Present Illness


Initial Comments - Free Text/Narative: 





patient presented to the ER with a c/o SOB. 


h/o COPD, asthma and nicotine addiction.


no fever or chills.


Have used her nebs at home, but hasn't been helping. She reports that her 

machine is very old and probably not functioning well. 





Was admitted to the floor for stabilization.





Nebs treatment, IV steroids and O2 supplement. 


Onset of Symptoms: Reports: Gradual


Duration of Symptoms: Reports: Day(s): (3)


Location: Reports: Chest


Worsens with: Reports: Movement


  ** Headache


Pain Score (Numeric/FACES): 9





- Related Data


Allergies/Adverse Reactions: 


                                    Allergies











Allergy/AdvReac Type Severity Reaction Status Date / Time


 


bisoprolol Allergy Severe Shortness Verified 20 13:18





   of Breath  


 


adhesive tape Allergy  Rash Verified 20 13:18


 


celecoxib [From Celebrex] Allergy  Nausea and Verified 20 18:57





   Vomiting  


 


pramipexole Allergy  Diaphoresis Verified 20 13:18


 


ketamine AdvReac Intermediate Agitation Verified 20 18:57











Home Medications: 


                                    Home Meds





Omeprazole 20 mg PO BIDAC 13 [History]


QUEtiapine [SEROquel] 100 mg PO QAM 11/21/15 [History]


QUEtiapine Fumarate [Quetiapine Fumarate] 400 mg PO QPM 12/10/17 [History]


Calcium Carbonate/Vitamin D3 [Calcium 600Mg-D3 400 Unit Sfgl] 1 tab PO BID 

18 [History]


Ubidecarenone [Co Q-10] 200 mg PO BEDTIME 19 [History]


Mirtazapine [Remeron] 1 - 2 tab PO BEDTIME 19 [History]


Albuterol Sulfate [Proair Digihaler] 2 inh INH Q4HR PRN 20 [History]


Budesonide/Formoterol [Symbicort 160-4.5 MCG] 2 inh INH BID 20 [History]


Topiramate 25 mg PO BID 20 [History]


Varenicline [Chantix] 1 mg PO DAILY 20 [History]


ondansetron HCL [Zofran] 4 mg PO Q4HR PRN 20 [History]


risperiDONE [Risperdal] 5 mg PO BEDTIME 20 [History]


Aspirin [Halfprin] 81 mg PO DAILY 20 [History]


Losartan [Cozaar] 12.5 mg PO DAILY 20 [History]


Montelukast [Singulair] 10 mg PO BEDTIME 20 [History]


SUMAtriptan [Imitrex] 25 mg PO ASDIRECTED PRN 20 [History]


sitaGLIPtin Phos/Metformin HCl [Janumet 50-1,000 MG] 1 tab PO BID 20 

[History]


Albuterol/Ipratropium [DuoNeb 3.0-0.5 MG/3 ML] 3 ml NEB Q4H  neb 20 [Rx]


Furosemide 40 mg PO DAILY #60 20 [Rx]


Acetaminophen [Tylenol Arthritis] 1 - 2 tab PO Q8H PRN 20 [History]


Baclofen 10 mg PO BID 20 [History]


Dicyclomine [Bentyl] 10 mg PO TID PRN 20 [History]


Docusate Sodium [Dulcolax Stool Softener] 100 mg PO BID PRN 20 [History]


Ipratropium/Albuterol Sulfate [Iprat-Albut 0.5-3(2.5) MG/3 ML] 3 ml IH Q4H PRN 

20 [History]


Ketorolac [Toradol] 10 mg PO TID PRN 20 [History]


Meloxicam [Mobic] 7.5 mg PO ASDIRECTED PRN 20 [History]


QUEtiapine [SEROquel] 50 mg PO TID PRN 20 [History]


Spironolactone [Aldactone] 2 tab PO BID 20 [History]


atorvaSTATin [Lipitor] 20 mg PO BEDTIME 20 [History]


rOPINIRole [Requip] 0.5 mg PO BEDTIME 20 [History]


Albuterol [Proventil Neb Soln] 2.5 mg NEB Q4H #60 neb 20 [Rx]


Azithromycin [Zithromax] 500 mg PO Q24H #4 tablet 20 [Rx]


Nicotine [Habitrol] 21 mg TRDERM DAILY #14 patch 20 [Rx]


QUEtiapine [SEROquel] 50 mg PO TID PRN  tablet 20 [Rx]


QUEtiapine [SEROquel] 100 mg PO QAM  tablet 20 [Rx]


SitaGLIPtin [Januvia] 50 mg PO BIDMEALS  tablet 20 [Rx]


Spironolactone [Aldactone] 50 mg PO BID@0800,1200  tablet 20 [Rx]


amLODIPine [Norvasc] 2.5 mg PO DAILY #30 tablet 20 [Rx]


predniSONE [Prednisone] 5 mg PO DAILY #3 tablet 20 [Rx]


predniSONE [Prednisone] 10 mg PO DAILY #3 tablet 20 [Rx]


predniSONE [Prednisone] 20 mg PO DAILY #3 tablet 20 [Rx]


rOPINIRole [Requip] 0.5 mg PO BEDTIME  tablet 20 [Rx]


risperiDONE [RisperiDAL] 5 mg PO BEDTIME  tablet 20 [Rx]











Past Medical History





- Past Health History


Medical/Surgical History: Denies Medical/Surgical History


HEENT History: Reports: Impaired Vision, Other (See Below)


Other HEENT History: glasses for reading,


Cardiovascular History: Reports: Heart Murmur, Hypertension


Other Cardiovascular History: states recently has been told juan jose has a valve 

problem but not bad enough for surgery


Respiratory History: Reports: Asthma, Bronchitis, Recurrent, COPD, Pneumonia, 

Recurrent, Other (See Below)


Other Respiratory History: Emphysema


Gastrointestinal History: Reports: Bowel Obstruction, GERD, Irritable Bowel 

Syndrome


Genitourinary History: Reports: None


OB/GYN History: Reports: Dysfunctional Uterine Bleeding, Pregnancy


Musculoskeletal History: Reports: Back Pain, Chronic


Other Musculoskeletal History: back / butt pain, shoots down left leg


Neurological History: Reports: Migraines, Neuropathy, Peripheral


Psychiatric History: Reports: Anxiety, Depression, Suicide Attempt, Suicidal 

Ideation


Other Psychiatric History: suicide attemp years ago, states got help and no long

 thinks of it


Endocrine/Metabolic History: Reports: Diabetes, Type II, Obesity/BMI 30+


Hematologic History: Reports: Blood Transfusion(s), Iron Deficiency


Oncologic (Cancer) History: Reports: None


Dermatologic History: Reports: Eczema, Other (See Below)


Other Dermatologic History: Allergy to paper tape





- Infectious Disease History


Infectious Disease History: Reports: Chicken Pox, Measles, Rubella





- Past Surgical History


HEENT Surgical History: Reports: Eye Surgery


Other HEENT Surgeries/Procedures: Eye Surgery, when 7 yr and grade school


Cardiovascular Surgical History: Reports: None


Respiratory Surgical History: Reports: None


GI Surgical History: Reports: None


Other GI Surgeries/Procedures: ABD. SURGERY AND SCAR TISSUE REMOVAL


Female  Surgical History: Reports:  Section, Hysterectomy, Other (See 

Below)


Other Female  Surgeries/Procedures: appendix


Endocrine Surgical History: Reports: None


Neurological Surgical History: Reports: None


Musculoskeletal Surgical History: Reports: None


Dermatological Surgical History: Reports: None





Social & Family History





- Family History


Family Medical History: No Pertinent Family History


Cardiac: Reports: None


Musculoskeletal: Reports: Arthritis, Back pain, Chronic


Neurological: Reports: None


Psychiatric: Reports: Depression


Endocrine/Metabolic: Reports: None


Oncologic: Reports: None





- Tobacco Use


Tobacco Use Status *Q: Current Every Day Tobacco User


Years of Tobacco use: 35


Packs/Tins Daily: 0.3


Second Hand Smoke Exposure: Yes





- Caffeine Use


Caffeine Use: Reports: Coffee, Soda


Other Caffeine Use: 2 cups a day


Caffeine Use Comment: 2 cups of coffee per day 1 soda per day





- Recreational Drug Use


Recreational Drug Use: No





H&P Review of Systems





- Review of Systems:


Review Of Systems: Comprehensive ROS is negative, except as noted in HPI.


HEENT: Reports: No Symptoms


Pulmonary: Reports: Shortness of Breath, Wheezing


Cardiovascular: Reports: No Symptoms


Gastrointestinal: Reports: No Symptoms


Genitourinary: Reports: No Symptoms


Musculoskeletal: Reports: No Symptoms


Skin: Reports: No Symptoms


Psychiatric: Reports: Anxiety


Neurological: Reports: No Symptoms





Exam





- Exam


Exam: See Below





- Vital Signs


Vital Signs: 


                                Last Vital Signs











Temp  36.2 C   20 08:00


 


Pulse  102 H  20 08:00


 


Resp  24 H  20 08:00


 


BP  180/81 H  20 08:00


 


Pulse Ox  96   20 08:00











Weight: 113.489 kg





- Exam


Quality Assessment: Supplemental Oxygen


General: Alert, Oriented, Mild Distress


HEENT: PERRLA, EOMI


Lungs: Decreased Breath Sounds, Wheezing


Cardiovascular: Regular Rate


GI/Abdominal Exam: Normal Bowel Sounds


Extremities: Normal Inspection


Neurological: Cranial Nerves Intact


Neuro Extensive - Mental Status: Alert, Oriented x3, Normal Mood/Affect





- Patient Data


Lab Results Last 24 hrs: 


                         Laboratory Results - last 24 hr











  20 Range/Units





  18:29 


 


SARS-CoV-2 RNA (MAKAYLA)  Negative  (NEGATIVE)  














Sepsis Event Note





- Evaluation


Sepsis Screening Result: No Definite Risk





- Focused Exam


Vital Signs: 


                                   Vital Signs











  Temp Pulse Resp BP BP Pulse Ox


 


 20 08:00  36.2 C  102 H  24 H  180/81 H  180/81 H  96


 


 20 07:59     180/81 H  


 


 20 02:58  36.1 C  97  20   148/64 H  98


 


 20 00:40    28 H   


 


 20 23:00   103 H  36 H    93 L














- Problem List


(1) Acute exacerbation of COPD with asthma


SNOMED Code(s): 5701650812150


   ICD Code: J44.1 - CHRONIC OBSTRUCTIVE PULMONARY DISEASE W (ACUTE) 

EXACERBATION; J45.901 - UNSPECIFIED ASTHMA WITH (ACUTE) EXACERBATION   Status: 

Acute   Priority: High   


Problem List Initiated/Reviewed/Updated: Yes


Orders Last 24hrs: 


                               Active Orders 24 hr











 Category Date Time Status


 


 Patient Status [ADT] Routine ADT  20 17:15 Active


 


 Oxygen Therapy [RC] 1730 Care  20 17:15 Active


 


 RT Aerosol Therapy [RC] ASDIRECTED Care  20 21:19 Active


 


 VTE/DVT Education [RC] DAILY Care  20 17:15 Active


 


 Vital Signs [RC] Q4H Care  20 17:15 Active


 


 Consistent Carbohydrate Diet [DIET] Diet  20 Breakfast Ordered


 


 CULTURE MRSA SURVEY [RM] Routine Lab  20 18:36 Received


 


 CULTURE MRSA SURVEY [RM] Routine Lab  20 19:21 Ordered


 


 Acetaminophen [Tylenol Arthritis Pain] Med  20 21:14 Active





 650 mg PO Q8H PRN   


 


 Albuterol [Proventil Neb Soln] Med  20 17:30 Active





 2.5 mg NEB Q4H   


 


 Albuterol [Ventolin HFA] Med  20 20:48 Active





 0 gm INH Q4H PRN   


 


 Albuterol/Ipratropium [DuoNeb 3.0-0.5 MG/3 ML] Med  20 21:14 Active





 3 ml NEB Q4H PRN   


 


 Aspirin [Halfprin] Med  20 08:00 Active





 81 mg PO DAILY   


 


 Azithromycin [Zithromax] Med  20 10:30 Active





 500 mg PO Q24H   


 


 Baclofen [Lioresal] Med  20 08:00 Active





 10 mg PO BID   


 


 Dicyclomine [Bentyl] Med  20 21:14 Active





 10 mg PO TID PRN   


 


 Enoxaparin [Lovenox] Med  20 10:30 Ordered





 80 mg SUBCUT DAILY   


 


 Furosemide [Lasix] Med  20 12:00 Active





 40 mg PO DAILY@1200   


 


 Losartan [Cozaar] Med  20 08:00 Active





 25 mg PO DAILY   


 


 Mirtazapine [Remeron] Med  20 20:00 Active





 30 mg PO BEDTIME   


 


 Mometasone/Formoterol [Dulera 200-5 MCG] Med  20 21:00 Active





 0 puff IH BID   


 


 Montelukast [Singulair] Med  20 21:00 Active





 10 mg PO BEDTIME   


 


 Nicotine [Habitrol] Med  20 17:30 Active





 21 mg TRDERM DAILY   


 


 Omeprazole Med  20 07:00 Active





 20 mg PO ACBREAKFAST   


 


 QUEtiapine Fumarate [Quetiapine Fumarate] Med  20 20:00 Active





 400 mg PO QPM   


 


 QUEtiapine [SEROquel] Med  20 08:00 Active





 100 mg PO QAM   


 


 QUEtiapine [SEROquel] Med  20 21:14 Active





 50 mg PO TID PRN   


 


 Risperidone [Risperdal] Med  20 20:45 Active





 5 mg PO BEDTIME   


 


 SUMAtriptan [Imitrex] Med  20 05:13 Active





 25 mg PO Q2H PRN   


 


 Sitagliptin Phos/Metformin Hcl [Janumet 50-1,000 Mg] Med  20 20:30 Active





 1 tab PO BIDMEALS   


 


 Sodium Chloride 0.9% [Normal Saline] 500 ml Med  20 17:15 Active





 IV ASDIRECTED   


 


 Spironolactone [Aldactone] Med  20 08:00 Active





 50 mg PO BID@0800,1200   


 


 Topiramate [Topamax] Med  20 20:00 Active





 25 mg PO BID   


 


 amLODIPine [Norvasc] Med  20 08:00 Active





 2.5 mg PO DAILY   


 


 atorvaSTATin [Lipitor] Med  20 20:00 Active





 20 mg PO BEDTIME   


 


 methylPREDNISolone Sod Succ [Solu-MEDROL] Med  20 17:30 Active





 40 mg IVPUSH Q8H   


 


 rOPINIRole [Requip] Med  20 20:00 Active





 0.5 mg PO BEDTIME   


 


 Resuscitation Status Routine Resus Stat  20 17:15 Ordered








                                Medication Orders





Acetaminophen (Tylenol Arthritis Pain)  650 mg PO Q8H PRN


   PRN Reason: Pain


Albuterol (Proventil Neb Soln)  2.5 mg NEB Q4H [facility]


   Last Admin: 20 08:45  Dose: 2.5 mg


   Documented by: JUAN MANUEL


   Admin: 20 05:30  Dose: 2.5 mg


   Documented by: JUAN MANUEL


   Admin: 20 02:51  Dose: 2.5 mg


   Documented by: MARCELINA


   Admin: 20 20:45  Dose: 2.5 mg


   Documented by: MARCELINA


   Admin: 20 20:11 Dose:  Not Given


   Documented by: MARCELINA


Albuterol (Ventolin Hfa)  0 gm INH Q4H PRN


   PRN Reason: Dyspnea


Albuterol/Ipratropium (Duoneb 3.0-0.5 Mg/3 Ml)  3 ml NEB Q4H PRN


   PRN Reason: Shortness of Breath


Amlodipine Besylate (Norvasc)  2.5 mg PO DAILY [facility]


   Last Admin: 20 08:00  Dose: 2.5 mg


   Documented by: JUAN MANUEL


Aspirin (Halfprin)  81 mg PO DAILY [facility]


   Last Admin: 20 07:59  Dose: 81 mg


   Documented by: JUAN MANUEL


Atorvastatin Calcium (Lipitor)  20 mg PO BEDTIME YADIRA


Azithromycin (Zithromax)  500 mg PO Q24H YADIRA


Baclofen (Lioresal)  10 mg PO BID [facility]


   Last Admin: 20 07:59  Dose: 10 mg


   Documented by: WEEMAMY


Dicyclomine HCl (Bentyl)  10 mg PO TID PRN


   PRN Reason: Abdominal Pain


Enoxaparin Sodium (Lovenox)  80 mg SUBCUT DAILY [facility]


Furosemide (Lasix)  40 mg PO DAILY@1200 [facility]


Sodium Chloride (Normal Saline)  500 mls @ 30 mls/hr IV ASDIRECTED [facility]


   Last Admin: 20 17:17  Dose: 30 mls/hr


   Documented by: MICKIE


Losartan Potassium (Cozaar)  25 mg PO DAILY [facility]


   Last Admin: 20 07:59  Dose: 25 mg


   Documented by: JUAN MANUEL


Methylprednisolone Sodium Succinate (Solu-Medrol)  40 mg IVPUSH Q8H [facility]


   Last Admin: 20 08:45  Dose: 40 mg


   Documented by: JUAN MANUEL


   Admin: 20 02:29  Dose: 40 mg


   Documented by: MARCELINA


   Admin: 20 19:36  Dose: 40 mg


   Documented by: JUAN MANUEL


Mirtazapine (Remeron)  30 mg PO BEDTIME [facility]


   Last Admin: 20 20:42  Dose: 30 mg


   Documented by: MARCELINA


Mometasone Furoate/Formoterol Fumar (Dulera 200-5 Mcg)  0 puff IH BID [facility]


   Last Admin: 20 07:57  Dose: 2 puff


   Documented by: JUAN MANUEL


   Admin: 20 22:17 Dose:  Not Given


   Documented by: MARCELINA


Montelukast Sodium (Singulair)  10 mg PO BEDTIME [facility]


   Last Admin: 20 21:08  Dose: 10 mg


   Documented by: MARCELINA


Nicotine (Habitrol)  21 mg TRDERM DAILY [facility]


   Last Admin: 20 07:58  Dose: 21 mg


   Documented by: JUAN MANUEL


   Admin: 20 19:36  Dose: 21 mg


   Documented by: JUAN MANUEL


Sitagliptin Phos/Metformin Hcl [ Janumet 50-1,000 Mg] Tab*Own Med*  1 tab PO 

BIDMEALS [facility]


   Last Admin: 20 08:44  Dose: 1 tab


   Documented by: JUAN MANUEL


   Admin: 20 20:43  Dose: 1 tab


   Documented by: MARCELINA


Risperidone ( Risperdal) 2 Mg Tab* *Own Med**  5 mg PO BEDTIME [facility]


   Last Admin: 20 20:51  Dose: 5 mg


   Documented by: MARCELINA


Omeprazole (Omeprazole)  20 mg PO ACBREAKFAST [facility]


   Last Admin: 20 08:01 Dose:  Not Given


   Documented by: JUAN MANUEL


   Admin: 20 05:34  Dose: 20 mg


   Documented by: MARCELINA


Quetiapine Fumarate (Quetiapine Fumarate)  400 mg PO QPM [facility]


   Last Admin: 20 20:43  Dose: 400 mg


   Documented by: MARCELINA


Quetiapine Fumarate (Seroquel)  100 mg PO QAM [facility]


   Last Admin: 20 07:59  Dose: 100 mg


   Documented by: JUAN MANUEL


Quetiapine Fumarate (Seroquel)  50 mg PO TID PRN


   PRN Reason: Anxiety


   Last Admin: 20 21:30  Dose: 150 mg


   Documented by: MARCELINA


Ropinirole HCl (Requip)  0.5 mg PO BEDTIME [facility]


Spironolactone (Aldactone)  50 mg PO BID@0800,1200 [facility]


   Last Admin: 20 08:00  Dose: 50 mg


   Documented by: JUAN MANUEL


Sumatriptan Succinate (Imitrex)  25 mg PO Q2H PRN


   PRN Reason: Headache


   Last Admin: 20 05:34  Dose: 25 mg


   Documented by: MARCELINA


Topiramate (Topamax)  25 mg PO BID [facility]


   Last Admin: 20 08:01  Dose: 25 mg


   Documented by: JUAN MANUEL


   Admin: 20 20:42  Dose: 25 mg


   Documented by: MARCELINA








Assessment/Plan Comment:: 





COPD exacerbation - Duoneb q4 hrs, IV solumedrol 62.5mg q12, azithromycin 500mg 

PO, O2 supplement as needed for hypoxia


asthma exacerbation - as above


nicotine addiction - nicotine patch. education 


DVT ppx

## 2020-12-28 VITALS — DIASTOLIC BLOOD PRESSURE: 67 MMHG | SYSTOLIC BLOOD PRESSURE: 161 MMHG

## 2020-12-28 VITALS — HEART RATE: 93 BPM

## 2020-12-28 RX ADMIN — ALBUTEROL SULFATE SCH MG: 2.5 SOLUTION RESPIRATORY (INHALATION) at 08:41

## 2020-12-28 RX ADMIN — ALBUTEROL SULFATE SCH MG: 2.5 SOLUTION RESPIRATORY (INHALATION) at 05:08

## 2020-12-28 RX ADMIN — OMEPRAZOLE SCH MG: 20 CAPSULE, DELAYED RELEASE ORAL at 05:08

## 2020-12-28 RX ADMIN — METHYLPREDNISOLONE SODIUM SUCCINATE SCH MG: 40 INJECTION, POWDER, FOR SOLUTION INTRAMUSCULAR; INTRAVENOUS at 02:22

## 2020-12-28 RX ADMIN — METHYLPREDNISOLONE SODIUM SUCCINATE SCH MG: 40 INJECTION, POWDER, FOR SOLUTION INTRAMUSCULAR; INTRAVENOUS at 08:41

## 2020-12-28 RX ADMIN — OMEPRAZOLE SCH: 20 CAPSULE, DELAYED RELEASE ORAL at 06:17

## 2020-12-28 RX ADMIN — ALOGLIPTIN SCH TAB: 25 TABLET, FILM COATED ORAL at 07:48

## 2020-12-28 RX ADMIN — MOMETASONE FUROATE AND FORMOTEROL FUMARATE DIHYDRATE SCH PUFF: 200; 5 AEROSOL RESPIRATORY (INHALATION) at 07:45

## 2020-12-28 RX ADMIN — ALBUTEROL SULFATE SCH: 2.5 SOLUTION RESPIRATORY (INHALATION) at 03:12

## 2020-12-28 NOTE — CR
Date of Service:  12/26/20

Clinical Data:  SOB



AP CHEST:  



The heart size is normal.  



The lungs are clear.  No pneumothorax.  No pleural effusions.  



No evidence of acute intrathoracic disease.  



[id number]

[facility]

## 2020-12-28 NOTE — [id number]
**Discharge Summary





- Hospital Course


Brief History: h/o severe COPD, asthma and nicotine addiction with recurrent 

hospitlizations for flareups.  was addmited to the floor for resp stabilization.

Duoneb q4 hrs, IV corticosteroids, and Oral Abx. was also placed on O2 for 

hypoxia. She was noted to do well while at rest, but get severly dyspneic after 

exertion. This has improved after 48 hrs of treatment. Reports she is able to 

move easier with less SOB. not rquiring O2 anymore and less neb treatments.


Diagnosis: Stroke: No


Modified [place] Scale: No Signif.Disability Despite Sympt.Able to Carry Out 

Usual Act./Duties


Modified [place] Scale Score: 1





- Discharge Data


Discharge Date: 12/28/20


Discharge Disposition: Home, Self-Care 01


Condition: Good





- Referral to Home Health


Primary Care Physician: 


PCP None








- Discharge Diagnosis/Problem(s)


(1) Acute exacerbation of COPD with asthma


SNOMED Code(s): 6799723124799


   ICD Code: J44.1 - CHRONIC OBSTRUCTIVE PULMONARY DISEASE W (ACUTE) 

EXACERBATION; J45.901 - UNSPECIFIED ASTHMA WITH (ACUTE) EXACERBATION   Status: 

Acute   Priority: High   





- Patient Instructions


Diet: Heart Healthy Diet


Driving: [place] Drive Today


Showering/Bathing: May Shower


Notify Provider of: Fever





- Discharge Plan


*PRESCRIPTION DRUG MONITORING PROGRAM REVIEWED*: Not Applicable


*COPY OF PRESCRIPTION DRUG MONITORING REPORT IN PATIENT JAE: Not Applicable


Prescriptions/Med Rec: 


Nicotine [Habitrol] 21 mg TRDERM DAILY #14 patch


amLODIPine [Norvasc] 2.5 mg PO DAILY #30 tablet


predniSONE [Prednisone] 10 mg PO DAILY #3 tablet


predniSONE [Prednisone] 20 mg PO DAILY #3 tablet


predniSONE [Prednisone] 5 mg PO DAILY #3 tablet


Albuterol [Proventil Neb Soln] 2.5 mg NEB Q4H #60 neb


Azithromycin [Zithromax] 500 mg PO Q24H #4 tablet


Home Medications: 


                                    Home Meds





Omeprazole 20 mg PO BIDAC 09/29/13 [History]


QUEtiapine [SEROquel] 100 mg PO QAM 11/21/15 [History]


QUEtiapine Fumarate [Quetiapine Fumarate] 400 mg PO QPM 12/10/17 [History]


Calcium Carbonate/Vitamin D3 [Calcium 600Mg-D3 400 Unit Sfgl] 1 tab PO BID 

11/28/18 [History]


Ubidecarenone [Co Q-10] 200 mg PO BEDTIME 03/21/19 [History]


Mirtazapine [Remeron] 1 - 2 tab PO BEDTIME 07/12/19 [History]


Albuterol Sulfate [Proair Digihaler] 2 inh INH Q4HR PRN 07/06/20 [History]


Budesonide/Formoterol [Symbicort 160-4.5 MCG] 2 inh INH BID 07/06/20 [History]


Topiramate 25 mg PO BID 07/06/20 [History]


Varenicline [Chantix] 1 mg PO DAILY 07/06/20 [History]


ondansetron HCL [Zofran] 4 mg PO Q4HR PRN 07/06/20 [History]


risperiDONE [Risperdal] 5 mg PO BEDTIME 07/22/20 [History]


Aspirin [Halfprin] 81 mg PO DAILY 09/20/20 [History]


Losartan [Cozaar] 12.5 mg PO DAILY 09/20/20 [History]


Montelukast [Singulair] 10 mg PO BEDTIME 09/20/20 [History]


SUMAtriptan [Imitrex] 25 mg PO ASDIRECTED PRN 09/20/20 [History]


sitaGLIPtin Phos/Metformin HCl [Janumet 50-1,000 MG] 1 tab PO BID 09/20/20 

[History]


Albuterol/Ipratropium [DuoNeb 3.0-0.5 MG/3 ML] 3 ml NEB Q4H  neb 09/21/20 [Rx]


Furosemide 40 mg PO DAILY #60 09/21/20 [Rx]


Acetaminophen [Tylenol Arthritis] 1 - 2 tab PO Q8H PRN 12/26/20 [History]


Baclofen 10 mg PO BID 12/26/20 [History]


Dicyclomine [Bentyl] 10 mg PO TID PRN 12/26/20 [History]


Docusate Sodium [Dulcolax Stool Softener] 100 mg PO BID PRN 12/26/20 [History]


Ipratropium/Albuterol Sulfate [Iprat-Albut 0.5-3(2.5) MG/3 ML] 3 ml IH Q4H PRN 

12/26/20 [History]


Ketorolac [Toradol] 10 mg PO TID PRN 12/26/20 [History]


Meloxicam [Mobic] 7.5 mg PO ASDIRECTED PRN 12/26/20 [History]


QUEtiapine [SEROquel] 50 mg PO TID PRN 12/26/20 [History]


Spironolactone [Aldactone] 2 tab PO BID 12/26/20 [History]


atorvaSTATin [Lipitor] 20 mg PO BEDTIME 12/26/20 [History]


rOPINIRole [Requip] 0.5 mg PO BEDTIME 12/26/20 [History]


Albuterol [Proventil Neb Soln] 2.5 mg NEB Q4H #60 neb 12/28/20 [Rx]


Azithromycin [Zithromax] 500 mg PO Q24H #4 tablet 12/28/20 [Rx]


Nicotine [Habitrol] 21 mg TRDERM DAILY #14 patch 12/28/20 [Rx]


QUEtiapine [SEROquel] 50 mg PO TID PRN  tablet 12/28/20 [Rx]


QUEtiapine [SEROquel] 100 mg PO QAM  tablet 12/28/20 [Rx]


SitaGLIPtin [Januvia] 50 mg PO BIDMEALS  tablet 12/28/20 [Rx]


Spironolactone [Aldactone] 50 mg PO BID@0800,1200  tablet 12/28/20 [Rx]


amLODIPine [Norvasc] 2.5 mg PO DAILY #30 tablet 12/28/20 [Rx]


predniSONE [Prednisone] 5 mg PO DAILY #3 tablet 12/28/20 [Rx]


predniSONE [Prednisone] 10 mg PO DAILY #3 tablet 12/28/20 [Rx]


predniSONE [Prednisone] 20 mg PO DAILY #3 tablet 12/28/20 [Rx]


rOPINIRole [Requip] 0.5 mg PO BEDTIME  tablet 12/28/20 [Rx]


risperiDONE [RisperiDAL] 5 mg PO BEDTIME  tablet 12/28/20 [Rx]








Other Amb Orders: 


DME for Discharge [COMM] Location: None Selected


Patient Handouts:  Chronic Obstructive Pulmonary Disease Exacerbation, 

Easy-to-Read, Coping with Quitting Smoking, Health Risks of Smoking, 

Azithromycin tablets, Amlodipine tablets, Nicotine skin patches, Prednisone 

tablets, Steps to Quit Smoking


Forms:  ED Department Discharge





- Discharge Summary/Plan Comment


DC Time >30 min.: Yes


Discharge Summary/Plan Comment: 





to be d/cd home on a new neb machine, corticosteroids, and oral ABX.


Also was noted to be hypertensive for which lisinopril was started at 2.5mg PO 

daily


f/u w PCP in 1-2 weeks 





- General Info


Date of Service: 12/28/20


Functional Status: Reports: Tolerating Diet, Ambulating, Incentive Spirometry





- Review of Systems


General: Reports: No Symptoms


HEENT: Reports: No Symptoms


Pulmonary: Reports: Shortness of Breath (improved)


Cardiovascular: Reports: No Symptoms


Gastrointestinal: Reports: No Symptoms


Genitourinary: Reports: No Symptoms


Musculoskeletal: Reports: No Symptoms


Skin: Reports: No Symptoms


Neurological: Reports: No Symptoms





- Patient Data


Vitals - Most Recent: 


                                Last Vital Signs











Temp  36.2 C   12/28/20 08:00


 


Pulse  93   12/28/20 08:00


 


Resp  18   12/28/20 08:00


 


BP  161/67 H  12/28/20 08:00


 


Pulse Ox  97   12/28/20 08:00











Weight - Most Recent: 110.858 kg


I&O - Last 24 hours: 


                                 Intake & Output











 12/27/20 12/28/20 12/28/20





 22:59 06:59 14:59


 


Intake Total 1600 840 


 


Output Total 2500 750 


 


Balance -900 90 











LUCRECIA Results - Last 24 hrs: 


                                  Microbiology











 12/26/20 18:36 MRSA Surveillance Culture - Final





 Axilla, Left    NO MRSA ISOLATED











Med Orders - Current: 


                               Current Medications








Discontinued Medications





Acetaminophen (Tylenol)  650 mg PO Q8HR On license of [facility]


   Last Admin: 12/26/20 21:30 Dose:  650 mg


   Documented by: 


Acetaminophen (Tylenol Arthritis Pain)  650 mg PO Q8H PRN


   PRN Reason: Pain


Albuterol (Proventil Neb Soln)  2.5 mg NEB ONETIME ONE


   Stop: 12/26/20 16:26


   Last Admin: 12/26/20 16:10 Dose:  2.5 mg


   Documented by: 


Albuterol (Proventil Neb Soln)  2.5 mg NEB Q4H YADIRA


   Last Admin: 12/28/20 08:41 Dose:  2.5 mg


   Documented by: 


Albuterol (Ventolin Hfa)  0 gm INH Q4H PRN


   PRN Reason: Dyspnea


Albuterol/Ipratropium (Duoneb 3.0-0.5 Mg/3 Ml)  3 ml NEB QID PRN


   PRN Reason: Shortness Of Breath/wheezing


Albuterol/Ipratropium (Duoneb 3.0-0.5 Mg/3 Ml)  3 ml NEB Q4H On license of [facility]


   Last Admin: 12/26/20 21:13 Dose:  3 ml


   Documented by: 


Albuterol/Ipratropium (Duoneb 3.0-0.5 Mg/3 Ml)  3 ml NEB Q4H PRN


   PRN Reason: Shortness of Breath


Amlodipine Besylate (Norvasc)  2.5 mg PO DAILY On license of [facility]


   Last Admin: 12/28/20 07:46 Dose:  2.5 mg


   Documented by: 


Aspirin (Halfprin)  81 mg PO DAILY On license of [facility]


   Last Admin: 12/28/20 07:47 Dose:  81 mg


   Documented by: 


Atorvastatin Calcium (Lipitor)  20 mg PO BEDTIME On license of [facility]


   Last Admin: 12/27/20 19:18 Dose:  20 mg


   Documented by: 


Azithromycin (Zithromax)  500 mg PO Q24H On license of [facility]


   Last Admin: 12/28/20 10:30 Dose:  500 mg


   Documented by: 


Baclofen (Lioresal)  10 mg PO BID On license of [facility]


   Last Admin: 12/28/20 07:46 Dose:  10 mg


   Documented by: 


Budesonide (Pulmicort)  0.5 mg NEB ONETIME ONE


   Stop: 12/26/20 16:27


   Last Admin: 12/26/20 16:46 Dose:  0.5 mg


   Documented by: 


Budesonide (Pulmicort) Confirm Administered Dose 0.5 mg .ROUTE .STK-MED ONE


   Stop: 12/26/20 16:55


   Last Admin: 12/26/20 17:16 Dose:  Not Given


   Documented by: 


Dicyclomine HCl (Bentyl)  10 mg PO TID PRN


   PRN Reason: Abdominal Pain


Enoxaparin Sodium (Lovenox)  80 mg SUBCUT DAILY On license of [facility]


   Last Admin: 12/27/20 11:05 Dose:  80 mg


   Documented by: 


Enoxaparin Sodium (Lovenox)  60 mg SUBCUT DAILY On license of [facility]


   Last Admin: 12/28/20 08:41 Dose:  60 mg


   Documented by: 


Furosemide (Lasix)  40 mg PO DAILY On license of [facility]


Furosemide (Lasix)  40 mg PO DAILY@1200 On license of [facility]


   Last Admin: 12/27/20 11:58 Dose:  40 mg


   Documented by: 


Magnesium Sulfate/Dextrose (Magnesium Sulfate In D5w 100 Premix) Confirm 

Administered Dose 1 gm in 100 mls @ as directed .ROUTE .STK-MED ONE


   Stop: 12/26/20 16:58


   Last Admin: 12/26/20 17:16 Dose:  Not Given


   Documented by: 


Sodium Chloride (Normal Saline)  500 mls @ 30 mls/hr IV ASDIRECTED On license of [facility]


   Last Admin: 12/26/20 17:17 Dose:  30 mls/hr


   Documented by: 


Lorazepam (Ativan) Confirm Administered Dose 2 mg .ROUTE .STK-MED ONE


   Stop: 12/26/20 23:09


   Last Admin: 12/26/20 23:17 Dose:  Not Given


   Documented by: 


Lorazepam (Ativan)  1 mg IVPUSH ONETIME ONE


   Stop: 12/26/20 23:06


   Last Admin: 12/26/20 23:13 Dose:  1 mg


   Documented by: 


Losartan Potassium (Cozaar)  25 mg PO DAILY On license of [facility]


   Last Admin: 12/28/20 07:46 Dose:  25 mg


   Documented by: 


Magnesium Sulfate/Dextrose (Magnesium Sulfate In D5w 100 Premix)  1 gm IV NOW 

STA


   Stop: 12/26/20 16:26


   Last Admin: 12/26/20 17:14 Dose:  1 gm


   Documented by: 


Metformin HCl (Glucophage Xr)  1,000 mg PO DAILY On license of [facility]


Metformin HCl (Glucophage)  1,000 mg PO BIDMEALS On license of [facility]


Methylprednisolone Sodium Succinate (Solu-Medrol)  40 mg IVPUSH Q8H On license of [facility]


   Last Admin: 12/28/20 08:41 Dose:  40 mg


   Documented by: 


Mirtazapine (Remeron)  30 mg PO BEDTIME On license of [facility]


   Last Admin: 12/27/20 19:19 Dose:  30 mg


   Documented by: 


Mometasone Furoate/Formoterol Fumar (Dulera 200-5 Mcg)  0 puff IH BID On license of [facility]


   Last Admin: 12/28/20 07:45 Dose:  2 puff


   Documented by: 


Montelukast Sodium (Singulair)  10 mg PO BEDTIME On license of [facility]


   Last Admin: 12/27/20 19:21 Dose:  10 mg


   Documented by: 


Nicotine (Habitrol)  21 mg TRDERM DAILY On license of [facility]


   Last Admin: 12/28/20 07:45 Dose:  21 mg


   Documented by: 


Non-Formulary Medication (Albuterol Sulfate [Proair Digihaler])  2 inh INH Q4HR 

PRN


   PRN Reason: Dyspnea


Non-Formulary Medication (Budesonide/Formoterol [Symbicort 160-4.5 Mcg])  2 inh 

INH BID On license of [facility]


   Last Admin: 12/26/20 22:19 Dose:  Not Given


   Documented by: 


Non-Formulary Medication (Montelukast [Singulair])  10 mg PO DAILY On license of [facility]


Non-Formulary Medication (Ondansetron Hcl [Zofran])  4 mg PO Q4HR PRN


   PRN Reason: Nausea


Non-Formulary Medication (Pramipexole Di-Hcl [Pramipexole Dihydrochloride])  

0.25 mg PO DAILY On license of [facility]


Non-Formulary Medication (Quetiapine [Seroquel])  100 mg PO QAM On license of [facility]


Non-Formulary Medication (Risperidone [Risperdal])  5 mg PO BEDTIME On license of [facility]


   Last Admin: 12/26/20 22:19 Dose:  Not Given


   Documented by: 


Non-Formulary Medication (Sitagliptin Phos/Metformin Hcl [Janumet 50-1,000 Mg]) 

50 tab PO BID On license of [facility]


   Last Admin: 12/26/20 22:19 Dose:  Not Given


   Documented by: 


Sitagliptin Phos/Metformin Hcl [ Janumet 50-1,000 Mg] Tab*Own Med*  1 tab PO 

BIDMEALS On license of [facility]


   Last Admin: 12/28/20 07:48 Dose:  1 tab


   Documented by: 


Risperidone ( Risperdal) 2 Mg Tab* *Own Med**  5 mg PO BEDTIME On license of [facility]


   Last Admin: 12/27/20 19:27 Dose:  5 mg


   Documented by: 


Omeprazole (Omeprazole)  20 mg PO ACBREAKFAST On license of [facility]


   Last Admin: 12/28/20 06:17 Dose:  Not Given


   Documented by: 


Ondansetron HCl (Zofran Odt)  4 mg PO Q4H PRN


   PRN Reason: Nausea


Quetiapine Fumarate (Quetiapine Fumarate)  400 mg PO QPM On license of [facility]


   Last Admin: 12/27/20 19:19 Dose:  400 mg


   Documented by: 


Quetiapine Fumarate (Seroquel)  100 mg PO QAM On license of [facility]


   Last Admin: 12/28/20 07:45 Dose:  100 mg


   Documented by: 


Quetiapine Fumarate (Seroquel) Confirm Administered Dose 150 mg .ROUTE .STK-MED 

ONE


   Stop: 12/26/20 21:06


   Last Admin: 12/26/20 21:13 Dose:  Not Given


   Documented by: 


Quetiapine Fumarate (Seroquel)  50 mg PO TID PRN


   PRN Reason: Anxiety


   Last Admin: 12/27/20 19:19 Dose:  150 mg


   Documented by: 


Risperidone (Risperidal)  5 mg PO BEDTIME On license of [facility]


Ropinirole HCl (Requip)  0.5 mg PO BEDTIME On license of [facility]


   Last Admin: 12/27/20 19:19 Dose:  0.5 mg


   Documented by: 


Sitagliptin Phosphate (Januvia)  50 mg PO BIDMEALS On license of [facility]


Sodium Chloride (Saline Flush)  10 ml FLUSH ASDIRECTED PRN


   PRN Reason: IV Use


   Last Admin: 12/27/20 20:53 Dose:  10 ml


   Documented by: 


Spironolactone (Aldactone)  75 mg PO BID On license of [facility]


   Last Admin: 12/26/20 22:18 Dose:  Not Given


   Documented by: 


Spironolactone (Aldactone)  50 mg PO BID@0800,1200 On license of [facility]


   Last Admin: 12/28/20 07:48 Dose:  50 mg


   Documented by: 


Spironolactone (Aldactone)  50 mg PO BID On license of [facility]


Sumatriptan Succinate (Imitrex)  25 mg PO Q2H PRN


   PRN Reason: Headache


   Last Admin: 12/27/20 20:55 Dose:  25 mg


   Documented by: 


Topiramate (Topamax)  25 mg PO BID On license of [facility]


   Last Admin: 12/28/20 07:47 Dose:  25 mg


   Documented by: 











- Exam


Quality Assessment: Reports: Supplemental Oxygen (resolved)


General: Reports: Alert, Oriented, Cooperative, No Acute Distress


HEENT: Reports: Pupils Equal


Lungs: Reports: Clear to Auscultation, Normal Respiratory Effort


Cardiovascular: Reports: Regular Rate, Regular Rhythm


GI/Abdominal Exam: Normal Bowel Sounds


Skin: Reports: Warm

## 2021-04-27 ENCOUNTER — HOSPITAL ENCOUNTER (EMERGENCY)
Dept: HOSPITAL 60 - LB.ED | Age: 56
Discharge: HOME | End: 2021-04-27
Payer: MEDICARE

## 2021-04-27 VITALS — HEART RATE: 78 BPM | DIASTOLIC BLOOD PRESSURE: 85 MMHG | SYSTOLIC BLOOD PRESSURE: 144 MMHG

## 2021-04-27 DIAGNOSIS — J45.901: Primary | ICD-10-CM

## 2021-04-27 PROCEDURE — 85025 COMPLETE CBC W/AUTO DIFF WBC: CPT

## 2021-04-27 PROCEDURE — 80053 COMPREHEN METABOLIC PANEL: CPT

## 2021-04-27 PROCEDURE — 99285 EMERGENCY DEPT VISIT HI MDM: CPT

## 2021-04-27 PROCEDURE — 36415 COLL VENOUS BLD VENIPUNCTURE: CPT

## 2021-04-27 PROCEDURE — 96372 THER/PROPH/DIAG INJ SC/IM: CPT

## 2021-04-27 PROCEDURE — 71045 X-RAY EXAM CHEST 1 VIEW: CPT

## 2021-04-27 RX ADMIN — BUDESONIDE ONE MG: 0.25 SUSPENSION RESPIRATORY (INHALATION) at 20:30

## 2021-04-28 NOTE — CR
DATE OF SERVICE:  04/27/21

CLINICAL DATA:  SOB.



PORTABLE CHEST:  



Comparison is made to a prior exam dated 12/26/20.



The heart size is normal.  The lungs are clear.  No pneumothorax.  No pleural 
effusions.



No evidence of acute intrathoracic disease.



[id number]

[facility]

## 2021-04-28 NOTE — ER
HISTORY OF PRESENT ILLNESS:  A 56-year-old lady who comes in with her  with

complaints of shortness of breath, and wheezing that started initially a couple of weeks

ago.  She has been on nebulizer treatments and she does take puffers as well and she was on

oral prednisone taper, which recently ended.  She was doing more work today, doing some

house cleaning activities and she noticed a definite shortness of breath and wheezing.  She

decided she needed to come in.  The patient denies any falls or injury.  She has not been

running a fever and she was doing fairly well until just a couple of days ago, but now today

it got worse.

 

OBJECTIVE:  GENERAL APPEARANCE:  The patient is awake and alert.  She has audible wheezing.

VITAL SIGNS:  Reviewed.  Blood pressure 144/85, O2 sats are 96% to 97% on room air, pulse

96.

LUNGS:  Significantly reduced air exchange with audible wheezing throughout.  I hear just a

little bit of rales in the bases as well with expiration.  SKIN:  Warm and dry.

 

INITIAL TREATMENT PLAN:  DuoNeb was given followed by Pulmicort neb treatment and a second

DuoNeb treatment.  This did help the patient's breathing in a mild-to-moderate fashion.  An

IV was attempted but unsuccessfully.  Therefore, she was given Solu-Medrol 125 mg IM.  Chest

x-ray was obtained with no acute findings.

 

LABORATORY DATA:  Labs include a CBC which is normal.  CMP shows a nonfasting blood glucose

of 403.  The patient states that she just ate shortly before coming in.

 

She is diabetic and does take insulin as well as oral diabetic medications plus she states

her recent course of prednisone usually bumps her blood sugar up as well.

 

TREATMENT:  We monitored the patient for about an hour and her wheezing significantly

improved.  At this time, there was no further audible wheezing, and listening to her lungs,

there was only a small amount of end-expiratory wheezes scattered throughout the lung

fields.  The patient states she feels a lot better and she is not concerned about her blood

sugar of 403.

 

DIAGNOSIS:  Asthma with exacerbation.

 

TREATMENT PLAN:  The patient is discharged home with her .  I will continue her on

Pulmicort treatments b.i.d. for 10 days, giving her a script for this and I will put her

back on an oral prednisone taper.  She will be given 20 mg tonight and then tomorrow start

with 20 mg b.i.d., tapering down over 10 days.  The patient is to follow up with her primary

care provider within a couple of days at least by phone,

sooner as needed, and she is to make sure she takes all of her diabetic medications as well.

The patient is comfortable with treatment plan and has no further questions.

 

 

SAM/RENAY

DD:  04/27/2021 21:50:20

DT:  04/28/2021 04:09:31

Job #:  [id number]

## 2021-05-15 ENCOUNTER — HOSPITAL ENCOUNTER (EMERGENCY)
Dept: HOSPITAL 60 - LB.ED | Age: 56
Discharge: HOME | End: 2021-05-15
Payer: MEDICARE

## 2021-05-15 VITALS — HEART RATE: 77 BPM | DIASTOLIC BLOOD PRESSURE: 66 MMHG | SYSTOLIC BLOOD PRESSURE: 119 MMHG

## 2021-05-15 DIAGNOSIS — S86.912A: Primary | ICD-10-CM

## 2021-05-15 DIAGNOSIS — Z88.5: ICD-10-CM

## 2021-05-15 DIAGNOSIS — J44.9: ICD-10-CM
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
## 2021-05-15 DIAGNOSIS — Z88.8: ICD-10-CM

## 2021-05-15 DIAGNOSIS — Z91.018: ICD-10-CM

## 2021-05-15 DIAGNOSIS — K21.9: ICD-10-CM

## 2021-05-15 DIAGNOSIS — E66.9: ICD-10-CM

## 2021-05-15 DIAGNOSIS — I10: ICD-10-CM

## 2021-05-15 DIAGNOSIS — E11.40: ICD-10-CM

## 2021-05-15 RX ADMIN — HYDROCODONE BITARTRATE AND ACETAMINOPHEN ONE TAB: 10; 325 TABLET ORAL at 08:26

## 2021-05-15 NOTE — EDM.PDOC
ED HPI GENERAL MEDICAL PROBLEM





- General


Stated Complaint: FELL / LEFT KNEE PAIN


Time Seen by Provider: 05/15/21 08:12


Source of Information: Reports: Patient


History Limitations: Reports: No Limitations





- History of Present Illness


INITIAL COMMENTS - FREE TEXT/NARRATIVE: 





56 year old female with PMH of HTN, COPD, anxiety, migraine, GERD, chronic pain,

presents with left knee pain after falling on it on Wednesday.  She has tried 

tylenol and her torodal without relief. 





- Related Data


                                    Allergies











Allergy/AdvReac Type Severity Reaction Status Date / Time


 


bisoprolol Allergy Severe Shortness Verified 20 13:18





   of Breath  


 


adhesive tape Allergy  Rash Verified 20 13:18


 


celecoxib [From Celebrex] Allergy  Nausea and Verified 20 18:57





   Vomiting  


 


pramipexole Allergy  Diaphoresis Verified 20 13:18


 


ketamine AdvReac Intermediate Agitation Verified 20 18:57











Home Meds: 


                                    Home Meds





Omeprazole 20 mg PO BIDAC 13 [History]


QUEtiapine [SEROquel] 100 mg PO QAM 11/21/15 [History]


QUEtiapine Fumarate [Quetiapine Fumarate] 400 mg PO QPM 12/10/17 [History]


Calcium Carbonate/Vitamin D3 [Calcium 600Mg-D3 400 Unit Sfgl] 1 tab PO BID 

18 [History]


Ubidecarenone [Co Q-10] 200 mg PO BEDTIME 19 [History]


Mirtazapine [Remeron] 1 - 2 tab PO BEDTIME 19 [History]


Albuterol Sulfate [Proair Digihaler] 2 inh INH Q4HR PRN 20 [History]


Budesonide/Formoterol [Symbicort 160-4.5 MCG] 2 inh INH BID 20 [History]


Topiramate 25 mg PO BID 20 [History]


Varenicline [Chantix] 1 mg PO DAILY 20 [History]


ondansetron HCL [Zofran] 4 mg PO Q4HR PRN 20 [History]


risperiDONE [Risperdal] 5 mg PO BEDTIME 20 [History]


Aspirin [Halfprin] 81 mg PO DAILY 20 [History]


Losartan [Cozaar] 12.5 mg PO DAILY 20 [History]


Montelukast [Singulair] 10 mg PO BEDTIME 20 [History]


SUMAtriptan [Imitrex] 25 mg PO ASDIRECTED PRN 20 [History]


sitaGLIPtin Phos/Metformin HCl [Janumet 50-1,000 MG] 1 tab PO BID 20 

[History]


Albuterol/Ipratropium [DuoNeb 3.0-0.5 MG/3 ML] 3 ml NEB Q4H  neb 20 [Rx]


Furosemide 40 mg PO DAILY #60 20 [Rx]


Acetaminophen [Tylenol Arthritis] 1 - 2 tab PO Q8H PRN 20 [History]


Baclofen 10 mg PO BID 20 [History]


Dicyclomine [Bentyl] 10 mg PO TID PRN 20 [History]


Docusate Sodium [Dulcolax Stool Softener] 100 mg PO BID PRN 20 [History]


Ipratropium/Albuterol Sulfate [Iprat-Albut 0.5-3(2.5) MG/3 ML] 3 ml IH Q4H PRN 

20 [History]


Ketorolac [Toradol] 10 mg PO TID PRN 20 [History]


Meloxicam [Mobic] 7.5 mg PO ASDIRECTED PRN 20 [History]


QUEtiapine [SEROquel] 50 mg PO TID PRN 20 [History]


Spironolactone [Aldactone] 2 tab PO BID 20 [History]


atorvaSTATin [Lipitor] 20 mg PO BEDTIME 20 [History]


rOPINIRole [Requip] 0.5 mg PO BEDTIME 20 [History]


Albuterol [Proventil Neb Soln] 2.5 mg NEB Q4H #60 neb 20 [Rx]


Azithromycin [Zithromax] 500 mg PO Q24H #4 tablet 20 [Rx]


Nicotine [Habitrol] 21 mg TRDERM DAILY #14 patch 20 [Rx]


QUEtiapine [SEROquel] 50 mg PO TID PRN  tablet 12/28/20 [Rx]


QUEtiapine [SEROquel] 100 mg PO QAM  tablet 20 [Rx]


SitaGLIPtin [Januvia] 50 mg PO BIDMEALS  tablet 20 [Rx]


Spironolactone [Aldactone] 50 mg PO BID@0800,1200  tablet 20 [Rx]


amLODIPine [Norvasc] 2.5 mg PO DAILY #30 tablet 20 [Rx]


predniSONE [Prednisone] 5 mg PO DAILY #3 tablet 20 [Rx]


predniSONE [Prednisone] 10 mg PO DAILY #3 tablet 20 [Rx]


predniSONE [Prednisone] 20 mg PO DAILY #3 tablet 20 [Rx]


rOPINIRole [Requip] 0.5 mg PO BEDTIME  tablet 20 [Rx]


risperiDONE [RisperiDAL] 5 mg PO BEDTIME  tablet 20 [Rx]











Past Medical History





- Past Health History


Medical/Surgical History: Denies Medical/Surgical History


HEENT History: Reports: Impaired Vision, Other (See Below)


Other HEENT History: glasses for reading,


Cardiovascular History: Reports: Heart Murmur, Hypertension


Other Cardiovascular History: states recently has been told juan jose has a valve 

problem but not bad enough for surgery


Respiratory History: Reports: Asthma, Bronchitis, Recurrent, COPD, Pneumonia, 

Recurrent, Other (See Below)


Other Respiratory History: Emphysema


Gastrointestinal History: Reports: Bowel Obstruction, GERD, Irritable Bowel 

Syndrome


Genitourinary History: Reports: None


OB/GYN History: Reports: Dysfunctional Uterine Bleeding, Pregnancy


Musculoskeletal History: Reports: Back Pain, Chronic


Other Musculoskeletal History: back / butt pain, shoots down left leg


Neurological History: Reports: Migraines, Neuropathy, Peripheral


Psychiatric History: Reports: Anxiety, Depression, Suicide Attempt, Suicidal 

Ideation


Other Psychiatric History: suicide attemp years ago, states got help and no long

 thinks of it


Endocrine/Metabolic History: Reports: Diabetes, Type II, Obesity/BMI 30+


Hematologic History: Reports: Blood Transfusion(s), Iron Deficiency


Oncologic (Cancer) History: Reports: None


Dermatologic History: Reports: Eczema, Other (See Below)


Other Dermatologic History: Allergy to paper tape





- Infectious Disease History


Infectious Disease History: Reports: Chicken Pox, Measles, Rubella





- Past Surgical History


HEENT Surgical History: Reports: Eye Surgery


Other HEENT Surgeries/Procedures: Eye Surgery, when 7 yr and grade school


Cardiovascular Surgical History: Reports: None


Respiratory Surgical History: Reports: None


GI Surgical History: Reports: None


Other GI Surgeries/Procedures: ABD. SURGERY AND SCAR TISSUE REMOVAL


Female  Surgical History: Reports:  Section, Hysterectomy, Other (See 

Below)


Other Female  Surgeries/Procedures: appendix


Endocrine Surgical History: Reports: None


Neurological Surgical History: Reports: None


Musculoskeletal Surgical History: Reports: None


Dermatological Surgical History: Reports: None





Social & Family History





- Family History


Family Medical History: No Pertinent Family History


Cardiac: Reports: None


Musculoskeletal: Reports: Arthritis, Back pain, Chronic


Neurological: Reports: None


Psychiatric: Reports: Depression


Endocrine/Metabolic: Reports: None


Oncologic: Reports: None





- Caffeine Use


Caffeine Use: Reports: Coffee, Soda


Other Caffeine Use: 2 cups a day


Caffeine Use Comment: 2 cups of coffee per day 1 soda per day





Review of Systems





- Review of Systems


Review Of Systems: See Below


Constitutional: Reports: No Symptoms


Eyes: Reports: No Symptoms


Ears: Reports: No Symptoms


Nose: Reports: No Symptoms


Mouth/Throat: Reports: No Symptoms


Respiratory: Reports: No Symptoms


Cardiovascular: Reports: No Symptoms


GI/Abdominal: Reports: No Symptoms


Genitourinary: Reports: No Symptoms


Musculoskeletal: Reports: Joint Pain


Skin: Reports: Other (healing abrasion noted to left knee)


Neurological: Reports: No Symptoms


Psychiatric: Reports: Anxiety





ED EXAM, GENERAL





- Physical Exam


Exam: See Below


Exam Limited By: No Limitations


General Appearance: Alert, Mild Distress


Ears: Hearing Grossly Normal


Throat/Mouth: Normal Voice, No Airway Compromise


Head: Atraumatic


Neck: Non-Tender, Full Range of Motion


Respiratory/Chest: No Respiratory Distress, Lungs Clear, Normal Breath Sounds


Cardiovascular: Normal Peripheral Pulses, Regular Rate, Rhythm, No Edema, No 

Murmur


Peripheral Pulses: 3+: Posterior Tibial (L), Posterior Tibial (R), Dorsalis 

Pedis (L), Dorsalis Pedis (R)


GI/Abdominal: Non-Tender


Back Exam: Normal Inspection, Full Range of Motion


Extremities: Normal Inspection, Normal Range of Motion, No Pedal Edema, Normal 

Capillary Refill, Leg Pain


Neurological: Alert, Oriented, Normal Gait, No Motor/Sensory Deficits, 

Sensory/Motor Deficit


Psychiatric: Normal Affect


Skin Exam: Warm, Dry, Normal Color, No Rash





Course





- Vital Signs


Last Recorded V/S: 


                                Last Vital Signs











Temp  98.0 F   05/15/21 08:34


 


Pulse  77   05/15/21 08:34


 


Resp  16   05/15/21 08:34


 


BP  119/66   05/15/21 08:34


 


Pulse Ox  99   05/15/21 08:34














- Orders/Labs/Meds


Orders: 


                               Active Orders 24 hr











 Category Date Time Status


 


 Knee 3V Lt [CR] Stat Exams  05/15/21 08:18 Taken











Meds: 


Medications














Discontinued Medications














Generic Name Dose Route Start Last Admin





  Trade Name Maite  PRN Reason Stop Dose Admin


 


Hydrocodone Bitart/Acetaminophen  1 tab  05/15/21 08:18  05/15/21 08:26





  Acetaminophen/Hydrocodone 325-10 Mg Tab  PO  05/15/21 08:19  1 tab





  ONETIME ONE   Administration


 


Hydrocodone Bitart/Acetaminophen  Confirm  05/15/21 08:34 





  Acetaminophen/Hydrocodone 325-10 Mg Tab  Administered  05/15/21 08:35 





  Dose  





  1 tab  





  .ROUTE  





  .STK-MED ONE  














Departure





- Departure


Time of Disposition: 09:31


Disposition: Home, Self-Care 01


Clinical Impression: 


Strain of left knee


Qualifiers:


 Encounter type: initial encounter Qualified Code(s): S86.912A - Strain of 

unspecified muscle(s) and tendon(s) at lower leg level, left leg, initial 

encounter








- Discharge Information


*PRESCRIPTION DRUG MONITORING PROGRAM REVIEWED*: Not Applicable


*COPY OF PRESCRIPTION DRUG MONITORING REPORT IN PATIENT JAE: Not Applicable


Additional Instructions: 


Take 600mg ibuprofen with food every 6 hours and 650 mg of tylenol every 6 hours

 as needed for pain.  DO NOT take toradol AND ibuprofen.  You may take tylenol 

with the ibuprofen.  Weight bear as tolerated, follow up at clinic next week if 

the pain persists.  Return to ED for any increased or new concerning symptoms. 





Sepsis Event Note (ED)





- Focused Exam


Vital Signs: 


                                   Vital Signs











  Temp Pulse Resp BP Pulse Ox


 


 05/15/21 08:34  98.0 F  77  16  119/66  99














- My Orders


Last 24 Hours: 


My Active Orders





05/15/21 08:18


Knee 3V Lt [CR] Stat 














- Assessment/Plan


Last 24 Hours: 


My Active Orders





05/15/21 08:18


Knee 3V Lt [CR] Stat 











Plan: 





patient verbalized understanding of DC, all questions were answered prior to DC.

  Knee brace applied and walker instructions give.  Patient tolerated well.

## 2021-05-17 NOTE — CR
DATE OF SERVICE: 05/15/21

CLINICAL DATA:  Fall, pain.  



LEFT KNEE:

There are mild osteoarthritic changes with mild narrowing of the medial 
compartment joint space. There is a small joint effusion.



No acute abnormalities. No focal lytic or blastic bone lesions.



[id number]

[facility]

## 2021-05-26 ENCOUNTER — HOSPITAL ENCOUNTER (EMERGENCY)
Dept: HOSPITAL 60 - LB.ED | Age: 56
Discharge: HOME | End: 2021-05-26
Payer: MEDICARE

## 2021-05-26 VITALS — HEART RATE: 92 BPM | DIASTOLIC BLOOD PRESSURE: 76 MMHG | SYSTOLIC BLOOD PRESSURE: 129 MMHG

## 2021-05-26 DIAGNOSIS — Z88.8: ICD-10-CM

## 2021-05-26 DIAGNOSIS — F17.200: ICD-10-CM

## 2021-05-26 DIAGNOSIS — J44.1: Primary | ICD-10-CM

## 2021-05-26 DIAGNOSIS — I10: ICD-10-CM

## 2021-05-26 DIAGNOSIS — Z87.891: ICD-10-CM

## 2021-05-26 DIAGNOSIS — K21.9: ICD-10-CM

## 2021-05-26 DIAGNOSIS — Z91.018: ICD-10-CM

## 2021-05-26 DIAGNOSIS — Z79.82: ICD-10-CM

## 2021-05-26 DIAGNOSIS — E11.9: ICD-10-CM

## 2021-05-26 DIAGNOSIS — E66.9: ICD-10-CM

## 2021-05-26 DIAGNOSIS — Z88.4: ICD-10-CM

## 2021-05-26 RX ADMIN — ALBUTEROL SULFATE ONE MG: 2.5 SOLUTION RESPIRATORY (INHALATION) at 21:55

## 2021-05-26 RX ADMIN — ALBUTEROL SULFATE ONE MG: 2.5 SOLUTION RESPIRATORY (INHALATION) at 21:00

## 2021-05-26 NOTE — EDM.PDOC
ED HPI GENERAL MEDICAL PROBLEM





- General


Chief Complaint: Respiratory Problem


Stated Complaint: SOB


Time Seen by Provider: 21 21:15


Source of Information: Reports: Patient


History Limitations: Reports: No Limitations





- History of Present Illness


INITIAL COMMENTS - FREE TEXT/NARRATIVE: 





patient presented to the ER with a c/o SOB. h/o COPD and active smoking.





No fever or chills. 








Reports that SOB occurred earlier today - tried a couple nebs - helped a little 

but not much..





Finished a 5 weeks course of prednisone just 4 days ago.








No CP.





got her 2 doses of COVID vaccine. 








Still smoke few cigarettes a day. 


Treatments PTA: Reports: Breathing Treatments





- Related Data


                                    Allergies











Allergy/AdvReac Type Severity Reaction Status Date / Time


 


bisoprolol Allergy Severe Shortness Verified 20 13:18





   of Breath  


 


adhesive tape Allergy  Rash Verified 20 13:18


 


celecoxib [From Celebrex] Allergy  Nausea and Verified 20 18:57





   Vomiting  


 


pramipexole Allergy  Diaphoresis Verified 20 13:18


 


ketamine AdvReac Intermediate Agitation Verified 20 18:57











Home Meds: 


                                    Home Meds





Omeprazole 20 mg PO BIDAC 13 [History]


QUEtiapine [SEROquel] 100 mg PO QAM 11/21/15 [History]


QUEtiapine Fumarate [Quetiapine Fumarate] 400 mg PO QPM 12/10/17 [History]


Calcium Carbonate/Vitamin D3 [Calcium 600Mg-D3 400 Unit Sfgl] 1 tab PO BID 

18 [History]


Ubidecarenone [Co Q-10] 200 mg PO BEDTIME 19 [History]


Mirtazapine [Remeron] 1 - 2 tab PO BEDTIME 19 [History]


Albuterol Sulfate [Proair Digihaler] 2 inh INH Q4HR PRN 20 [History]


Budesonide/Formoterol [Symbicort 160-4.5 MCG] 2 inh INH BID 20 [History]


Topiramate 25 mg PO BID 20 [History]


Varenicline [Chantix] 1 mg PO DAILY 20 [History]


ondansetron HCL [Zofran] 4 mg PO Q4HR PRN 20 [History]


risperiDONE [Risperdal] 5 mg PO BEDTIME 20 [History]


Aspirin [Halfprin] 81 mg PO DAILY 20 [History]


Losartan [Cozaar] 12.5 mg PO DAILY 20 [History]


Montelukast [Singulair] 10 mg PO BEDTIME 20 [History]


SUMAtriptan [Imitrex] 25 mg PO ASDIRECTED PRN 20 [History]


sitaGLIPtin Phos/Metformin HCl [Janumet 50-1,000 MG] 1 tab PO BID 20 

[History]


Albuterol/Ipratropium [DuoNeb 3.0-0.5 MG/3 ML] 3 ml NEB Q4H  neb 20 [Rx]


Furosemide 40 mg PO DAILY #60 20 [Rx]


Acetaminophen [Tylenol Arthritis] 1 - 2 tab PO Q8H PRN 20 [History]


Baclofen 10 mg PO BID 20 [History]


Dicyclomine [Bentyl] 10 mg PO TID PRN 20 [History]


Docusate Sodium [Dulcolax Stool Softener] 100 mg PO BID PRN 20 [History]


Ipratropium/Albuterol Sulfate [Iprat-Albut 0.5-3(2.5) MG/3 ML] 3 ml IH Q4H PRN 

20 [History]


Ketorolac [Toradol] 10 mg PO TID PRN 20 [History]


Meloxicam [Mobic] 7.5 mg PO ASDIRECTED PRN 20 [History]


QUEtiapine [SEROquel] 50 mg PO TID PRN 20 [History]


Spironolactone [Aldactone] 2 tab PO BID 20 [History]


atorvaSTATin [Lipitor] 20 mg PO BEDTIME 20 [History]


rOPINIRole [Requip] 0.5 mg PO BEDTIME 20 [History]


Albuterol [Proventil Neb Soln] 2.5 mg NEB Q4H #60 neb 20 [Rx]


Azithromycin [Zithromax] 500 mg PO Q24H #4 tablet 20 [Rx]


Nicotine [Habitrol] 21 mg TRDERM DAILY #14 patch 20 [Rx]


QUEtiapine [SEROquel] 50 mg PO TID PRN  tablet 20 [Rx]


QUEtiapine [SEROquel] 100 mg PO QAM  tablet 20 [Rx]


SitaGLIPtin [Januvia] 50 mg PO BIDMEALS  tablet 20 [Rx]


Spironolactone [Aldactone] 50 mg PO BID@0800,1200  tablet 20 [Rx]


amLODIPine [Norvasc] 2.5 mg PO DAILY #30 tablet 20 [Rx]


predniSONE [Prednisone] 5 mg PO DAILY #3 tablet 20 [Rx]


predniSONE [Prednisone] 10 mg PO DAILY #3 tablet 20 [Rx]


predniSONE [Prednisone] 20 mg PO DAILY #3 tablet 20 [Rx]


rOPINIRole [Requip] 0.5 mg PO BEDTIME  tablet 20 [Rx]


risperiDONE [RisperiDAL] 5 mg PO BEDTIME  tablet 20 [Rx]











Past Medical History





- Past Health History


Medical/Surgical History: Denies Medical/Surgical History


HEENT History: Reports: Impaired Vision, Other (See Below)


Other HEENT History: glasses for reading,


Cardiovascular History: Reports: Heart Murmur, Hypertension


Other Cardiovascular History: states recently has been told juan jose has a valve 

problem but not bad enough for surgery


Respiratory History: Reports: Asthma, Bronchitis, Recurrent, COPD, Pneumonia, 

Recurrent, Other (See Below)


Other Respiratory History: Emphysema


Gastrointestinal History: Reports: Bowel Obstruction, GERD, Irritable Bowel 

Syndrome


Genitourinary History: Reports: None


OB/GYN History: Reports: Dysfunctional Uterine Bleeding, Pregnancy


Musculoskeletal History: Reports: Back Pain, Chronic


Other Musculoskeletal History: back / butt pain, shoots down left leg


Neurological History: Reports: Migraines, Neuropathy, Peripheral


Psychiatric History: Reports: Anxiety, Depression, Suicide Attempt, Suicidal 

Ideation


Other Psychiatric History: suicide attemp years ago, states got help and no long

 thinks of it


Endocrine/Metabolic History: Reports: Diabetes, Type II, Obesity/BMI 30+


Hematologic History: Reports: Blood Transfusion(s), Iron Deficiency


Oncologic (Cancer) History: Reports: None


Dermatologic History: Reports: Eczema, Other (See Below)


Other Dermatologic History: Allergy to paper tape





- Infectious Disease History


Infectious Disease History: Reports: Chicken Pox, Measles, Rubella





- Past Surgical History


HEENT Surgical History: Reports: Eye Surgery


Other HEENT Surgeries/Procedures: Eye Surgery, when 7 yr and grade school


Cardiovascular Surgical History: Reports: None


Respiratory Surgical History: Reports: None


GI Surgical History: Reports: None


Other GI Surgeries/Procedures: ABD. SURGERY AND SCAR TISSUE REMOVAL


Female  Surgical History: Reports:  Section, Hysterectomy, Other (See 

Below)


Other Female  Surgeries/Procedures: appendix


Endocrine Surgical History: Reports: None


Neurological Surgical History: Reports: None


Musculoskeletal Surgical History: Reports: None


Dermatological Surgical History: Reports: None





Social & Family History





- Family History


Family Medical History: No Pertinent Family History


Cardiac: Reports: None


Musculoskeletal: Reports: Arthritis, Back pain, Chronic


Neurological: Reports: None


Psychiatric: Reports: Depression


Endocrine/Metabolic: Reports: None


Oncologic: Reports: None





- Tobacco Use


Tobacco Use Status *Q: Former Tobacco User


Used Tobacco, but Quit: Yes


Month/Year Tobacco Last Used: 21





- Caffeine Use


Caffeine Use: Reports: Coffee


Other Caffeine Use: 2 cups a day


Caffeine Use Comment: 2 cups of coffee per day 1 soda per day





ED ROS GENERAL





- Review of Systems


Review Of Systems: See Below


Constitutional: Reports: No Symptoms


HEENT: Reports: No Symptoms


Respiratory: Reports: Shortness of Breath, Wheezing


Cardiovascular: Reports: No Symptoms


GI/Abdominal: Reports: No Symptoms


Musculoskeletal: Reports: No Symptoms


Skin: Reports: No Symptoms


Neurological: Reports: No Symptoms





ED EXAM, GENERAL





- Physical Exam


Exam: See Below


Exam Limited By: No Limitations


General Appearance: Alert, WD/WN


Eye Exam: Bilateral Eye: EOMI


Respiratory/Chest: No Accessory Muscle Use, Chest Non-Tender, Respiratory 

Distress, Wheezing


Cardiovascular: Normal Peripheral Pulses, Regular Rate, Rhythm


GI/Abdominal: Normal Bowel Sounds, Soft


Extremities: Normal Inspection


Neurological: Alert, Oriented


Psychiatric: Normal Affect, Normal Mood





Course





- Vital Signs


Last Recorded V/S: 





                                Last Vital Signs











Temp  36.6 C   21 20:55


 


Pulse  92   21 20:55


 


Resp  28 H  21 20:55


 


BP  129/76   21 20:55


 


Pulse Ox  97   21 20:55














- Orders/Labs/Meds


Orders: 





                               Active Orders 24 hr











 Category Date Time Status


 


 RT Aerosol Therapy [RC] ASDIRECTED Care  21 21:44 Ordered











Meds: 





Medications














Discontinued Medications














Generic Name Dose Route Start Last Admin





  Trade Name Maite  PRN Reason Stop Dose Admin


 


Albuterol  2.5 mg  21 21:44 





  Albuterol 0.083% 2.5 Mg/3 Ml Neb Soln  NEB  21 21:45 





  ONETIME ONE  


 


Albuterol/Ipratropium  Confirm  21 21:47 





  Albuterol/Ipratropium 3.0-0.5 Mg/3 Ml Neb Soln  Administered  21 21:48 





  Dose  





  3 ml  





  .ROUTE  





  .STK-MED ONE  














- Re-Assessments/Exams


Free Text/Narrative Re-Assessment/Exam: 


sating well on RA - 96%





RR 24





was given a couple nebs treatment - reports significant improvement in symptoms








Also reports anxiety at home and some panic attacks with her nebs - is 

requesting something for that.








was given Xanax 0.5mg po to help with her symptoms 





Departure





- Departure


Time of Disposition: 21:50


Disposition: Home, Self-Care 01


Condition: Good


Clinical Impression: 


 COPD with acute exacerbation, Smoking








- Discharge Information


*PRESCRIPTION DRUG MONITORING PROGRAM REVIEWED*: Not Applicable


*COPY OF PRESCRIPTION DRUG MONITORING REPORT IN PATIENT JAE: Not Applicable


Instructions:  Coping with Quitting Smoking, Chronic Obstructive Pulmonary 

Disease Exacerbation, Easy-to-Read


Referrals: 


PCP,None [Primary Care Provider] - 





Sepsis Event Note (ED)





- Evaluation


Sepsis Screening Result: No Definite Risk





- Focused Exam


Vital Signs: 





                                   Vital Signs











  Temp Pulse Resp BP Pulse Ox


 


 21 20:55  36.6 C  92  28 H  129/76  97














- Problem List & Annotations


(1) COPD with acute exacerbation


SNOMED Code(s): 858666620


   Code(s): J44.1 - CHRONIC OBSTRUCTIVE PULMONARY DISEASE W (ACUTE) EXACERBATION

  Status: Acute   Current Visit: Yes   





(2) Smoking


SNOMED Code(s): 01321936


   Code(s): F17.200 - NICOTINE DEPENDENCE, UNSPECIFIED, UNCOMPLICATED   Status: 

Acute   Current Visit: Yes   





- Problem List Review


Problem List Initiated/Reviewed/Updated: Yes





- My Orders


Last 24 Hours: 





My Active Orders





21 21:44


RT Aerosol Therapy [RC] ASDIRECTED 














- Assessment/Plan


Last 24 Hours: 





My Active Orders





21 21:44


RT Aerosol Therapy [RC] ASDIRECTED 











Plan: 





- will start on prednisone 





- recommend to quit smoking 





- start taking Xanax for anxiety 





- follow up with your PCP within 3-10 days 








- return to the ER if symptoms got worse or any concerns

## 2021-08-07 ENCOUNTER — HOSPITAL ENCOUNTER (EMERGENCY)
Dept: HOSPITAL 60 - LB.ED | Age: 56
Discharge: HOME | End: 2021-08-07
Payer: MEDICARE

## 2021-08-07 VITALS — SYSTOLIC BLOOD PRESSURE: 152 MMHG | HEART RATE: 100 BPM | DIASTOLIC BLOOD PRESSURE: 81 MMHG

## 2021-08-07 DIAGNOSIS — F43.9: ICD-10-CM

## 2021-08-07 DIAGNOSIS — Z91.048: ICD-10-CM

## 2021-08-07 DIAGNOSIS — Z79.899: ICD-10-CM

## 2021-08-07 DIAGNOSIS — Z79.82: ICD-10-CM

## 2021-08-07 DIAGNOSIS — Z72.0: ICD-10-CM

## 2021-08-07 DIAGNOSIS — I10: ICD-10-CM

## 2021-08-07 DIAGNOSIS — G43.909: ICD-10-CM

## 2021-08-07 DIAGNOSIS — E11.42: ICD-10-CM

## 2021-08-07 DIAGNOSIS — Z88.4: ICD-10-CM

## 2021-08-07 DIAGNOSIS — Z88.8: ICD-10-CM

## 2021-08-07 DIAGNOSIS — Z88.1: ICD-10-CM

## 2021-08-07 DIAGNOSIS — F41.0: Primary | ICD-10-CM

## 2021-08-07 DIAGNOSIS — E66.9: ICD-10-CM

## 2021-08-07 DIAGNOSIS — J44.9: ICD-10-CM

## 2021-08-07 PROCEDURE — 93005 ELECTROCARDIOGRAM TRACING: CPT

## 2021-08-07 PROCEDURE — 99283 EMERGENCY DEPT VISIT LOW MDM: CPT
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
## 2021-09-21 ENCOUNTER — HOSPITAL ENCOUNTER (EMERGENCY)
Dept: HOSPITAL 60 - LB.ED | Age: 56
Discharge: HOME | End: 2021-09-21
Payer: MEDICARE

## 2021-09-21 VITALS — SYSTOLIC BLOOD PRESSURE: 143 MMHG | HEART RATE: 97 BPM | DIASTOLIC BLOOD PRESSURE: 71 MMHG

## 2021-09-21 DIAGNOSIS — K21.9: ICD-10-CM

## 2021-09-21 DIAGNOSIS — Z88.6: ICD-10-CM

## 2021-09-21 DIAGNOSIS — I10: ICD-10-CM

## 2021-09-21 DIAGNOSIS — Z88.4: ICD-10-CM

## 2021-09-21 DIAGNOSIS — E11.9: ICD-10-CM

## 2021-09-21 DIAGNOSIS — Z79.82: ICD-10-CM

## 2021-09-21 DIAGNOSIS — E87.6: ICD-10-CM

## 2021-09-21 DIAGNOSIS — Z88.5: ICD-10-CM

## 2021-09-21 DIAGNOSIS — J44.9: ICD-10-CM

## 2021-09-21 DIAGNOSIS — K58.9: Primary | ICD-10-CM

## 2021-09-21 DIAGNOSIS — E66.9: ICD-10-CM

## 2021-09-21 DIAGNOSIS — Z79.84: ICD-10-CM

## 2021-09-21 DIAGNOSIS — Z88.8: ICD-10-CM

## 2021-09-21 DIAGNOSIS — Z79.899: ICD-10-CM

## 2021-09-21 PROCEDURE — 36415 COLL VENOUS BLD VENIPUNCTURE: CPT

## 2021-09-21 PROCEDURE — 96372 THER/PROPH/DIAG INJ SC/IM: CPT

## 2021-09-21 PROCEDURE — 85025 COMPLETE CBC W/AUTO DIFF WBC: CPT

## 2021-09-21 PROCEDURE — 74176 CT ABD & PELVIS W/O CONTRAST: CPT

## 2021-09-21 PROCEDURE — 81001 URINALYSIS AUTO W/SCOPE: CPT

## 2021-09-21 PROCEDURE — 80053 COMPREHEN METABOLIC PANEL: CPT

## 2021-09-21 PROCEDURE — 99284 EMERGENCY DEPT VISIT MOD MDM: CPT

## 2021-09-21 NOTE — CT
DATE OF SERVICE: 09/21/2021



DATE OF SERVICE: PAIN



Unenhanced abdomen and pelvic CT:



Multi slice axial acquisition without IV or oral contrast was performed.



Comparison is made to a prior exam dated 12 November 2020.



There is a stable pleural based nodule in the left lower lobe posterolaterally. 
There is a stable subpleural nodule in the right lower lobe.



Heart size is normal.



There is diffuse fatty infiltration of the liver. No focal hepatic lesions. The 
patient is status post cholecystectomy.



The spleen appears normal. The pancreas is mildly atrophic, otherwise 
unremarkable.



The right and left adrenals appear normal.



The right and left kidneys appear normal. No nephrocalcinosis or 
nephrolithiasis. No hydronephrosis or hydroureter.



There is a small amount of fluid within the bladder. It appears grossly normal.



The patient is status post hysterectomy.



There is submucosal fatty infiltration of the distal ileum. Chronic inflammatory
bowel disease should be considered.



The appendix is not visualized. No definite evidence appendicitis.



No free air. No free fluid. No dilated loops of bowel. No adenopathy. No aortic 
aneurysm.



No other significant findings.

MTDD

## 2021-09-21 NOTE — EDM.PDOC
ED HPI GENERAL MEDICAL PROBLEM





- General


Chief Complaint: Gastrointestinal Problem


Stated Complaint: ABDOMINAL PAIN


Time Seen by Provider: 21 14:45





- History of Present Illness


INITIAL COMMENTS - FREE TEXT/NARRATIVE: 





Pt C/O Abd pain for 1 day. It doesn't hurt at rest, but with activity or 

coughing it hurts alot.


She denies any recent falls or injuries.


She has been nauseated but has not vomited.


She feels her symptoms are getting worse.





- Related Data


                                    Allergies











Allergy/AdvReac Type Severity Reaction Status Date / Time


 


bisoprolol Allergy Severe Shortness Verified 20 13:18





   of Breath  


 


adhesive tape Allergy  Rash Verified 20 13:18


 


celecoxib [From Celebrex] Allergy  Nausea and Verified 20 18:57





   Vomiting  


 


pramipexole Allergy  Diaphoresis Verified 20 13:18


 


ketamine AdvReac Intermediate Agitation Verified 20 18:57











Home Meds: 


                                    Home Meds





Omeprazole 20 mg PO BIDAC 13 [History]


QUEtiapine [SEROquel] 100 mg PO QAM 11/21/15 [History]


QUEtiapine Fumarate [Quetiapine Fumarate] 400 mg PO QPM 12/10/17 [History]


Calcium Carbonate/Vitamin D3 [Calcium 600Mg-D3 400 Unit Sfgl] 1 tab PO BID 

18 [History]


Ubidecarenone [Co Q-10] 200 mg PO BEDTIME 19 [History]


Mirtazapine [Remeron] 1 - 2 tab PO BEDTIME 19 [History]


Albuterol Sulfate [Proair Digihaler] 2 inh INH Q4HR PRN 20 [History]


Budesonide/Formoterol [Symbicort 160-4.5 MCG] 2 inh INH BID 20 [History]


Topiramate 25 mg PO BID 20 [History]


Varenicline [Chantix] 1 mg PO DAILY 20 [History]


ondansetron HCL [Zofran] 4 mg PO Q4HR PRN 20 [History]


risperiDONE [Risperdal] 5 mg PO BEDTIME 20 [History]


Aspirin [Halfprin] 81 mg PO DAILY 20 [History]


Losartan [Cozaar] 12.5 mg PO DAILY 20 [History]


Montelukast [Singulair] 10 mg PO BEDTIME 20 [History]


SUMAtriptan [Imitrex] 25 mg PO ASDIRECTED PRN 20 [History]


sitaGLIPtin Phos/Metformin HCl [Janumet 50-1,000 MG] 1 tab PO BID 20 

[History]


Albuterol/Ipratropium [DuoNeb 3.0-0.5 MG/3 ML] 3 ml NEB Q4H  neb 20 [Rx]


Furosemide 40 mg PO DAILY #60 20 [Rx]


Acetaminophen [Tylenol Arthritis] 1 - 2 tab PO Q8H PRN 20 [History]


Baclofen 10 mg PO BID 20 [History]


Dicyclomine [Bentyl] 10 mg PO TID PRN 20 [History]


Docusate Sodium [Dulcolax Stool Softener] 100 mg PO BID PRN 20 [History]


Ipratropium/Albuterol Sulfate [Iprat-Albut 0.5-3(2.5) MG/3 ML] 3 ml IH Q4H PRN 

20 [History]


Ketorolac [Toradol] 10 mg PO TID PRN 20 [History]


Meloxicam [Mobic] 7.5 mg PO ASDIRECTED PRN 20 [History]


QUEtiapine [SEROquel] 50 mg PO TID PRN 20 [History]


Spironolactone [Aldactone] 2 tab PO BID 20 [History]


atorvaSTATin [Lipitor] 20 mg PO BEDTIME 20 [History]


rOPINIRole [Requip] 0.5 mg PO BEDTIME 20 [History]


Albuterol [Proventil Neb Soln] 2.5 mg NEB Q4H #60 neb 20 [Rx]


Azithromycin [Zithromax] 500 mg PO Q24H #4 tablet 20 [Rx]


Nicotine [Habitrol] 21 mg TRDERM DAILY #14 patch 20 [Rx]


QUEtiapine [SEROquel] 50 mg PO TID PRN  tablet 20 [Rx]


QUEtiapine [SEROquel] 100 mg PO QAM  tablet 20 [Rx]


SitaGLIPtin [Januvia] 50 mg PO BIDMEALS  tablet 20 [Rx]


Spironolactone [Aldactone] 50 mg PO BID@0800,1200  tablet 20 [Rx]


amLODIPine [Norvasc] 2.5 mg PO DAILY #30 tablet 20 [Rx]


predniSONE [Prednisone] 5 mg PO DAILY #3 tablet 20 [Rx]


predniSONE [Prednisone] 10 mg PO DAILY #3 tablet 20 [Rx]


predniSONE [Prednisone] 20 mg PO DAILY #3 tablet 20 [Rx]


rOPINIRole [Requip] 0.5 mg PO BEDTIME  tablet 20 [Rx]


risperiDONE [RisperiDAL] 5 mg PO BEDTIME  tablet 20 [Rx]


ALPRAZolam [Xanax] 0.25 mg PO DAILY #10 tablet 21 [Rx]


methylPREDNISolone [Medrol Dose Pack] 84 mg PO DAILY #21 dospk 21 [Rx]











Past Medical History





- Past Health History


Medical/Surgical History: Denies Medical/Surgical History


HEENT History: Reports: Impaired Vision, Other (See Below)


Other HEENT History: glasses for reading,


Cardiovascular History: Reports: Heart Murmur, Hypertension


Other Cardiovascular History: Pt has appointment with cardiologist in [place] in 5 days.  Pt states she may need a valve replaced


Respiratory History: Reports: Asthma, Bronchitis, Recurrent, COPD, Pneumonia, 

Recurrent, Other (See Below)


Other Respiratory History: Emphysema


Gastrointestinal History: Reports: Bowel Obstruction, GERD, Irritable Bowel 

Syndrome


Genitourinary History: Reports: None


OB/GYN History: Reports: Dysfunctional Uterine Bleeding, Pregnancy


Musculoskeletal History: Reports: Back Pain, Chronic


Other Musculoskeletal History: back / butt pain, shoots down left leg


Neurological History: Reports: Migraines, Neuropathy, Peripheral


Psychiatric History: Reports: Anxiety, Depression, Suicide Attempt, Suicidal 

Ideation


Other Psychiatric History: suicide attemp years ago, states got help and no long

thinks of it


Endocrine/Metabolic History: Reports: Diabetes, Type II, Obesity/BMI 30+


Hematologic History: Reports: Blood Transfusion(s), Iron Deficiency


Oncologic (Cancer) History: Reports: None


Dermatologic History: Reports: Eczema, Other (See Below)


Other Dermatologic History: Allergy to paper tape





- Infectious Disease History


Infectious Disease History: Reports: Chicken Pox, Measles, Rubella





- Past Surgical History


HEENT Surgical History: Reports: Eye Surgery


Other HEENT Surgeries/Procedures: Eye Surgery, when 7 yr and grade school


Cardiovascular Surgical History: Reports: None


Respiratory Surgical History: Reports: None


GI Surgical History: Reports: None


Other GI Surgeries/Procedures: ABD. SURGERY AND SCAR TISSUE REMOVAL


Female  Surgical History: Reports:  Section, Hysterectomy, Other (See 

Below)


Other Female  Surgeries/Procedures: appendix


Endocrine Surgical History: Reports: None


Neurological Surgical History: Reports: None


Musculoskeletal Surgical History: Reports: None


Dermatological Surgical History: Reports: None





Social & Family History





- Family History


Family Medical History: No Pertinent Family History


Cardiac: Reports: None


Musculoskeletal: Reports: Arthritis, Back pain, Chronic


Neurological: Reports: None


Psychiatric: Reports: Depression


Endocrine/Metabolic: Reports: None


Oncologic: Reports: None





- Caffeine Use


Caffeine Use: Reports: Coffee


Other Caffeine Use: 2 cups a day


Caffeine Use Comment: 2 cups of coffee per day 1 soda per day





ED ROS GENERAL





- Review of Systems


Review Of Systems: Comprehensive ROS is negative, except as noted in HPI.


GI/Abdominal: Reports: Abdominal Pain, Nausea





ED EXAM, GI/ABD





- Physical Exam


Exam: See Below


GI/Abdominal Exam: Other (Diffuse pain in the mid and upper Abd bilaterally with

light palpation. B.S. are present.)





Course





- Radiology Interpretation


Free Text/Narrative:: 





CT of the Abd shows possible chronic IBS.


Pt stated she has Hx of this.


I will give her Solu Medrol 125 mg IM.


And start her on prednisone 40 mg a day for 4 days, starting tomorrow.


Diet should be soft or bland.


Activity as tolerated.


Her K+ level is 3.1 - She is on Lasix.


I will start K-Dur 10 meq daily for 14 days.


Re check in the clinic over the next few days.





Departure





- Departure


Time of Disposition: 16:40


Disposition: Home, Self-Care 01


Condition: Good


Clinical Impression: 


 Hypokalemia





IBS (irritable bowel syndrome)


Qualifiers:


 Irritable bowel syndrome type: unspecified Qualified Code(s): K58.9 - Irritable

bowel syndrome without diarrhea








- Discharge Information


*PRESCRIPTION DRUG MONITORING PROGRAM REVIEWED*: Yes


*COPY OF PRESCRIPTION DRUG MONITORING REPORT IN PATIENT JAE: Yes


Referrals: 


PCP,None [Primary Care Provider] -

## 2021-09-27 ENCOUNTER — HOSPITAL ENCOUNTER (EMERGENCY)
Dept: HOSPITAL 60 - LB.ED | Age: 56
Discharge: HOME | End: 2021-09-27
Payer: MEDICARE

## 2021-09-27 DIAGNOSIS — E11.42: ICD-10-CM

## 2021-09-27 DIAGNOSIS — Z91.048: ICD-10-CM

## 2021-09-27 DIAGNOSIS — Z79.899: ICD-10-CM

## 2021-09-27 DIAGNOSIS — K58.9: Primary | ICD-10-CM

## 2021-09-27 DIAGNOSIS — Z88.8: ICD-10-CM

## 2021-09-27 DIAGNOSIS — Z88.4: ICD-10-CM

## 2021-09-27 DIAGNOSIS — Z79.82: ICD-10-CM

## 2021-09-27 DIAGNOSIS — K21.9: ICD-10-CM

## 2021-09-27 DIAGNOSIS — E66.9: ICD-10-CM

## 2021-09-27 DIAGNOSIS — I10: ICD-10-CM

## 2021-09-27 DIAGNOSIS — J44.9: ICD-10-CM

## 2021-09-27 RX ADMIN — METHYLPREDNISOLONE SODIUM SUCCINATE ONE: 125 INJECTION, POWDER, FOR SOLUTION INTRAMUSCULAR; INTRAVENOUS at 19:45

## 2021-09-27 RX ADMIN — KETOROLAC TROMETHAMINE ONE MG: 30 INJECTION, SOLUTION INTRAMUSCULAR at 19:10

## 2021-09-27 RX ADMIN — METHYLPREDNISOLONE SODIUM SUCCINATE ONE MG: 125 INJECTION, POWDER, FOR SOLUTION INTRAMUSCULAR; INTRAVENOUS at 19:45

## 2021-09-27 NOTE — EDM.PDOC
ED HPI GENERAL MEDICAL PROBLEM





- General


Chief Complaint: Abdominal Pain


Stated Complaint: irritable bowel


Time Seen by Provider: 21 18:50


Source of Information: Reports: Patient


History Limitations: Reports: No Limitations





- History of Present Illness


INITIAL COMMENTS - FREE TEXT/NARRATIVE: 





56-year-old female presents to the ED complaining of abdominal pain.  Patient 

was seen last week by ER staff for the same.  Patient believe that she had IBS. 

Was given prednisone and Solu-Medrol for same.  Patient relates that she has had

bloating she describes this pain as generalized abdominal pain that is sharp.  

Movement increases her pain, patient states that nothing really works to 

decrease it except steroids.  Upon further questioning patient relates that she 

is also had small amount of diarrhea and minor gas discomfort.  Patient 

describes the pain as a 7/10 on the pain scale.  This present episodes been 

going on for approximately 2 weeks





- Related Data


                                    Allergies











Allergy/AdvReac Type Severity Reaction Status Date / Time


 


bisoprolol Allergy Severe Shortness Verified 20 13:18





   of Breath  


 


adhesive tape Allergy  Rash Verified 20 13:18


 


celecoxib [From Celebrex] Allergy  Nausea and Verified 20 18:57





   Vomiting  


 


pramipexole Allergy  Diaphoresis Verified 20 13:18


 


ketamine AdvReac Intermediate Agitation Verified 20 18:57











Home Meds: 


                                    Home Meds





Omeprazole 20 mg PO BIDAC 13 [History]


QUEtiapine [SEROquel] 100 mg PO QAM 11/21/15 [History]


QUEtiapine Fumarate [Quetiapine Fumarate] 400 mg PO QPM 12/10/17 [History]


Calcium Carbonate/Vitamin D3 [Calcium 600Mg-D3 400 Unit Sfgl] 1 tab PO BID 

18 [History]


Ubidecarenone [Co Q-10] 200 mg PO BEDTIME 19 [History]


Mirtazapine [Remeron] 1 - 2 tab PO BEDTIME 19 [History]


Albuterol Sulfate [Proair Digihaler] 2 inh INH Q4HR PRN 20 [History]


Budesonide/Formoterol [Symbicort 160-4.5 MCG] 2 inh INH BID 20 [History]


Topiramate 25 mg PO BID 20 [History]


Varenicline [Chantix] 1 mg PO DAILY 20 [History]


ondansetron HCL [Zofran] 4 mg PO Q4HR PRN 20 [History]


risperiDONE [Risperdal] 5 mg PO BEDTIME 20 [History]


Aspirin [Halfprin] 81 mg PO DAILY 20 [History]


Losartan [Cozaar] 12.5 mg PO DAILY 20 [History]


Montelukast [Singulair] 10 mg PO BEDTIME 20 [History]


SUMAtriptan [Imitrex] 25 mg PO ASDIRECTED PRN 20 [History]


sitaGLIPtin Phos/Metformin HCl [Janumet 50-1,000 MG] 1 tab PO BID 20 

[History]


Albuterol/Ipratropium [DuoNeb 3.0-0.5 MG/3 ML] 3 ml NEB Q4H  neb 20 [Rx]


Furosemide 40 mg PO DAILY #60 20 [Rx]


Acetaminophen [Tylenol Arthritis] 1 - 2 tab PO Q8H PRN 20 [History]


Baclofen 10 mg PO BID 20 [History]


Dicyclomine [Bentyl] 10 mg PO TID PRN 20 [History]


Docusate Sodium [Dulcolax Stool Softener] 100 mg PO BID PRN 20 [History]


Ipratropium/Albuterol Sulfate [Iprat-Albut 0.5-3(2.5) MG/3 ML] 3 ml IH Q4H PRN 

20 [History]


Ketorolac [Toradol] 10 mg PO TID PRN 20 [History]


Meloxicam [Mobic] 7.5 mg PO ASDIRECTED PRN 20 [History]


QUEtiapine [SEROquel] 50 mg PO TID PRN 20 [History]


Spironolactone [Aldactone] 2 tab PO BID 20 [History]


atorvaSTATin [Lipitor] 20 mg PO BEDTIME 20 [History]


rOPINIRole [Requip] 0.5 mg PO BEDTIME 20 [History]


Albuterol [Proventil Neb Soln] 2.5 mg NEB Q4H #60 neb 20 [Rx]


Azithromycin [Zithromax] 500 mg PO Q24H #4 tablet 20 [Rx]


Nicotine [Habitrol] 21 mg TRDERM DAILY #14 patch 20 [Rx]


QUEtiapine [SEROquel] 50 mg PO TID PRN  tablet 20 [Rx]


QUEtiapine [SEROquel] 100 mg PO QAM  tablet 20 [Rx]


SitaGLIPtin [Januvia] 50 mg PO BIDMEALS  tablet 20 [Rx]


Spironolactone [Aldactone] 50 mg PO BID@0800,1200  tablet 20 [Rx]


amLODIPine [Norvasc] 2.5 mg PO DAILY #30 tablet 20 [Rx]


predniSONE [Prednisone] 5 mg PO DAILY #3 tablet 20 [Rx]


predniSONE [Prednisone] 10 mg PO DAILY #3 tablet 20 [Rx]


predniSONE [Prednisone] 20 mg PO DAILY #3 tablet 20 [Rx]


rOPINIRole [Requip] 0.5 mg PO BEDTIME  tablet 20 [Rx]


risperiDONE [RisperiDAL] 5 mg PO BEDTIME  tablet 20 [Rx]


ALPRAZolam [Xanax] 0.25 mg PO DAILY #10 tablet 21 [Rx]


methylPREDNISolone [Medrol Dose Pack] 84 mg PO DAILY #21 dospk 21 [Rx]


Potassium Chloride [K-Tab] 10 meq PO DAILY #30 tablet.er 21 [Rx]











Past Medical History





- Past Health History


Medical/Surgical History: Denies Medical/Surgical History


HEENT History: Reports: Impaired Vision, Other (See Below)


Other HEENT History: glasses for reading,


Cardiovascular History: Reports: Heart Murmur, Hypertension


Other Cardiovascular History: Pt has appointment with cardiologist in [place] in 5 days.  Pt states she may need a valve replaced


Respiratory History: Reports: Asthma, Bronchitis, Recurrent, COPD, Pneumonia, 

Recurrent, Other (See Below)


Other Respiratory History: Emphysema


Gastrointestinal History: Reports: Bowel Obstruction, GERD, Irritable Bowel 

Syndrome


Genitourinary History: Reports: None


OB/GYN History: Reports: Dysfunctional Uterine Bleeding, Pregnancy


Musculoskeletal History: Reports: Back Pain, Chronic


Other Musculoskeletal History: back / butt pain, shoots down left leg


Neurological History: Reports: Migraines, Neuropathy, Peripheral


Psychiatric History: Reports: Anxiety, Depression, Suicide Attempt, Suicidal 

Ideation


Other Psychiatric History: suicide attemp years ago, states got help and no long

 thinks of it


Endocrine/Metabolic History: Reports: Diabetes, Type II, Obesity/BMI 30+


Hematologic History: Reports: Blood Transfusion(s), Iron Deficiency


Oncologic (Cancer) History: Reports: None


Dermatologic History: Reports: Eczema, Other (See Below)


Other Dermatologic History: Allergy to paper tape





- Infectious Disease History


Infectious Disease History: Reports: Chicken Pox, Measles, Rubella





- Past Surgical History


HEENT Surgical History: Reports: Eye Surgery


Other HEENT Surgeries/Procedures: Eye Surgery, when 7 yr and grade school


Cardiovascular Surgical History: Reports: None


Respiratory Surgical History: Reports: None


GI Surgical History: Reports: None


Other GI Surgeries/Procedures: ABD. SURGERY AND SCAR TISSUE REMOVAL


Female  Surgical History: Reports:  Section, Hysterectomy, Other (See 

Below)


Other Female  Surgeries/Procedures: appendix


Endocrine Surgical History: Reports: None


Neurological Surgical History: Reports: None


Musculoskeletal Surgical History: Reports: None


Dermatological Surgical History: Reports: None





Social & Family History





- Family History


Family Medical History: No Pertinent Family History


Cardiac: Reports: None


Musculoskeletal: Reports: Arthritis, Back pain, Chronic


Neurological: Reports: None


Psychiatric: Reports: Depression


Endocrine/Metabolic: Reports: None


Oncologic: Reports: None





- Caffeine Use


Caffeine Use: Reports: Soda


Other Caffeine Use: 2 cups a day


Caffeine Use Comment: 2 cups of coffee per day 1 soda per day





ED ROS GENERAL





- Review of Systems


Review Of Systems: See Below


Constitutional: Denies: Fever, Chills, Malaise, Fatigue, Night Sweats


HEENT: Reports: No Symptoms


Respiratory: Reports: Other (COPDshortness of breath)


Cardiovascular: Reports: No Symptoms


Endocrine: Reports: No Symptoms


GI/Abdominal: Reports: Abdominal Pain, Diarrhea, Distension, Flatus.  Denies: 

Anorexia, Black Stool, Bloody Stool, Constipation, Difficulty Swallowing, 

Hematemesis, Hematochezia, Melena, Mucous in Stool, Nausea, Stool Incontinence, 

Vomiting


Musculoskeletal: Reports: No Symptoms


Skin: Reports: No Symptoms


Neurological: Reports: No Symptoms


Psychiatric: Reports: No Symptoms


Hematologic/Lymphatic: Reports: No Symptoms





ED EXAM, GI/ABD





- Physical Exam


Exam: See Below


Text/Narrative:: 





56-year-old female presents the ED patient found lying semi-Fowlers in stretcher

 bay 2.  Patient appears to be in minor distress due to pain, patient is alert 

and oriented 3 of 3 GCS 4 5 6.  Patient speaking in full sentences.


Exam Limited By: No Limitations


General Appearance: Alert, WD/WN, Mild Distress


Eyes: Bilateral: EOMI


Ears: Hearing Grossly Normal


Nose: No: No Blood, Nasal Drainage, Clear Rhinorrhea, Nasal Flaring


Throat/Mouth: Normal Lips, Normal Voice, No Airway Compromise


Head: Atraumatic, Normocephalic


Respiratory/Chest: No Respiratory Distress, No Accessory Muscle Use, Chest Non-

Tender, Rhonchi (Minor rhonchi left lower lobe cleared with tussis)


Cardiovascular: Normal Peripheral Pulses, Regular Rate, Rhythm, No Edema, No 

Gallop, No JVD, No Murmur, No Rub


GI/Abdominal Exam: Other (Visual inspection shows large vertical scars midline 

and right of midline approximately 8 to 10 inches in length from previous 

surgeries, patient is tender in the upper right-hand quadrant Terrell signs 

negative, discomfort/tenderness 3 remaining quadrants no obvious masses, 

auscultation hyperactive )


Back Exam: Normal Inspection.  No: CVA Tenderness (R), CVA Tenderness (L)


Extremities: Normal Inspection, Normal Range of Motion, Non-Tender, Normal 

Capillary Refill, No Pedal Edema


Neurological: Alert, Oriented, Normal Cognition


Psychiatric: Normal Affect, Normal Mood


Skin Exam: Warm, Dry, Intact, Normal Color, No Rash





Course





- Orders/Labs/Meds


Labs: 


                                Laboratory Tests











  21 Range/Units





  19:25 19:25 


 


WBC  7.7   (4.0-11.0)  K/uL


 


RBC  4.85   (3.80-5.80)  M/uL


 


Hgb  13.0   (11.5-16.5)  g/dL


 


Hct  39.7   (37.0-47.0)  %


 


MCV  82   (76-96)  fL


 


MCH  26.8 L   (27.0-32.0)  pg


 


MCHC  32.7   (31.0-35.0)  g/dL


 


RDW  14.8   (11.0-16.0)  %


 


Plt Count  210   (150-500)  K/uL


 


MPV  12.2 H   (6.0-10.0)  fL


 


Neut % (Auto)  67.2   (45.0-70.0)  %


 


Lymph % (Auto)  24.2   (20.0-40.0)  %


 


Mono % (Auto)  7.5   (3.0-10.0)  %


 


Eos % (Auto)  0.6 L   (1.0-5.0)  %


 


Baso % (Auto)  0.5   (0.0-0.5)  %


 


Neut # (Auto)  5.19   (2.00-7.50)  K/uL


 


Lymph # (Auto)  1.87   (1.50-4.00)  K/uL


 


Mono # (Auto)  0.58   (0.20-0.80)  K/uL


 


Eos # (Auto)  0.05   (0.04-0.40)  K/uL


 


Baso # (Auto)  0.04   (0.02-0.10)  K/uL


 


Sodium   135 L  (136-145)  mmol/L


 


Potassium   3.3 L  (3.5-5.1)  mmol/L


 


Chloride   95 L  ()  mmol/L


 


Carbon Dioxide   28.5  D  (21.0-32.0)  mmol/L


 


Anion Gap   14.8  (5.0-15.0)  mmol/L


 


BUN   15  (8-26)  mg/dL


 


Creatinine   1.19 H  (0.55-1.02)  mg/dL


 


Est Cr Clr Drug Dosing   TNP  


 


Estimated GFR (MDRD)   47 L  (>60)  MLS/MIN


 


BUN/Creatinine Ratio   12.6  (6-25)  


 


Glucose   358 H D  ()  mg/dL


 


Calcium   9.1  (8.5-10.1)  mg/dL











Meds: 


Medications














Discontinued Medications














Generic Name Dose Route Start Last Admin





  Trade Name Freq  PRN Reason Stop Dose Admin


 


Ketorolac Tromethamine  Confirm  21 19:18 





  Ketorolac 30 Mg/Ml Sdv  Administered  21 19:19 





  Dose  





  30 mg  





  .ROUTE  





  .STK-MED ONE  


 


Methylprednisolone Sodium Succinate  125 mg  21 19:18 





  Methylprednisolone Sodium Succinate 125 Mg/2 Ml Sdv  IM  21 19:19 





  ONETIME ONE  


 


Methylprednisolone Sodium Succinate  Confirm  21 19:42 





  Methylprednisolone Sodium Succinate 125 Mg/2 Ml Sdv  Administered  21 

19:43 





  Dose  





  125 mg  





  .ROUTE  





  .STK-MED ONE  














Departure





- Departure


Time of Disposition: 20:10


Disposition: Home, Self-Care 01


Condition: Good


Clinical Impression: 


 Abdominal pain





Irritable bowel disease


Qualifiers:


 Irritable bowel syndrome type: unspecified Qualified Code(s): K58.9 - Irritable

 bowel syndrome without diarrhea








- Discharge Information


*PRESCRIPTION DRUG MONITORING PROGRAM REVIEWED*: No


*COPY OF PRESCRIPTION DRUG MONITORING REPORT IN PATIENT JAE: No


Prescriptions: 


Potassium Chloride [K-Tab] 10 meq PO DAILY #30 tablet.er


Instructions:  Low-FODMAP Eating Plan, Abdominal Pain, Adult, Easy-to-Read


Referrals: 


PCP,None [Primary Care Provider] - 


Forms:  ED Department Discharge





- Assessment/Plan


Assessment:: 





Assessment and plan cough: History, exam, labs to include CBC BMP, address 

patient's pain with ketorolac 30 mg IM, address patient's probable irritable 

bowel disease with Solu-Medrol 125 mg IM treatment plan going home to include 

prednisone 40 mg daily, patient follow-up with Dr. Portillo on Wednesday to address 

long-term care and treatment plan for irritable bowel disease.  All questions 

were answered to patient satisfaction patient agreed to treatment plan, patient 

was discharged in stable condition.





Patient remains hypokalemic despite 12 days of at home potassium patients 

potassium at last visit was 3.1 today's visit is 3.3 will address this issue 

with continuing potassium at home 10 mEq daily to be managed by Dr. Portillo.





Discontinue aspirin while on prednisone.

## 2021-09-28 VITALS — SYSTOLIC BLOOD PRESSURE: 121 MMHG | DIASTOLIC BLOOD PRESSURE: 82 MMHG | HEART RATE: 102 BPM

## 2021-12-08 ENCOUNTER — HOSPITAL ENCOUNTER (EMERGENCY)
Dept: HOSPITAL 60 - LB.ED | Age: 56
Discharge: HOME | End: 2021-12-08
Payer: MEDICARE

## 2021-12-08 DIAGNOSIS — Z79.899: ICD-10-CM

## 2021-12-08 DIAGNOSIS — J44.1: Primary | ICD-10-CM

## 2021-12-08 DIAGNOSIS — E11.42: ICD-10-CM

## 2021-12-08 DIAGNOSIS — Z91.048: ICD-10-CM

## 2021-12-08 DIAGNOSIS — Z88.4: ICD-10-CM

## 2021-12-08 DIAGNOSIS — E66.9: ICD-10-CM

## 2021-12-08 DIAGNOSIS — Z79.82: ICD-10-CM

## 2021-12-08 DIAGNOSIS — K21.9: ICD-10-CM

## 2021-12-08 DIAGNOSIS — Z88.8: ICD-10-CM

## 2021-12-08 NOTE — EDM.PDOC
ED HPI GENERAL MEDICAL PROBLEM





- General


Chief Complaint: Respiratory Problem


Stated Complaint: SOB


Time Seen by Provider: 21 22:30


Source of Information: Reports: Patient, RN Notes Reviewed


History Limitations: Reports: No Limitations





- History of Present Illness


INITIAL COMMENTS - FREE TEXT/NARRATIVE: 





This patient presents to the emergency department for evaluation of difficulty 

breathing.  She states she has had felt increased work of breathing for the last

couple of days.  She is currently receiving both an inhaler and a neb treatment 

at home but does not know what medications she is using.  She is also on 

prednisone but states this is for her irritable bowel syndrome not her 

breathing.  She denies chest pain, she denies fever.  Appetite is good, she 

denies vomiting or diarrhea.





- Related Data


                                    Allergies











Allergy/AdvReac Type Severity Reaction Status Date / Time


 


bisoprolol Allergy Severe Shortness Verified 20 13:18





   of Breath  


 


adhesive tape Allergy  Rash Verified 20 13:18


 


celecoxib [From Celebrex] Allergy  Nausea and Verified 20 18:57





   Vomiting  


 


pramipexole Allergy  Diaphoresis Verified 20 13:18


 


ketamine AdvReac Intermediate Agitation Verified 20 18:57











Home Meds: 


                                    Home Meds





Omeprazole 20 mg PO BIDAC 13 [History]


QUEtiapine [SEROquel] 100 mg PO QAM 11/21/15 [History]


QUEtiapine Fumarate [Quetiapine Fumarate] 400 mg PO QPM 12/10/17 [History]


Calcium Carbonate/Vitamin D3 [Calcium 600Mg-D3 400 Unit Sfgl] 1 tab PO BID 

18 [History]


Ubidecarenone [Co Q-10] 200 mg PO BEDTIME 19 [History]


Mirtazapine [Remeron] 1 - 2 tab PO BEDTIME 19 [History]


Albuterol Sulfate [Proair Digihaler] 2 inh INH Q4HR PRN 20 [History]


Budesonide/Formoterol [Symbicort 160-4.5 MCG] 2 inh INH BID 20 [History]


Topiramate 25 mg PO BID 20 [History]


Varenicline [Chantix] 1 mg PO DAILY 20 [History]


ondansetron HCL [Zofran] 4 mg PO Q4HR PRN 20 [History]


risperiDONE [Risperdal] 5 mg PO BEDTIME 20 [History]


Aspirin [Halfprin] 81 mg PO DAILY 20 [History]


Losartan [Cozaar] 12.5 mg PO DAILY 20 [History]


Montelukast [Singulair] 10 mg PO BEDTIME 20 [History]


SUMAtriptan [Imitrex] 25 mg PO ASDIRECTED PRN 20 [History]


sitaGLIPtin Phos/Metformin HCl [Janumet 50-1,000 MG] 1 tab PO BID 20 

[History]


Albuterol/Ipratropium [DuoNeb 3.0-0.5 MG/3 ML] 3 ml NEB Q4H  neb 20 [Rx]


Furosemide 40 mg PO DAILY #60 20 [Rx]


Acetaminophen [Tylenol Arthritis] 1 - 2 tab PO Q8H PRN 20 [History]


Baclofen 10 mg PO BID 20 [History]


Dicyclomine [Bentyl] 10 mg PO TID PRN 20 [History]


Docusate Sodium [Dulcolax Stool Softener] 100 mg PO BID PRN 20 [History]


Ipratropium/Albuterol Sulfate [Iprat-Albut 0.5-3(2.5) MG/3 ML] 3 ml IH Q4H PRN 

20 [History]


Ketorolac [Toradol] 10 mg PO TID PRN 20 [History]


Meloxicam [Mobic] 7.5 mg PO ASDIRECTED PRN 20 [History]


QUEtiapine [SEROquel] 50 mg PO TID PRN 20 [History]


Spironolactone [Aldactone] 2 tab PO BID 20 [History]


atorvaSTATin [Lipitor] 20 mg PO BEDTIME 20 [History]


rOPINIRole [Requip] 0.5 mg PO BEDTIME 20 [History]


Albuterol [Proventil Neb Soln] 2.5 mg NEB Q4H #60 neb 20 [Rx]


Azithromycin [Zithromax] 500 mg PO Q24H #4 tablet 20 [Rx]


Nicotine [Habitrol] 21 mg TRDERM DAILY #14 patch 20 [Rx]


QUEtiapine [SEROquel] 50 mg PO TID PRN  tablet 20 [Rx]


QUEtiapine [SEROquel] 100 mg PO QAM  tablet 20 [Rx]


SitaGLIPtin [Januvia] 50 mg PO BIDMEALS  tablet 20 [Rx]


Spironolactone [Aldactone] 50 mg PO BID@0800,1200  tablet 20 [Rx]


amLODIPine [Norvasc] 2.5 mg PO DAILY #30 tablet 20 [Rx]


predniSONE [Prednisone] 5 mg PO DAILY #3 tablet 20 [Rx]


predniSONE [Prednisone] 10 mg PO DAILY #3 tablet 20 [Rx]


predniSONE [Prednisone] 20 mg PO DAILY #3 tablet 20 [Rx]


rOPINIRole [Requip] 0.5 mg PO BEDTIME  tablet 20 [Rx]


risperiDONE [RisperiDAL] 5 mg PO BEDTIME  tablet 20 [Rx]


ALPRAZolam [Xanax] 0.25 mg PO DAILY #10 tablet 21 [Rx]


methylPREDNISolone [Medrol Dose Pack] 84 mg PO DAILY #21 dospk 21 [Rx]


Potassium Chloride [K-Tab] 10 meq PO DAILY #30 tablet.er 21 [Rx]











Past Medical History





- Past Health History


Medical/Surgical History: Denies Medical/Surgical History


HEENT History: Reports: Impaired Vision, Other (See Below)


Other HEENT History: glasses for reading,


Cardiovascular History: Reports: Heart Murmur, Hypertension


Other Cardiovascular History: Pt has appointment with cardiologist in [place] in 5 days.  Pt states she may need a valve replaced


Respiratory History: Reports: Asthma, Bronchitis, Recurrent, COPD, Pneumonia, 

Recurrent, Other (See Below)


Other Respiratory History: Emphysema


Gastrointestinal History: Reports: Bowel Obstruction, GERD, Irritable Bowel 

Syndrome


Genitourinary History: Reports: None


OB/GYN History: Reports: Dysfunctional Uterine Bleeding, Pregnancy


Musculoskeletal History: Reports: Back Pain, Chronic


Other Musculoskeletal History: back / butt pain, shoots down left leg


Neurological History: Reports: Migraines, Neuropathy, Peripheral


Psychiatric History: Reports: Anxiety, Depression, Suicide Attempt, Suicidal 

Ideation


Other Psychiatric History: suicide attemp years ago, states got help and no long

 thinks of it


Endocrine/Metabolic History: Reports: Diabetes, Type II, Obesity/BMI 30+


Hematologic History: Reports: Blood Transfusion(s), Iron Deficiency


Oncologic (Cancer) History: Reports: None


Dermatologic History: Reports: Eczema, Other (See Below)


Other Dermatologic History: Allergy to paper tape





- Infectious Disease History


Infectious Disease History: Reports: Chicken Pox, Measles, Rubella





- Past Surgical History


HEENT Surgical History: Reports: Eye Surgery


Other HEENT Surgeries/Procedures: Eye Surgery, when 7 yr and grade school


Cardiovascular Surgical History: Reports: None


Respiratory Surgical History: Reports: None


GI Surgical History: Reports: None


Other GI Surgeries/Procedures: ABD. SURGERY AND SCAR TISSUE REMOVAL


Female  Surgical History: Reports:  Section, Hysterectomy, Other (See 

Below)


Other Female  Surgeries/Procedures: appendix


Endocrine Surgical History: Reports: None


Neurological Surgical History: Reports: None


Musculoskeletal Surgical History: Reports: None


Dermatological Surgical History: Reports: None





Social & Family History





- Family History


Family Medical History: No Pertinent Family History


Cardiac: Reports: None


Musculoskeletal: Reports: Arthritis, Back pain, Chronic


Neurological: Reports: None


Psychiatric: Reports: Depression


Endocrine/Metabolic: Reports: None


Oncologic: Reports: None





- Caffeine Use


Caffeine Use: Reports: Soda


Other Caffeine Use: 2 cups a day


Caffeine Use Comment: 2 cups of coffee per day 1 soda per day





ED ROS GENERAL





- Review of Systems


Review Of Systems: Comprehensive ROS is negative, except as noted in HPI.





ED EXAM, GENERAL





- Physical Exam


Exam: See Below


Exam Limited By: No Limitations


General Appearance: Alert, No Apparent Distress, Obese (morbidly)


Eye Exam: Bilateral Eye: Normal Inspection, PERRL


Ears: Normal External Exam


Nose: Normal Inspection


Head: Atraumatic, Normocephalic


Neck: Normal Inspection, Supple, Full Range of Motion


Respiratory/Chest: No Respiratory Distress, Lungs Clear, Normal Breath Sounds, 

No Accessory Muscle Use, Other (Tachypnea).  No: Rales, Rhonchi, Wheezing


Cardiovascular: Regular Rate, Rhythm


Neurological: Alert, Oriented


Psychiatric: Normal Affect


Skin Exam: Warm, Dry, Intact





Course





- Orders/Labs/Meds


Orders: 





                               Active Orders 24 hr











 Category Date Time Status


 


 RT Aerosol Therapy [RC] ASDIRECTED Care  21 22:43 Active











Meds: 





Medications














Discontinued Medications














Generic Name Dose Route Start Last Admin





  Trade Name Maite  PRN Reason Stop Dose Admin


 


Albuterol/Ipratropium  3 ml  21 22:37  21 22:39





  Albuterol/Ipratropium 3.0-0.5 Mg/3 Ml Neb Soln  NEB  21 22:38  3 ml





  STAT STA   Administration














- Re-Assessments/Exams


Free Text/Narrative Re-Assessment/Exam: 


This patient with a history of COPD presents for evaluation of shortness of 

breath.  Signs and symptoms are consistent with COPD exacerbation.  A broad 

differential was considered including foreign body, viral induced reactive 

airway disease, pneumothorax, allergic phenomena, pneumonia, bronchitis etc. a 

chest x-ray was not ordered as the patient states she felt a proved nearly 

immediately upon arrival to the ER.  She was given 1 DuoNeb.  There are no signs

 at this point of any serious etiologies including those mentioned above.  There

 is no indication for hospitalization at this time given no hypoxia and only 

mild tachypnea.  She has had no increase in sputum production so no antibiotics 

are indicated.  She states she is getting 40 mg of prednisone daily for her 

irritable bowel syndrome.  That medication will be unchanged.  I advised close 

follow-up with her primary care provider and to return to the ED emergency 

department for increased wheezing, shortness of breath, or develops a fever 

greater than 102.  Patient was stable at the time of discharge.


21 22:57








Departure





- Departure


Time of Disposition: 23:55


Disposition: Home, Self-Care 01


Condition: Good


Clinical Impression: 


 COPD exacerbation





COPD (chronic obstructive pulmonary disease)


Qualifiers:


 COPD type: unspecified COPD Qualified Code(s): J44.9 - Chronic obstructive 

pulmonary disease, unspecified








- Discharge Information


*PRESCRIPTION DRUG MONITORING PROGRAM REVIEWED*: Not Applicable


*COPY OF PRESCRIPTION DRUG MONITORING REPORT IN PATIENT JAE: Not Applicable


Instructions:  Chronic Obstructive Pulmonary Disease, Easy-to-Read





- My Orders


Last 24 Hours: 





My Active Orders





21 22:43


RT Aerosol Therapy [RC] ASDIRECTED 














- Assessment/Plan


Last 24 Hours: 





My Active Orders





21 22:43


RT Aerosol Therapy [RC] ASDIRECTED

## 2021-12-28 NOTE — CR
DATE OF SERVICE:  01/05/2019

CLINICAL DATA:  Pain.



RIGHT KNEE: 



There is diffuse osteopenia.



No acute fracture or dislocation.



No lytic or blastic bone lesions.



No joint effusion.

MTDD Oriented - self; Oriented - place; Oriented - time

## 2022-02-02 ENCOUNTER — HOSPITAL ENCOUNTER (EMERGENCY)
Dept: HOSPITAL 60 - LB.ED | Age: 57
Discharge: LEFT BEFORE DISCHARGE | End: 2022-02-02
Payer: MEDICARE

## 2022-02-02 VITALS — HEART RATE: 96 BPM | SYSTOLIC BLOOD PRESSURE: 154 MMHG | DIASTOLIC BLOOD PRESSURE: 72 MMHG

## 2022-02-02 DIAGNOSIS — J44.9: ICD-10-CM

## 2022-02-02 DIAGNOSIS — E66.9: ICD-10-CM

## 2022-02-02 DIAGNOSIS — I10: ICD-10-CM

## 2022-02-02 DIAGNOSIS — Z88.8: ICD-10-CM

## 2022-02-02 DIAGNOSIS — E11.9: ICD-10-CM

## 2022-02-02 DIAGNOSIS — Z87.891: ICD-10-CM

## 2022-02-02 DIAGNOSIS — M25.572: Primary | ICD-10-CM

## 2022-02-02 DIAGNOSIS — Z79.82: ICD-10-CM

## 2022-02-02 DIAGNOSIS — Z53.8: ICD-10-CM

## 2022-02-02 DIAGNOSIS — Z91.048: ICD-10-CM

## 2022-02-02 DIAGNOSIS — Z79.899: ICD-10-CM

## 2022-02-02 DIAGNOSIS — Z88.4: ICD-10-CM

## 2022-02-04 ENCOUNTER — HOSPITAL ENCOUNTER (EMERGENCY)
Dept: HOSPITAL 60 - LB.ED | Age: 57
Discharge: HOME | End: 2022-02-04
Payer: MEDICARE

## 2022-02-04 VITALS — DIASTOLIC BLOOD PRESSURE: 55 MMHG | HEART RATE: 86 BPM | SYSTOLIC BLOOD PRESSURE: 142 MMHG

## 2022-02-04 DIAGNOSIS — Z88.4: ICD-10-CM

## 2022-02-04 DIAGNOSIS — E66.9: ICD-10-CM

## 2022-02-04 DIAGNOSIS — Z79.4: ICD-10-CM

## 2022-02-04 DIAGNOSIS — Z91.048: ICD-10-CM

## 2022-02-04 DIAGNOSIS — Z79.899: ICD-10-CM

## 2022-02-04 DIAGNOSIS — E11.42: ICD-10-CM

## 2022-02-04 DIAGNOSIS — I10: ICD-10-CM

## 2022-02-04 DIAGNOSIS — J44.1: Primary | ICD-10-CM

## 2022-02-04 DIAGNOSIS — Z79.82: ICD-10-CM

## 2022-02-04 DIAGNOSIS — Z88.8: ICD-10-CM

## 2022-02-04 DIAGNOSIS — K21.9: ICD-10-CM

## 2022-03-03 ENCOUNTER — HOSPITAL ENCOUNTER (EMERGENCY)
Dept: HOSPITAL 60 - LB.ED | Age: 57
Discharge: HOME | End: 2022-03-03
Payer: MEDICARE

## 2022-03-03 DIAGNOSIS — K21.9: ICD-10-CM

## 2022-03-03 DIAGNOSIS — F41.9: ICD-10-CM

## 2022-03-03 DIAGNOSIS — I10: ICD-10-CM

## 2022-03-03 DIAGNOSIS — E66.9: ICD-10-CM

## 2022-03-03 DIAGNOSIS — Z79.4: ICD-10-CM

## 2022-03-03 DIAGNOSIS — F32.9: ICD-10-CM

## 2022-03-03 DIAGNOSIS — E11.9: ICD-10-CM

## 2022-03-03 DIAGNOSIS — Z79.899: ICD-10-CM

## 2022-03-03 DIAGNOSIS — Z79.82: ICD-10-CM

## 2022-03-03 DIAGNOSIS — Z88.8: ICD-10-CM

## 2022-03-03 DIAGNOSIS — J44.1: Primary | ICD-10-CM

## 2022-03-03 DIAGNOSIS — Z88.1: ICD-10-CM

## 2022-03-03 DIAGNOSIS — Z91.09: ICD-10-CM

## 2022-06-10 ENCOUNTER — HOSPITAL ENCOUNTER (EMERGENCY)
Dept: HOSPITAL 60 - LB.ED | Age: 57
LOS: 1 days | Discharge: HOME | End: 2022-06-11
Payer: MEDICARE

## 2022-06-10 VITALS — SYSTOLIC BLOOD PRESSURE: 125 MMHG | HEART RATE: 94 BPM | DIASTOLIC BLOOD PRESSURE: 66 MMHG

## 2022-06-10 DIAGNOSIS — Z79.899: ICD-10-CM

## 2022-06-10 DIAGNOSIS — E66.9: ICD-10-CM

## 2022-06-10 DIAGNOSIS — Z20.822: ICD-10-CM

## 2022-06-10 DIAGNOSIS — J44.1: Primary | ICD-10-CM

## 2022-06-10 DIAGNOSIS — K21.9: ICD-10-CM

## 2022-06-10 DIAGNOSIS — E11.9: ICD-10-CM

## 2022-06-10 DIAGNOSIS — I10: ICD-10-CM

## 2022-06-10 DIAGNOSIS — Z88.8: ICD-10-CM

## 2022-06-10 DIAGNOSIS — Z79.4: ICD-10-CM

## 2022-06-10 DIAGNOSIS — Z79.82: ICD-10-CM

## 2022-06-10 DIAGNOSIS — Z90.710: ICD-10-CM

## 2022-06-10 DIAGNOSIS — Z91.048: ICD-10-CM

## 2022-06-10 DIAGNOSIS — Z88.6: ICD-10-CM

## 2022-06-10 PROCEDURE — 85025 COMPLETE CBC W/AUTO DIFF WBC: CPT

## 2022-06-10 PROCEDURE — 99285 EMERGENCY DEPT VISIT HI MDM: CPT

## 2022-06-10 PROCEDURE — 71045 X-RAY EXAM CHEST 1 VIEW: CPT

## 2022-06-10 PROCEDURE — 84484 ASSAY OF TROPONIN QUANT: CPT

## 2022-06-10 PROCEDURE — U0002 COVID-19 LAB TEST NON-CDC: HCPCS

## 2022-06-10 PROCEDURE — 80048 BASIC METABOLIC PNL TOTAL CA: CPT

## 2022-06-10 PROCEDURE — 36415 COLL VENOUS BLD VENIPUNCTURE: CPT

## 2022-06-10 PROCEDURE — 93005 ELECTROCARDIOGRAM TRACING: CPT

## 2022-06-10 PROCEDURE — 85379 FIBRIN DEGRADATION QUANT: CPT

## 2022-06-10 PROCEDURE — 96374 THER/PROPH/DIAG INJ IV PUSH: CPT

## 2022-06-10 PROCEDURE — 94640 AIRWAY INHALATION TREATMENT: CPT

## 2022-07-23 ENCOUNTER — HOSPITAL ENCOUNTER (EMERGENCY)
Dept: HOSPITAL 60 - LB.ED | Age: 57
Discharge: HOME | End: 2022-07-23
Payer: MEDICARE

## 2022-07-23 VITALS — DIASTOLIC BLOOD PRESSURE: 74 MMHG | SYSTOLIC BLOOD PRESSURE: 132 MMHG | HEART RATE: 96 BPM

## 2022-07-23 DIAGNOSIS — Z88.1: ICD-10-CM

## 2022-07-23 DIAGNOSIS — I10: ICD-10-CM

## 2022-07-23 DIAGNOSIS — Z79.82: ICD-10-CM

## 2022-07-23 DIAGNOSIS — Z91.048: ICD-10-CM

## 2022-07-23 DIAGNOSIS — Z79.899: ICD-10-CM

## 2022-07-23 DIAGNOSIS — Z88.4: ICD-10-CM

## 2022-07-23 DIAGNOSIS — Z79.4: ICD-10-CM

## 2022-07-23 DIAGNOSIS — E66.9: ICD-10-CM

## 2022-07-23 DIAGNOSIS — J44.1: Primary | ICD-10-CM

## 2022-07-23 DIAGNOSIS — Z90.710: ICD-10-CM

## 2022-07-23 DIAGNOSIS — E11.9: ICD-10-CM

## 2022-07-23 DIAGNOSIS — Z88.8: ICD-10-CM

## 2022-07-23 PROCEDURE — 71045 X-RAY EXAM CHEST 1 VIEW: CPT

## 2022-07-23 PROCEDURE — 96365 THER/PROPH/DIAG IV INF INIT: CPT

## 2022-07-23 PROCEDURE — 84484 ASSAY OF TROPONIN QUANT: CPT

## 2022-07-23 PROCEDURE — 83880 ASSAY OF NATRIURETIC PEPTIDE: CPT

## 2022-07-23 PROCEDURE — 85025 COMPLETE CBC W/AUTO DIFF WBC: CPT

## 2022-07-23 PROCEDURE — 99285 EMERGENCY DEPT VISIT HI MDM: CPT

## 2022-07-23 PROCEDURE — 87040 BLOOD CULTURE FOR BACTERIA: CPT

## 2022-07-23 PROCEDURE — 99282 EMERGENCY DEPT VISIT SF MDM: CPT

## 2022-07-23 PROCEDURE — 93010 ELECTROCARDIOGRAM REPORT: CPT

## 2022-07-23 PROCEDURE — 93005 ELECTROCARDIOGRAM TRACING: CPT

## 2022-07-23 PROCEDURE — 83605 ASSAY OF LACTIC ACID: CPT

## 2022-07-23 PROCEDURE — 80053 COMPREHEN METABOLIC PANEL: CPT

## 2022-07-23 PROCEDURE — 96375 TX/PRO/DX INJ NEW DRUG ADDON: CPT

## 2022-07-23 PROCEDURE — 85379 FIBRIN DEGRADATION QUANT: CPT

## 2022-07-23 PROCEDURE — 36415 COLL VENOUS BLD VENIPUNCTURE: CPT

## 2022-07-23 PROCEDURE — 81001 URINALYSIS AUTO W/SCOPE: CPT

## 2022-07-23 RX ADMIN — METHYLPREDNISOLONE SODIUM SUCCINATE ONE MG: 125 INJECTION, POWDER, FOR SOLUTION INTRAMUSCULAR; INTRAVENOUS at 14:46

## 2022-07-23 RX ADMIN — BUDESONIDE ONE MG: 0.5 SUSPENSION RESPIRATORY (INHALATION) at 15:16

## 2022-08-20 ENCOUNTER — HOSPITAL ENCOUNTER (EMERGENCY)
Dept: HOSPITAL 60 - LB.ED | Age: 57
Discharge: HOME | End: 2022-08-20
Payer: MEDICARE

## 2022-08-20 VITALS — SYSTOLIC BLOOD PRESSURE: 155 MMHG | HEART RATE: 155 BPM | DIASTOLIC BLOOD PRESSURE: 77 MMHG

## 2022-08-20 DIAGNOSIS — Z91.048: ICD-10-CM

## 2022-08-20 DIAGNOSIS — E11.9: ICD-10-CM

## 2022-08-20 DIAGNOSIS — F41.9: Primary | ICD-10-CM

## 2022-08-20 DIAGNOSIS — Z79.899: ICD-10-CM

## 2022-08-20 DIAGNOSIS — J44.9: ICD-10-CM

## 2022-08-20 DIAGNOSIS — E66.9: ICD-10-CM

## 2022-08-20 DIAGNOSIS — I10: ICD-10-CM

## 2022-08-20 DIAGNOSIS — Z79.82: ICD-10-CM

## 2022-08-20 DIAGNOSIS — K21.9: ICD-10-CM

## 2023-01-01 ENCOUNTER — APPOINTMENT (OUTPATIENT)
Dept: GENERAL RADIOLOGY | Facility: OTHER | Age: 58
DRG: 388 | End: 2023-01-01
Attending: HOSPITALIST
Payer: MEDICARE

## 2023-01-01 ENCOUNTER — HOSPITAL ENCOUNTER (INPATIENT)
Facility: OTHER | Age: 58
LOS: 2 days | Discharge: HOME | DRG: 388 | End: 2023-11-25
Attending: FAMILY MEDICINE | Admitting: INTERNAL MEDICINE
Payer: MEDICARE

## 2023-01-01 ENCOUNTER — PATIENT OUTREACH (OUTPATIENT)
Dept: FAMILY MEDICINE | Facility: OTHER | Age: 58
End: 2023-01-01
Payer: MEDICARE

## 2023-01-01 ENCOUNTER — ANESTHESIA (OUTPATIENT)
Dept: EMERGENCY MEDICINE | Facility: OTHER | Age: 58
DRG: 388 | End: 2023-01-01
Payer: MEDICARE

## 2023-01-01 ENCOUNTER — ANESTHESIA EVENT (OUTPATIENT)
Dept: EMERGENCY MEDICINE | Facility: OTHER | Age: 58
DRG: 388 | End: 2023-01-01
Payer: MEDICARE

## 2023-01-01 ENCOUNTER — APPOINTMENT (OUTPATIENT)
Dept: CARDIOLOGY | Facility: OTHER | Age: 58
DRG: 388 | End: 2023-01-01
Attending: INTERNAL MEDICINE
Payer: MEDICARE

## 2023-01-01 ENCOUNTER — APPOINTMENT (OUTPATIENT)
Dept: CT IMAGING | Facility: OTHER | Age: 58
DRG: 388 | End: 2023-01-01
Attending: FAMILY MEDICINE
Payer: MEDICARE

## 2023-01-01 ENCOUNTER — APPOINTMENT (OUTPATIENT)
Dept: GENERAL RADIOLOGY | Facility: OTHER | Age: 58
DRG: 388 | End: 2023-01-01
Attending: STUDENT IN AN ORGANIZED HEALTH CARE EDUCATION/TRAINING PROGRAM
Payer: MEDICARE

## 2023-01-01 VITALS
DIASTOLIC BLOOD PRESSURE: 118 MMHG | OXYGEN SATURATION: 89 % | HEIGHT: 64 IN | SYSTOLIC BLOOD PRESSURE: 156 MMHG | HEART RATE: 110 BPM | WEIGHT: 244.05 LBS | TEMPERATURE: 97.2 F | BODY MASS INDEX: 41.67 KG/M2 | RESPIRATION RATE: 22 BRPM

## 2023-01-01 DIAGNOSIS — I47.19 MULTIFOCAL ATRIAL TACHYCARDIA (H): ICD-10-CM

## 2023-01-01 DIAGNOSIS — I50.9 HEART FAILURE, UNSPECIFIED HF CHRONICITY, UNSPECIFIED HEART FAILURE TYPE (H): Primary | ICD-10-CM

## 2023-01-01 DIAGNOSIS — K56.609 SMALL BOWEL OBSTRUCTION (H): ICD-10-CM

## 2023-01-01 DIAGNOSIS — E87.20 LACTIC ACIDOSIS: ICD-10-CM

## 2023-01-01 DIAGNOSIS — E87.1 HYPONATREMIA: ICD-10-CM

## 2023-01-01 DIAGNOSIS — J44.9 CHRONIC OBSTRUCTIVE PULMONARY DISEASE, UNSPECIFIED COPD TYPE (H): ICD-10-CM

## 2023-01-01 DIAGNOSIS — E11.9 TYPE 2 DIABETES MELLITUS WITHOUT COMPLICATION, UNSPECIFIED WHETHER LONG TERM INSULIN USE (H): ICD-10-CM

## 2023-01-01 LAB
ALBUMIN SERPL BCG-MCNC: 3.4 G/DL (ref 3.5–5.2)
ALBUMIN SERPL BCG-MCNC: 3.5 G/DL (ref 3.5–5.2)
ALBUMIN SERPL BCG-MCNC: 4.6 G/DL (ref 3.5–5.2)
ALBUMIN UR-MCNC: 20 MG/DL
ALLEN'S TEST: NO
ALP SERPL-CCNC: 69 U/L (ref 40–150)
ALP SERPL-CCNC: 74 U/L (ref 40–150)
ALP SERPL-CCNC: 96 U/L (ref 40–150)
ALT SERPL W P-5'-P-CCNC: 40 U/L (ref 0–50)
ALT SERPL W P-5'-P-CCNC: 43 U/L (ref 0–50)
ALT SERPL W P-5'-P-CCNC: 64 U/L (ref 0–50)
ANION GAP SERPL CALCULATED.3IONS-SCNC: 11 MMOL/L (ref 7–15)
ANION GAP SERPL CALCULATED.3IONS-SCNC: 13 MMOL/L (ref 7–15)
ANION GAP SERPL CALCULATED.3IONS-SCNC: 14 MMOL/L (ref 7–15)
ANION GAP SERPL CALCULATED.3IONS-SCNC: 17 MMOL/L (ref 7–15)
APPEARANCE UR: CLEAR
APTT PPP: 30 SECONDS (ref 22–38)
APTT PPP: 57 SECONDS (ref 22–38)
APTT PPP: 60 SECONDS (ref 22–38)
AST SERPL W P-5'-P-CCNC: 18 U/L (ref 0–45)
AST SERPL W P-5'-P-CCNC: 28 U/L (ref 0–45)
AST SERPL W P-5'-P-CCNC: ABNORMAL U/L
ATRIAL RATE - MUSE: 123 BPM
ATRIAL RATE - MUSE: 127 BPM
ATRIAL RATE - MUSE: 131 BPM
BACTERIA UR CULT: ABNORMAL
BASE EXCESS BLDA CALC-SCNC: 4.8 MMOL/L (ref -9–1.8)
BASOPHILS # BLD AUTO: 0.1 10E3/UL (ref 0–0.2)
BASOPHILS # BLD AUTO: 0.1 10E3/UL (ref 0–0.2)
BASOPHILS NFR BLD AUTO: 0 %
BASOPHILS NFR BLD AUTO: 0 %
BILIRUB SERPL-MCNC: 0.5 MG/DL
BILIRUB SERPL-MCNC: 0.6 MG/DL
BILIRUB SERPL-MCNC: 0.8 MG/DL
BILIRUB UR QL STRIP: NEGATIVE
BUN SERPL-MCNC: 11.5 MG/DL (ref 6–20)
BUN SERPL-MCNC: 17.3 MG/DL (ref 6–20)
BUN SERPL-MCNC: 18.4 MG/DL (ref 6–20)
BUN SERPL-MCNC: 19.7 MG/DL (ref 6–20)
CALCIUM SERPL-MCNC: 10.7 MG/DL (ref 8.6–10)
CALCIUM SERPL-MCNC: 7.8 MG/DL (ref 8.6–10)
CALCIUM SERPL-MCNC: 8.7 MG/DL (ref 8.6–10)
CALCIUM SERPL-MCNC: 9.8 MG/DL (ref 8.6–10)
CHLORIDE SERPL-SCNC: 83 MMOL/L (ref 98–107)
CHLORIDE SERPL-SCNC: 87 MMOL/L (ref 98–107)
CHLORIDE SERPL-SCNC: 93 MMOL/L (ref 98–107)
CHLORIDE SERPL-SCNC: 98 MMOL/L (ref 98–107)
COLOR UR AUTO: YELLOW
CREAT SERPL-MCNC: 0.64 MG/DL (ref 0.51–0.95)
CREAT SERPL-MCNC: 0.85 MG/DL (ref 0.51–0.95)
CREAT SERPL-MCNC: 0.95 MG/DL (ref 0.51–0.95)
CREAT SERPL-MCNC: 1 MG/DL (ref 0.51–0.95)
DEPRECATED HCO3 PLAS-SCNC: 23 MMOL/L (ref 22–29)
DEPRECATED HCO3 PLAS-SCNC: 27 MMOL/L (ref 22–29)
DEPRECATED HCO3 PLAS-SCNC: 28 MMOL/L (ref 22–29)
DEPRECATED HCO3 PLAS-SCNC: 29 MMOL/L (ref 22–29)
DIASTOLIC BLOOD PRESSURE - MUSE: NORMAL MMHG
EGFRCR SERPLBLD CKD-EPI 2021: 65 ML/MIN/1.73M2
EGFRCR SERPLBLD CKD-EPI 2021: 69 ML/MIN/1.73M2
EGFRCR SERPLBLD CKD-EPI 2021: 79 ML/MIN/1.73M2
EGFRCR SERPLBLD CKD-EPI 2021: >90 ML/MIN/1.73M2
EOSINOPHIL # BLD AUTO: 0 10E3/UL (ref 0–0.7)
EOSINOPHIL # BLD AUTO: 0 10E3/UL (ref 0–0.7)
EOSINOPHIL NFR BLD AUTO: 0 %
EOSINOPHIL NFR BLD AUTO: 0 %
ERYTHROCYTE [DISTWIDTH] IN BLOOD BY AUTOMATED COUNT: 14.1 % (ref 10–15)
ERYTHROCYTE [DISTWIDTH] IN BLOOD BY AUTOMATED COUNT: 14.2 % (ref 10–15)
GLUCOSE BLDC GLUCOMTR-MCNC: 101 MG/DL (ref 70–99)
GLUCOSE BLDC GLUCOMTR-MCNC: 107 MG/DL (ref 70–99)
GLUCOSE BLDC GLUCOMTR-MCNC: 111 MG/DL (ref 70–99)
GLUCOSE BLDC GLUCOMTR-MCNC: 120 MG/DL (ref 70–99)
GLUCOSE BLDC GLUCOMTR-MCNC: 127 MG/DL (ref 70–99)
GLUCOSE BLDC GLUCOMTR-MCNC: 127 MG/DL (ref 70–99)
GLUCOSE BLDC GLUCOMTR-MCNC: 93 MG/DL (ref 70–99)
GLUCOSE SERPL-MCNC: 103 MG/DL (ref 70–99)
GLUCOSE SERPL-MCNC: 108 MG/DL (ref 70–99)
GLUCOSE SERPL-MCNC: 137 MG/DL (ref 70–99)
GLUCOSE SERPL-MCNC: 138 MG/DL (ref 70–99)
GLUCOSE UR STRIP-MCNC: NEGATIVE MG/DL
HCO3 BLD-SCNC: 32 MMOL/L (ref 21–28)
HCT VFR BLD AUTO: 39.9 % (ref 35–47)
HCT VFR BLD AUTO: 42.9 % (ref 35–47)
HCT VFR BLD AUTO: 46 % (ref 35–47)
HCT VFR BLD AUTO: 50 % (ref 35–47)
HGB BLD-MCNC: 13.2 G/DL (ref 11.7–15.7)
HGB BLD-MCNC: 14.4 G/DL (ref 11.7–15.7)
HGB BLD-MCNC: 15.7 G/DL (ref 11.7–15.7)
HGB BLD-MCNC: 17.3 G/DL (ref 11.7–15.7)
HGB UR QL STRIP: NEGATIVE
HOLD SPECIMEN: NORMAL
IMM GRANULOCYTES # BLD: 0.1 10E3/UL
IMM GRANULOCYTES # BLD: 0.2 10E3/UL
IMM GRANULOCYTES NFR BLD: 1 %
IMM GRANULOCYTES NFR BLD: 1 %
INTERPRETATION ECG - MUSE: NORMAL
KETONES UR STRIP-MCNC: NEGATIVE MG/DL
LACTATE SERPL-SCNC: 1.8 MMOL/L (ref 0.7–2)
LACTATE SERPL-SCNC: 3 MMOL/L (ref 0.7–2)
LACTATE SERPL-SCNC: 3.1 MMOL/L (ref 0.7–2)
LACTATE SERPL-SCNC: 4.4 MMOL/L (ref 0.7–2)
LEUKOCYTE ESTERASE UR QL STRIP: ABNORMAL
LIPASE SERPL-CCNC: 7 U/L (ref 13–60)
LVEF ECHO: NORMAL
LYMPHOCYTES # BLD AUTO: 0.7 10E3/UL (ref 0.8–5.3)
LYMPHOCYTES # BLD AUTO: 1 10E3/UL (ref 0.8–5.3)
LYMPHOCYTES NFR BLD AUTO: 4 %
LYMPHOCYTES NFR BLD AUTO: 9 %
MAGNESIUM SERPL-MCNC: 2 MG/DL (ref 1.7–2.3)
MCH RBC QN AUTO: 30.9 PG (ref 26.5–33)
MCH RBC QN AUTO: 31 PG (ref 26.5–33)
MCH RBC QN AUTO: 31.2 PG (ref 26.5–33)
MCH RBC QN AUTO: 31.2 PG (ref 26.5–33)
MCHC RBC AUTO-ENTMCNC: 33.1 G/DL (ref 31.5–36.5)
MCHC RBC AUTO-ENTMCNC: 33.6 G/DL (ref 31.5–36.5)
MCHC RBC AUTO-ENTMCNC: 34.1 G/DL (ref 31.5–36.5)
MCHC RBC AUTO-ENTMCNC: 34.6 G/DL (ref 31.5–36.5)
MCV RBC AUTO: 90 FL (ref 78–100)
MCV RBC AUTO: 91 FL (ref 78–100)
MCV RBC AUTO: 92 FL (ref 78–100)
MCV RBC AUTO: 94 FL (ref 78–100)
MONOCYTES # BLD AUTO: 1.3 10E3/UL (ref 0–1.3)
MONOCYTES # BLD AUTO: 1.7 10E3/UL (ref 0–1.3)
MONOCYTES NFR BLD AUTO: 11 %
MONOCYTES NFR BLD AUTO: 8 %
NEUTROPHILS # BLD AUTO: 18.4 10E3/UL (ref 1.6–8.3)
NEUTROPHILS # BLD AUTO: 9.1 10E3/UL (ref 1.6–8.3)
NEUTROPHILS NFR BLD AUTO: 79 %
NEUTROPHILS NFR BLD AUTO: 87 %
NITRATE UR QL: NEGATIVE
NRBC # BLD AUTO: 0 10E3/UL
NRBC # BLD AUTO: 0 10E3/UL
NRBC BLD AUTO-RTO: 0 /100
NRBC BLD AUTO-RTO: 0 /100
NT-PROBNP SERPL-MCNC: 404 PG/ML (ref 0–900)
O2/TOTAL GAS SETTING VFR VENT: 28 %
OXYHGB MFR BLD: 86 % (ref 92–100)
P AXIS - MUSE: 38 DEGREES
P AXIS - MUSE: 41 DEGREES
P AXIS - MUSE: NORMAL DEGREES
PCO2 BLD: 53 MM HG (ref 35–45)
PH BLD: 7.38 [PH] (ref 7.35–7.45)
PH UR STRIP: 6 [PH] (ref 5–9)
PHOSPHATE SERPL-MCNC: 5.5 MG/DL (ref 2.5–4.5)
PLATELET # BLD AUTO: 120 10E3/UL (ref 150–450)
PLATELET # BLD AUTO: 182 10E3/UL (ref 150–450)
PLATELET # BLD AUTO: 195 10E3/UL (ref 150–450)
PLATELET # BLD AUTO: 244 10E3/UL (ref 150–450)
PO2 BLD: 59 MM HG (ref 80–105)
POTASSIUM SERPL-SCNC: 3.6 MMOL/L (ref 3.4–5.3)
POTASSIUM SERPL-SCNC: 3.8 MMOL/L (ref 3.4–5.3)
POTASSIUM SERPL-SCNC: 3.9 MMOL/L (ref 3.4–5.3)
POTASSIUM SERPL-SCNC: 4.5 MMOL/L (ref 3.4–5.3)
PR INTERVAL - MUSE: 148 MS
PR INTERVAL - MUSE: 158 MS
PR INTERVAL - MUSE: NORMAL MS
PROT SERPL-MCNC: 5.4 G/DL (ref 6.4–8.3)
PROT SERPL-MCNC: 5.9 G/DL (ref 6.4–8.3)
PROT SERPL-MCNC: 7.4 G/DL (ref 6.4–8.3)
QRS DURATION - MUSE: 108 MS
QRS DURATION - MUSE: 82 MS
QRS DURATION - MUSE: 86 MS
QT - MUSE: 294 MS
QT - MUSE: 330 MS
QT - MUSE: 424 MS
QTC - MUSE: 472 MS
QTC - MUSE: 497 MS
QTC - MUSE: 616 MS
R AXIS - MUSE: 39 DEGREES
R AXIS - MUSE: 52 DEGREES
R AXIS - MUSE: 54 DEGREES
RBC # BLD AUTO: 4.23 10E6/UL (ref 3.8–5.2)
RBC # BLD AUTO: 4.66 10E6/UL (ref 3.8–5.2)
RBC # BLD AUTO: 5.06 10E6/UL (ref 3.8–5.2)
RBC # BLD AUTO: 5.55 10E6/UL (ref 3.8–5.2)
RBC URINE: 3 /HPF
SODIUM SERPL-SCNC: 127 MMOL/L (ref 135–145)
SODIUM SERPL-SCNC: 129 MMOL/L (ref 135–145)
SODIUM SERPL-SCNC: 132 MMOL/L (ref 135–145)
SODIUM SERPL-SCNC: 135 MMOL/L (ref 135–145)
SP GR UR STRIP: <=1.005 (ref 1–1.03)
SQUAMOUS EPITHELIAL: 3 /HPF
SYSTOLIC BLOOD PRESSURE - MUSE: NORMAL MMHG
T AXIS - MUSE: 240 DEGREES
T AXIS - MUSE: 32 DEGREES
T AXIS - MUSE: 38 DEGREES
TSH SERPL DL<=0.005 MIU/L-ACNC: 2.17 UIU/ML (ref 0.3–4.2)
UROBILINOGEN UR STRIP-MCNC: NORMAL MG/DL
VENTRICULAR RATE- MUSE: 123 BPM
VENTRICULAR RATE- MUSE: 127 BPM
VENTRICULAR RATE- MUSE: 172 BPM
WBC # BLD AUTO: 11.5 10E3/UL (ref 4–11)
WBC # BLD AUTO: 13.5 10E3/UL (ref 4–11)
WBC # BLD AUTO: 21.1 10E3/UL (ref 4–11)
WBC # BLD AUTO: 8.9 10E3/UL (ref 4–11)
WBC URINE: 21 /HPF

## 2023-01-01 PROCEDURE — 80053 COMPREHEN METABOLIC PANEL: CPT | Performed by: FAMILY MEDICINE

## 2023-01-01 PROCEDURE — 250N000009 HC RX 250: Performed by: INTERNAL MEDICINE

## 2023-01-01 PROCEDURE — 99285 EMERGENCY DEPT VISIT HI MDM: CPT | Performed by: FAMILY MEDICINE

## 2023-01-01 PROCEDURE — 74177 CT ABD & PELVIS W/CONTRAST: CPT | Mod: MA

## 2023-01-01 PROCEDURE — 999N000157 HC STATISTIC RCP TIME EA 10 MIN

## 2023-01-01 PROCEDURE — 250N000013 HC RX MED GY IP 250 OP 250 PS 637: Performed by: INTERNAL MEDICINE

## 2023-01-01 PROCEDURE — 250N000011 HC RX IP 250 OP 636: Mod: JZ | Performed by: FAMILY MEDICINE

## 2023-01-01 PROCEDURE — 84443 ASSAY THYROID STIM HORMONE: CPT | Performed by: INTERNAL MEDICINE

## 2023-01-01 PROCEDURE — 99222 1ST HOSP IP/OBS MODERATE 55: CPT | Mod: AI | Performed by: INTERNAL MEDICINE

## 2023-01-01 PROCEDURE — 200N000001 HC R&B ICU

## 2023-01-01 PROCEDURE — 250N000013 HC RX MED GY IP 250 OP 250 PS 637: Performed by: FAMILY MEDICINE

## 2023-01-01 PROCEDURE — 258N000003 HC RX IP 258 OP 636: Performed by: INTERNAL MEDICINE

## 2023-01-01 PROCEDURE — 83880 ASSAY OF NATRIURETIC PEPTIDE: CPT | Performed by: STUDENT IN AN ORGANIZED HEALTH CARE EDUCATION/TRAINING PROGRAM

## 2023-01-01 PROCEDURE — 99223 1ST HOSP IP/OBS HIGH 75: CPT | Mod: 25 | Performed by: SURGERY

## 2023-01-01 PROCEDURE — 250N000011 HC RX IP 250 OP 636: Performed by: INTERNAL MEDICINE

## 2023-01-01 PROCEDURE — 81001 URINALYSIS AUTO W/SCOPE: CPT | Performed by: INTERNAL MEDICINE

## 2023-01-01 PROCEDURE — 258N000003 HC RX IP 258 OP 636: Performed by: FAMILY MEDICINE

## 2023-01-01 PROCEDURE — 250N000011 HC RX IP 250 OP 636: Mod: JZ | Performed by: INTERNAL MEDICINE

## 2023-01-01 PROCEDURE — 83690 ASSAY OF LIPASE: CPT | Performed by: FAMILY MEDICINE

## 2023-01-01 PROCEDURE — 250N000013 HC RX MED GY IP 250 OP 250 PS 637: Performed by: HOSPITALIST

## 2023-01-01 PROCEDURE — 36415 COLL VENOUS BLD VENIPUNCTURE: CPT | Performed by: INTERNAL MEDICINE

## 2023-01-01 PROCEDURE — 96365 THER/PROPH/DIAG IV INF INIT: CPT | Mod: XU | Performed by: FAMILY MEDICINE

## 2023-01-01 PROCEDURE — 80053 COMPREHEN METABOLIC PANEL: CPT | Performed by: INTERNAL MEDICINE

## 2023-01-01 PROCEDURE — 94640 AIRWAY INHALATION TREATMENT: CPT

## 2023-01-01 PROCEDURE — 87086 URINE CULTURE/COLONY COUNT: CPT | Performed by: INTERNAL MEDICINE

## 2023-01-01 PROCEDURE — 93306 TTE W/DOPPLER COMPLETE: CPT | Mod: 26 | Performed by: INTERNAL MEDICINE

## 2023-01-01 PROCEDURE — 36410 VNPNXR 3YR/> PHY/QHP DX/THER: CPT

## 2023-01-01 PROCEDURE — 250N000011 HC RX IP 250 OP 636: Performed by: FAMILY MEDICINE

## 2023-01-01 PROCEDURE — 999N000208 ECHOCARDIOGRAM COMPLETE

## 2023-01-01 PROCEDURE — 250N000012 HC RX MED GY IP 250 OP 636 PS 637: Performed by: INTERNAL MEDICINE

## 2023-01-01 PROCEDURE — 84155 ASSAY OF PROTEIN SERUM: CPT | Performed by: INTERNAL MEDICINE

## 2023-01-01 PROCEDURE — 82805 BLOOD GASES W/O2 SATURATION: CPT | Performed by: INTERNAL MEDICINE

## 2023-01-01 PROCEDURE — 99239 HOSP IP/OBS DSCHRG MGMT >30: CPT | Performed by: INTERNAL MEDICINE

## 2023-01-01 PROCEDURE — 96376 TX/PRO/DX INJ SAME DRUG ADON: CPT | Performed by: FAMILY MEDICINE

## 2023-01-01 PROCEDURE — 85027 COMPLETE CBC AUTOMATED: CPT | Performed by: INTERNAL MEDICINE

## 2023-01-01 PROCEDURE — 250N000011 HC RX IP 250 OP 636: Performed by: STUDENT IN AN ORGANIZED HEALTH CARE EDUCATION/TRAINING PROGRAM

## 2023-01-01 PROCEDURE — 36600 WITHDRAWAL OF ARTERIAL BLOOD: CPT | Performed by: INTERNAL MEDICINE

## 2023-01-01 PROCEDURE — 99231 SBSQ HOSP IP/OBS SF/LOW 25: CPT | Mod: 25 | Performed by: SURGERY

## 2023-01-01 PROCEDURE — 250N000009 HC RX 250: Performed by: STUDENT IN AN ORGANIZED HEALTH CARE EDUCATION/TRAINING PROGRAM

## 2023-01-01 PROCEDURE — 99285 EMERGENCY DEPT VISIT HI MDM: CPT | Mod: 25 | Performed by: FAMILY MEDICINE

## 2023-01-01 PROCEDURE — 71045 X-RAY EXAM CHEST 1 VIEW: CPT | Mod: TC

## 2023-01-01 PROCEDURE — 255N000002 HC RX 255 OP 636: Performed by: INTERNAL MEDICINE

## 2023-01-01 PROCEDURE — 83605 ASSAY OF LACTIC ACID: CPT | Performed by: FAMILY MEDICINE

## 2023-01-01 PROCEDURE — 71045 X-RAY EXAM CHEST 1 VIEW: CPT

## 2023-01-01 PROCEDURE — 96375 TX/PRO/DX INJ NEW DRUG ADDON: CPT | Performed by: FAMILY MEDICINE

## 2023-01-01 PROCEDURE — 85025 COMPLETE CBC W/AUTO DIFF WBC: CPT | Performed by: FAMILY MEDICINE

## 2023-01-01 PROCEDURE — 85730 THROMBOPLASTIN TIME PARTIAL: CPT | Performed by: INTERNAL MEDICINE

## 2023-01-01 PROCEDURE — 93005 ELECTROCARDIOGRAM TRACING: CPT | Performed by: FAMILY MEDICINE

## 2023-01-01 PROCEDURE — 83735 ASSAY OF MAGNESIUM: CPT | Performed by: INTERNAL MEDICINE

## 2023-01-01 PROCEDURE — 36415 COLL VENOUS BLD VENIPUNCTURE: CPT | Performed by: FAMILY MEDICINE

## 2023-01-01 PROCEDURE — 93010 ELECTROCARDIOGRAM REPORT: CPT | Performed by: INTERNAL MEDICINE

## 2023-01-01 PROCEDURE — 84100 ASSAY OF PHOSPHORUS: CPT | Performed by: INTERNAL MEDICINE

## 2023-01-01 PROCEDURE — 99233 SBSQ HOSP IP/OBS HIGH 50: CPT | Performed by: INTERNAL MEDICINE

## 2023-01-01 PROCEDURE — 36416 COLLJ CAPILLARY BLOOD SPEC: CPT | Performed by: FAMILY MEDICINE

## 2023-01-01 RX ORDER — NICOTINE POLACRILEX 4 MG
15-30 LOZENGE BUCCAL
Status: DISCONTINUED | OUTPATIENT
Start: 2023-01-01 | End: 2023-01-01

## 2023-01-01 RX ORDER — IPRATROPIUM BROMIDE AND ALBUTEROL SULFATE 2.5; .5 MG/3ML; MG/3ML
1 SOLUTION RESPIRATORY (INHALATION)
Status: DISCONTINUED | OUTPATIENT
Start: 2023-01-01 | End: 2023-01-01 | Stop reason: HOSPADM

## 2023-01-01 RX ORDER — BENZONATATE 200 MG/1
200 CAPSULE ORAL 3 TIMES DAILY PRN
COMMUNITY
Start: 2023-01-01

## 2023-01-01 RX ORDER — LANOLIN ALCOHOL/MO/W.PET/CERES
3 CREAM (GRAM) TOPICAL
Status: DISCONTINUED | OUTPATIENT
Start: 2023-01-01 | End: 2023-01-01 | Stop reason: HOSPADM

## 2023-01-01 RX ORDER — TOPIRAMATE 25 MG/1
25 TABLET, FILM COATED ORAL 2 TIMES DAILY
COMMUNITY

## 2023-01-01 RX ORDER — APIXABAN 5 MG/1
5 TABLET, FILM COATED ORAL 2 TIMES DAILY
COMMUNITY

## 2023-01-01 RX ORDER — IPRATROPIUM BROMIDE AND ALBUTEROL SULFATE 2.5; .5 MG/3ML; MG/3ML
1 SOLUTION RESPIRATORY (INHALATION) 4 TIMES DAILY
COMMUNITY

## 2023-01-01 RX ORDER — INSULIN LISPRO 100 [IU]/ML
15 INJECTION, SOLUTION INTRAVENOUS; SUBCUTANEOUS
Status: ON HOLD | COMMUNITY
Start: 2023-01-01 | End: 2023-01-01

## 2023-01-01 RX ORDER — CEFEPIME HYDROCHLORIDE 2 G/1
2 INJECTION, POWDER, FOR SOLUTION INTRAVENOUS ONCE
Status: COMPLETED | OUTPATIENT
Start: 2023-01-01 | End: 2023-01-01

## 2023-01-01 RX ORDER — METOCLOPRAMIDE HYDROCHLORIDE 5 MG/ML
5 INJECTION INTRAMUSCULAR; INTRAVENOUS ONCE
Status: COMPLETED | OUTPATIENT
Start: 2023-01-01 | End: 2023-01-01

## 2023-01-01 RX ORDER — INSULIN LISPRO 100 [IU]/ML
INJECTION, SOLUTION INTRAVENOUS; SUBCUTANEOUS
COMMUNITY

## 2023-01-01 RX ORDER — HYDROMORPHONE HCL IN WATER/PF 6 MG/30 ML
0.2 PATIENT CONTROLLED ANALGESIA SYRINGE INTRAVENOUS
Status: DISCONTINUED | OUTPATIENT
Start: 2023-01-01 | End: 2023-01-01 | Stop reason: HOSPADM

## 2023-01-01 RX ORDER — BUPROPION HYDROCHLORIDE 100 MG/1
100 TABLET ORAL 2 TIMES DAILY
Status: DISCONTINUED | OUTPATIENT
Start: 2023-01-01 | End: 2023-01-01

## 2023-01-01 RX ORDER — QUETIAPINE FUMARATE 100 MG/1
400 TABLET, FILM COATED ORAL AT BEDTIME
Status: DISCONTINUED | OUTPATIENT
Start: 2023-01-01 | End: 2023-01-01 | Stop reason: HOSPADM

## 2023-01-01 RX ORDER — CEFUROXIME AXETIL 500 MG/1
1 TABLET ORAL
Status: ON HOLD | COMMUNITY
Start: 2023-01-01 | End: 2023-01-01

## 2023-01-01 RX ORDER — OXCARBAZEPINE 300 MG/1
150 TABLET, FILM COATED ORAL 2 TIMES DAILY
Status: DISCONTINUED | OUTPATIENT
Start: 2023-01-01 | End: 2023-01-01

## 2023-01-01 RX ORDER — NICOTINE POLACRILEX 4 MG
15-30 LOZENGE BUCCAL
Status: DISCONTINUED | OUTPATIENT
Start: 2023-01-01 | End: 2023-01-01 | Stop reason: HOSPADM

## 2023-01-01 RX ORDER — SODIUM CHLORIDE 9 MG/ML
INJECTION, SOLUTION INTRAVENOUS CONTINUOUS
Status: DISCONTINUED | OUTPATIENT
Start: 2023-01-01 | End: 2023-01-01 | Stop reason: HOSPADM

## 2023-01-01 RX ORDER — ALBUTEROL SULFATE 90 UG/1
1-2 AEROSOL, METERED RESPIRATORY (INHALATION) EVERY 6 HOURS PRN
COMMUNITY

## 2023-01-01 RX ORDER — METHYLPREDNISOLONE SODIUM SUCCINATE 40 MG/ML
40 INJECTION, POWDER, LYOPHILIZED, FOR SOLUTION INTRAMUSCULAR; INTRAVENOUS EVERY 24 HOURS
Status: DISCONTINUED | OUTPATIENT
Start: 2023-01-01 | End: 2023-01-01 | Stop reason: HOSPADM

## 2023-01-01 RX ORDER — ORAL SEMAGLUTIDE 14 MG/1
1 TABLET ORAL DAILY
COMMUNITY
Start: 2023-01-01

## 2023-01-01 RX ORDER — MAGNESIUM OXIDE 400 MG/1
400 TABLET ORAL DAILY
COMMUNITY

## 2023-01-01 RX ORDER — MELOXICAM 7.5 MG/1
7.5 TABLET ORAL DAILY
COMMUNITY

## 2023-01-01 RX ORDER — VARENICLINE TARTRATE 1 MG/1
1 TABLET, FILM COATED ORAL 2 TIMES DAILY
COMMUNITY

## 2023-01-01 RX ORDER — POTASSIUM CHLORIDE 750 MG/1
1 TABLET, EXTENDED RELEASE ORAL
COMMUNITY
Start: 2023-01-01

## 2023-01-01 RX ORDER — SENNOSIDES 8.6 MG
650 CAPSULE ORAL EVERY 8 HOURS PRN
COMMUNITY

## 2023-01-01 RX ORDER — OXCARBAZEPINE 150 MG/1
300 TABLET, FILM COATED ORAL 2 TIMES DAILY
COMMUNITY

## 2023-01-01 RX ORDER — RISPERIDONE 2 MG/1
5 TABLET ORAL AT BEDTIME
COMMUNITY

## 2023-01-01 RX ORDER — MIRTAZAPINE 45 MG/1
45 TABLET, FILM COATED ORAL AT BEDTIME
COMMUNITY

## 2023-01-01 RX ORDER — ASPIRIN 81 MG/1
81 TABLET, DELAYED RELEASE ORAL DAILY
COMMUNITY

## 2023-01-01 RX ORDER — INSULIN GLARGINE 100 [IU]/ML
35 INJECTION, SOLUTION SUBCUTANEOUS AT BEDTIME
COMMUNITY
Start: 2023-01-01

## 2023-01-01 RX ORDER — FENTANYL CITRATE 50 UG/ML
50 INJECTION, SOLUTION INTRAMUSCULAR; INTRAVENOUS ONCE
Status: COMPLETED | OUTPATIENT
Start: 2023-01-01 | End: 2023-01-01

## 2023-01-01 RX ORDER — QUETIAPINE FUMARATE 50 MG/1
50 TABLET, FILM COATED ORAL 3 TIMES DAILY PRN
Status: ON HOLD | COMMUNITY
Start: 2023-01-01 | End: 2023-01-01

## 2023-01-01 RX ORDER — SPIRONOLACTONE 25 MG/1
25 TABLET ORAL
COMMUNITY
Start: 2023-01-01

## 2023-01-01 RX ORDER — HEPARIN SODIUM 10000 [USP'U]/100ML
0-5000 INJECTION, SOLUTION INTRAVENOUS CONTINUOUS
Status: DISCONTINUED | OUTPATIENT
Start: 2023-01-01 | End: 2023-01-01 | Stop reason: HOSPADM

## 2023-01-01 RX ORDER — POLYETHYLENE GLYCOL 3350 17 G/17G
1 POWDER, FOR SOLUTION ORAL
COMMUNITY

## 2023-01-01 RX ORDER — DEXTROSE MONOHYDRATE 25 G/50ML
25-50 INJECTION, SOLUTION INTRAVENOUS
Status: DISCONTINUED | OUTPATIENT
Start: 2023-01-01 | End: 2023-01-01 | Stop reason: HOSPADM

## 2023-01-01 RX ORDER — PREDNISONE 20 MG/1
2 TABLET ORAL
Status: ON HOLD | COMMUNITY
Start: 2023-01-01 | End: 2023-01-01

## 2023-01-01 RX ORDER — NICOTINE POLACRILEX 4 MG/1
20 GUM, CHEWING ORAL 2 TIMES DAILY WITH MEALS
COMMUNITY

## 2023-01-01 RX ORDER — IOPAMIDOL 755 MG/ML
100 INJECTION, SOLUTION INTRAVASCULAR ONCE
Status: COMPLETED | OUTPATIENT
Start: 2023-01-01 | End: 2023-01-01

## 2023-01-01 RX ORDER — FLUTICASONE FUROATE AND VILANTEROL 200; 25 UG/1; UG/1
1 POWDER RESPIRATORY (INHALATION) DAILY
Status: DISCONTINUED | OUTPATIENT
Start: 2023-01-01 | End: 2023-01-01 | Stop reason: HOSPADM

## 2023-01-01 RX ORDER — BUPROPION HYDROCHLORIDE 100 MG/1
100 TABLET ORAL 2 TIMES DAILY
Status: ON HOLD | COMMUNITY
Start: 2023-01-01 | End: 2023-01-01

## 2023-01-01 RX ORDER — ONDANSETRON 4 MG/1
4 TABLET, ORALLY DISINTEGRATING ORAL EVERY 6 HOURS PRN
Status: ON HOLD | COMMUNITY
End: 2023-01-01

## 2023-01-01 RX ORDER — CALCIUM CARBONATE/VITAMIN D3 600 MG-10
1 TABLET ORAL 2 TIMES DAILY
COMMUNITY

## 2023-01-01 RX ORDER — CODEINE PHOSPHATE AND GUAIFENESIN 10; 100 MG/5ML; MG/5ML
5 SOLUTION ORAL EVERY 4 HOURS PRN
COMMUNITY

## 2023-01-01 RX ORDER — QUETIAPINE FUMARATE 25 MG/1
50 TABLET, FILM COATED ORAL 3 TIMES DAILY PRN
Status: DISCONTINUED | OUTPATIENT
Start: 2023-01-01 | End: 2023-01-01 | Stop reason: HOSPADM

## 2023-01-01 RX ORDER — RISPERIDONE 1 MG/1
5 TABLET ORAL AT BEDTIME
Status: DISCONTINUED | OUTPATIENT
Start: 2023-01-01 | End: 2023-01-01 | Stop reason: HOSPADM

## 2023-01-01 RX ORDER — SULFAMETHOXAZOLE/TRIMETHOPRIM 800-160 MG
TABLET ORAL
COMMUNITY

## 2023-01-01 RX ORDER — METOPROLOL TARTRATE 1 MG/ML
5 INJECTION, SOLUTION INTRAVENOUS ONCE
Status: DISCONTINUED | OUTPATIENT
Start: 2023-01-01 | End: 2023-01-01

## 2023-01-01 RX ORDER — ONDANSETRON 2 MG/ML
4 INJECTION INTRAMUSCULAR; INTRAVENOUS EVERY 30 MIN PRN
Status: DISCONTINUED | OUTPATIENT
Start: 2023-01-01 | End: 2023-01-01 | Stop reason: HOSPADM

## 2023-01-01 RX ORDER — QUETIAPINE FUMARATE 400 MG/1
400 TABLET, FILM COATED ORAL AT BEDTIME
COMMUNITY

## 2023-01-01 RX ORDER — METOPROLOL SUCCINATE 25 MG/1
1 TABLET, EXTENDED RELEASE ORAL DAILY
COMMUNITY
Start: 2023-01-01

## 2023-01-01 RX ORDER — MAGNESIUM SULFATE HEPTAHYDRATE 40 MG/ML
2 INJECTION, SOLUTION INTRAVENOUS ONCE
Status: COMPLETED | OUTPATIENT
Start: 2023-01-01 | End: 2023-01-01

## 2023-01-01 RX ORDER — LIDOCAINE 40 MG/G
CREAM TOPICAL
Status: DISCONTINUED | OUTPATIENT
Start: 2023-01-01 | End: 2023-01-01 | Stop reason: HOSPADM

## 2023-01-01 RX ORDER — QUETIAPINE FUMARATE 100 MG/1
100 TABLET, FILM COATED ORAL DAILY
COMMUNITY
Start: 2023-01-01

## 2023-01-01 RX ORDER — PREDNISONE 10 MG/1
30 TABLET ORAL DAILY
COMMUNITY
Start: 2023-01-01

## 2023-01-01 RX ORDER — LORAZEPAM 2 MG/ML
2 INJECTION INTRAMUSCULAR ONCE
Status: COMPLETED | OUTPATIENT
Start: 2023-01-01 | End: 2023-01-01

## 2023-01-01 RX ORDER — UBIDECARENONE 200 MG
200 CAPSULE ORAL AT BEDTIME
COMMUNITY

## 2023-01-01 RX ORDER — METOPROLOL TARTRATE 1 MG/ML
5 INJECTION, SOLUTION INTRAVENOUS ONCE
Status: COMPLETED | OUTPATIENT
Start: 2023-01-01 | End: 2023-01-01

## 2023-01-01 RX ORDER — DOCUSATE SODIUM 100 MG/1
100 CAPSULE, LIQUID FILLED ORAL 2 TIMES DAILY PRN
COMMUNITY

## 2023-01-01 RX ORDER — BUDESONIDE AND FORMOTEROL FUMARATE DIHYDRATE 160; 4.5 UG/1; UG/1
2 AEROSOL RESPIRATORY (INHALATION)
COMMUNITY

## 2023-01-01 RX ORDER — OXCARBAZEPINE 300 MG/1
300 TABLET, FILM COATED ORAL 2 TIMES DAILY
Status: DISCONTINUED | OUTPATIENT
Start: 2023-01-01 | End: 2023-01-01 | Stop reason: HOSPADM

## 2023-01-01 RX ORDER — QUETIAPINE FUMARATE 50 MG/1
50 TABLET, FILM COATED ORAL 3 TIMES DAILY PRN
COMMUNITY

## 2023-01-01 RX ORDER — FUROSEMIDE 40 MG
40 TABLET ORAL DAILY
COMMUNITY

## 2023-01-01 RX ORDER — LOSARTAN POTASSIUM 25 MG/1
12.5 TABLET ORAL DAILY
COMMUNITY

## 2023-01-01 RX ORDER — DEXTROSE MONOHYDRATE 25 G/50ML
25-50 INJECTION, SOLUTION INTRAVENOUS
Status: DISCONTINUED | OUTPATIENT
Start: 2023-01-01 | End: 2023-01-01

## 2023-01-01 RX ORDER — ZOLPIDEM TARTRATE 10 MG/1
10 TABLET ORAL
COMMUNITY

## 2023-01-01 RX ORDER — ONDANSETRON 4 MG/1
4 TABLET, FILM COATED ORAL EVERY 8 HOURS PRN
COMMUNITY
Start: 2023-01-01

## 2023-01-01 RX ORDER — INSULIN LISPRO 100 [IU]/ML
20 INJECTION, SOLUTION INTRAVENOUS; SUBCUTANEOUS EVERY EVENING
Status: ON HOLD | COMMUNITY
End: 2023-01-01

## 2023-01-01 RX ORDER — POLYETHYLENE GLYCOL 3350 17 G
2 POWDER IN PACKET (EA) ORAL
COMMUNITY

## 2023-01-01 RX ORDER — TOPIRAMATE 25 MG/1
25 TABLET, FILM COATED ORAL 2 TIMES DAILY
Status: DISCONTINUED | OUTPATIENT
Start: 2023-01-01 | End: 2023-01-01 | Stop reason: HOSPADM

## 2023-01-01 RX ORDER — METOPROLOL TARTRATE 1 MG/ML
5 INJECTION, SOLUTION INTRAVENOUS EVERY 5 MIN PRN
Status: DISCONTINUED | OUTPATIENT
Start: 2023-01-01 | End: 2023-01-01 | Stop reason: HOSPADM

## 2023-01-01 RX ORDER — METOPROLOL TARTRATE 1 MG/ML
5 INJECTION, SOLUTION INTRAVENOUS EVERY 6 HOURS
Status: DISCONTINUED | OUTPATIENT
Start: 2023-01-01 | End: 2023-01-01 | Stop reason: HOSPADM

## 2023-01-01 RX ORDER — ROFLUMILAST 500 UG/1
500 TABLET ORAL DAILY
COMMUNITY

## 2023-01-01 RX ORDER — SITAGLIPTIN AND METFORMIN HYDROCHLORIDE 1000; 50 MG/1; MG/1
1 TABLET, FILM COATED ORAL 2 TIMES DAILY WITH MEALS
COMMUNITY
Start: 2023-01-01

## 2023-01-01 RX ORDER — NICOTINE 21 MG/24HR
1 PATCH, TRANSDERMAL 24 HOURS TRANSDERMAL DAILY
Status: DISCONTINUED | OUTPATIENT
Start: 2023-01-01 | End: 2023-01-01 | Stop reason: HOSPADM

## 2023-01-01 RX ORDER — METOCLOPRAMIDE HYDROCHLORIDE 5 MG/ML
10 INJECTION INTRAMUSCULAR; INTRAVENOUS ONCE
Status: DISCONTINUED | OUTPATIENT
Start: 2023-01-01 | End: 2023-01-01

## 2023-01-01 RX ORDER — TRAMADOL HYDROCHLORIDE 50 MG/1
50 TABLET ORAL EVERY 6 HOURS PRN
COMMUNITY

## 2023-01-01 RX ORDER — ALBUTEROL SULFATE 0.83 MG/ML
2.5 SOLUTION RESPIRATORY (INHALATION)
Status: DISCONTINUED | OUTPATIENT
Start: 2023-01-01 | End: 2023-01-01 | Stop reason: HOSPADM

## 2023-01-01 RX ORDER — DIPHENHYDRAMINE HYDROCHLORIDE 50 MG/ML
12.5 INJECTION INTRAMUSCULAR; INTRAVENOUS ONCE
Status: COMPLETED | OUTPATIENT
Start: 2023-01-01 | End: 2023-01-01

## 2023-01-01 RX ORDER — ROPINIROLE 0.5 MG/1
0.5 TABLET, FILM COATED ORAL AT BEDTIME
COMMUNITY

## 2023-01-01 RX ORDER — GABAPENTIN 600 MG/1
1200 TABLET ORAL 3 TIMES DAILY
COMMUNITY
Start: 2023-01-01

## 2023-01-01 RX ORDER — MAGNESIUM HYDROXIDE/ALUMINUM HYDROXICE/SIMETHICONE 120; 1200; 1200 MG/30ML; MG/30ML; MG/30ML
30 SUSPENSION ORAL EVERY 4 HOURS PRN
Status: DISCONTINUED | OUTPATIENT
Start: 2023-01-01 | End: 2023-01-01 | Stop reason: HOSPADM

## 2023-01-01 RX ORDER — METOPROLOL SUCCINATE 25 MG/1
25 TABLET, EXTENDED RELEASE ORAL DAILY
Status: DISCONTINUED | OUTPATIENT
Start: 2023-01-01 | End: 2023-01-01

## 2023-01-01 RX ORDER — MONTELUKAST SODIUM 10 MG/1
10 TABLET ORAL AT BEDTIME
COMMUNITY

## 2023-01-01 RX ORDER — SUMATRIPTAN 25 MG/1
25 TABLET, FILM COATED ORAL
COMMUNITY

## 2023-01-01 RX ORDER — HYDROMORPHONE HCL IN WATER/PF 6 MG/30 ML
0.4 PATIENT CONTROLLED ANALGESIA SYRINGE INTRAVENOUS
Status: DISCONTINUED | OUTPATIENT
Start: 2023-01-01 | End: 2023-01-01 | Stop reason: HOSPADM

## 2023-01-01 RX ORDER — QUETIAPINE FUMARATE 25 MG/1
100 TABLET, FILM COATED ORAL DAILY
Status: DISCONTINUED | OUTPATIENT
Start: 2023-01-01 | End: 2023-01-01 | Stop reason: HOSPADM

## 2023-01-01 RX ADMIN — ONDANSETRON HYDROCHLORIDE 4 MG: 2 SOLUTION INTRAMUSCULAR; INTRAVENOUS at 18:31

## 2023-01-01 RX ADMIN — HEPARIN SODIUM 1200 UNITS/HR: 10000 INJECTION, SOLUTION INTRAVENOUS at 23:01

## 2023-01-01 RX ADMIN — OXCARBAZEPINE 300 MG: 300 TABLET, FILM COATED ORAL at 21:21

## 2023-01-01 RX ADMIN — HYDROMORPHONE HYDROCHLORIDE 0.4 MG: 0.2 INJECTION, SOLUTION INTRAMUSCULAR; INTRAVENOUS; SUBCUTANEOUS at 16:09

## 2023-01-01 RX ADMIN — FAMOTIDINE 20 MG: 10 INJECTION, SOLUTION INTRAVENOUS at 21:20

## 2023-01-01 RX ADMIN — HYDROMORPHONE HYDROCHLORIDE 1 MG: 1 INJECTION, SOLUTION INTRAMUSCULAR; INTRAVENOUS; SUBCUTANEOUS at 19:26

## 2023-01-01 RX ADMIN — METOPROLOL TARTRATE 5 MG: 1 INJECTION, SOLUTION INTRAVENOUS at 01:28

## 2023-01-01 RX ADMIN — MAGNESIUM SULFATE HEPTAHYDRATE 2 G: 40 INJECTION, SOLUTION INTRAVENOUS at 19:18

## 2023-01-01 RX ADMIN — SODIUM CHLORIDE: 9 INJECTION, SOLUTION INTRAVENOUS at 04:09

## 2023-01-01 RX ADMIN — METOPROLOL TARTRATE 5 MG: 1 INJECTION, SOLUTION INTRAVENOUS at 08:08

## 2023-01-01 RX ADMIN — QUETIAPINE FUMARATE 50 MG: 25 TABLET ORAL at 05:41

## 2023-01-01 RX ADMIN — SODIUM CHLORIDE: 9 INJECTION, SOLUTION INTRAVENOUS at 21:51

## 2023-01-01 RX ADMIN — SODIUM CHLORIDE: 9 INJECTION, SOLUTION INTRAVENOUS at 17:43

## 2023-01-01 RX ADMIN — FAMOTIDINE 20 MG: 10 INJECTION, SOLUTION INTRAVENOUS at 12:17

## 2023-01-01 RX ADMIN — Medication 1 ML: at 09:00

## 2023-01-01 RX ADMIN — TOPIRAMATE 25 MG: 25 TABLET, FILM COATED ORAL at 21:21

## 2023-01-01 RX ADMIN — INSULIN GLARGINE 15 UNITS: 100 INJECTION, SOLUTION SUBCUTANEOUS at 21:21

## 2023-01-01 RX ADMIN — SODIUM CHLORIDE 1000 ML: 9 INJECTION, SOLUTION INTRAVENOUS at 16:21

## 2023-01-01 RX ADMIN — SODIUM CHLORIDE: 9 INJECTION, SOLUTION INTRAVENOUS at 00:53

## 2023-01-01 RX ADMIN — DIPHENHYDRAMINE HYDROCHLORIDE 12.5 MG: 50 INJECTION INTRAMUSCULAR; INTRAVENOUS at 17:45

## 2023-01-01 RX ADMIN — TOPIRAMATE 25 MG: 25 TABLET, FILM COATED ORAL at 14:55

## 2023-01-01 RX ADMIN — Medication 1 PATCH: at 20:35

## 2023-01-01 RX ADMIN — HYDROMORPHONE HYDROCHLORIDE 0.4 MG: 0.2 INJECTION, SOLUTION INTRAMUSCULAR; INTRAVENOUS; SUBCUTANEOUS at 22:51

## 2023-01-01 RX ADMIN — HYDROMORPHONE HYDROCHLORIDE 0.4 MG: 0.2 INJECTION, SOLUTION INTRAMUSCULAR; INTRAVENOUS; SUBCUTANEOUS at 07:49

## 2023-01-01 RX ADMIN — METOPROLOL TARTRATE 5 MG: 1 INJECTION, SOLUTION INTRAVENOUS at 19:16

## 2023-01-01 RX ADMIN — METOPROLOL TARTRATE 5 MG: 1 INJECTION, SOLUTION INTRAVENOUS at 04:10

## 2023-01-01 RX ADMIN — PERFLUTREN 3 ML: 6.52 INJECTION, SUSPENSION INTRAVENOUS at 11:02

## 2023-01-01 RX ADMIN — FENTANYL CITRATE 50 MCG: 50 INJECTION, SOLUTION INTRAMUSCULAR; INTRAVENOUS at 16:22

## 2023-01-01 RX ADMIN — ALUMINUM HYDROXIDE, MAGNESIUM HYDROXIDE, AND DIMETHICONE 30 ML: 200; 20; 200 SUSPENSION ORAL at 12:17

## 2023-01-01 RX ADMIN — SODIUM CHLORIDE: 9 INJECTION, SOLUTION INTRAVENOUS at 07:48

## 2023-01-01 RX ADMIN — QUETIAPINE 400 MG: 100 TABLET ORAL at 21:21

## 2023-01-01 RX ADMIN — Medication 1 PATCH: at 09:01

## 2023-01-01 RX ADMIN — OXCARBAZEPINE 150 MG: 300 TABLET, FILM COATED ORAL at 14:54

## 2023-01-01 RX ADMIN — CEFEPIME 2 G: 2 INJECTION, POWDER, FOR SOLUTION INTRAVENOUS at 18:32

## 2023-01-01 RX ADMIN — HEPARIN SODIUM 1200 UNITS/HR: 10000 INJECTION, SOLUTION INTRAVENOUS at 16:13

## 2023-01-01 RX ADMIN — SODIUM CHLORIDE 500 ML: 9 INJECTION, SOLUTION INTRAVENOUS at 01:40

## 2023-01-01 RX ADMIN — FLUTICASONE FUROATE AND VILANTEROL TRIFENATATE 1 PUFF: 200; 25 POWDER RESPIRATORY (INHALATION) at 07:53

## 2023-01-01 RX ADMIN — METOCLOPRAMIDE 5 MG: 5 INJECTION, SOLUTION INTRAMUSCULAR; INTRAVENOUS at 17:46

## 2023-01-01 RX ADMIN — METOPROLOL TARTRATE 5 MG: 1 INJECTION, SOLUTION INTRAVENOUS at 01:44

## 2023-01-01 RX ADMIN — HYDROMORPHONE HYDROCHLORIDE 0.2 MG: 0.2 INJECTION, SOLUTION INTRAMUSCULAR; INTRAVENOUS; SUBCUTANEOUS at 21:48

## 2023-01-01 RX ADMIN — METOPROLOL TARTRATE 5 MG: 1 INJECTION, SOLUTION INTRAVENOUS at 16:08

## 2023-01-01 RX ADMIN — HYDROMORPHONE HYDROCHLORIDE 0.2 MG: 0.2 INJECTION, SOLUTION INTRAMUSCULAR; INTRAVENOUS; SUBCUTANEOUS at 20:18

## 2023-01-01 RX ADMIN — IOPAMIDOL 136 ML: 755 INJECTION, SOLUTION INTRAVENOUS at 17:17

## 2023-01-01 RX ADMIN — METHYLPREDNISOLONE SODIUM SUCCINATE 40 MG: 40 INJECTION, POWDER, FOR SOLUTION INTRAMUSCULAR; INTRAVENOUS at 10:56

## 2023-01-01 RX ADMIN — HYDROMORPHONE HYDROCHLORIDE 0.4 MG: 0.2 INJECTION, SOLUTION INTRAMUSCULAR; INTRAVENOUS; SUBCUTANEOUS at 03:38

## 2023-01-01 RX ADMIN — SODIUM CHLORIDE 500 ML: 9 INJECTION, SOLUTION INTRAVENOUS at 00:45

## 2023-01-01 RX ADMIN — FLUTICASONE FUROATE AND VILANTEROL TRIFENATATE 1 PUFF: 200; 25 POWDER RESPIRATORY (INHALATION) at 11:19

## 2023-01-01 RX ADMIN — LORAZEPAM 2 MG: 2 INJECTION INTRAMUSCULAR; INTRAVENOUS at 04:10

## 2023-01-01 RX ADMIN — QUETIAPINE FUMARATE 100 MG: 25 TABLET ORAL at 10:56

## 2023-01-01 RX ADMIN — METOPROLOL TARTRATE 5 MG: 1 INJECTION, SOLUTION INTRAVENOUS at 21:20

## 2023-01-01 RX ADMIN — RISPERIDONE 5 MG: 1 TABLET ORAL at 21:21

## 2023-01-01 RX ADMIN — ONDANSETRON HYDROCHLORIDE 4 MG: 2 SOLUTION INTRAMUSCULAR; INTRAVENOUS at 16:21

## 2023-01-01 RX ADMIN — HYDROMORPHONE HYDROCHLORIDE 0.4 MG: 0.2 INJECTION, SOLUTION INTRAMUSCULAR; INTRAVENOUS; SUBCUTANEOUS at 11:59

## 2023-01-01 RX ADMIN — HYDROMORPHONE HYDROCHLORIDE 0.4 MG: 0.2 INJECTION, SOLUTION INTRAMUSCULAR; INTRAVENOUS; SUBCUTANEOUS at 01:42

## 2023-01-01 ASSESSMENT — ACTIVITIES OF DAILY LIVING (ADL)
ADLS_ACUITY_SCORE: 22
ADLS_ACUITY_SCORE: 24
ADLS_ACUITY_SCORE: 22
ADLS_ACUITY_SCORE: 24
ADLS_ACUITY_SCORE: 24
ADLS_ACUITY_SCORE: 22
ADLS_ACUITY_SCORE: 24
ADLS_ACUITY_SCORE: 24
ADLS_ACUITY_SCORE: 35
ADLS_ACUITY_SCORE: 24
ADLS_ACUITY_SCORE: 24
ADLS_ACUITY_SCORE: 22
ADLS_ACUITY_SCORE: 22
ADLS_ACUITY_SCORE: 24
ADLS_ACUITY_SCORE: 24
ADLS_ACUITY_SCORE: 35
ADLS_ACUITY_SCORE: 33
ADLS_ACUITY_SCORE: 22
ADLS_ACUITY_SCORE: 24

## 2023-01-01 ASSESSMENT — ENCOUNTER SYMPTOMS
VOMITING: 1
DYSURIA: 0
NAUSEA: 1
CHILLS: 0
CHEST TIGHTNESS: 0
BACK PAIN: 0

## 2023-04-01 ENCOUNTER — HOSPITAL ENCOUNTER (EMERGENCY)
Dept: HOSPITAL 60 - LB.ED | Age: 58
Discharge: HOME | End: 2023-04-01
Payer: MEDICARE

## 2023-04-01 VITALS — DIASTOLIC BLOOD PRESSURE: 77 MMHG | SYSTOLIC BLOOD PRESSURE: 149 MMHG | HEART RATE: 106 BPM

## 2023-04-01 DIAGNOSIS — Z79.899: ICD-10-CM

## 2023-04-01 DIAGNOSIS — K21.9: ICD-10-CM

## 2023-04-01 DIAGNOSIS — Z79.82: ICD-10-CM

## 2023-04-01 DIAGNOSIS — F17.210: ICD-10-CM

## 2023-04-01 DIAGNOSIS — Z88.8: ICD-10-CM

## 2023-04-01 DIAGNOSIS — Z88.6: ICD-10-CM

## 2023-04-01 DIAGNOSIS — Z79.4: ICD-10-CM

## 2023-04-01 DIAGNOSIS — I10: ICD-10-CM

## 2023-04-01 DIAGNOSIS — Z91.048: ICD-10-CM

## 2023-04-01 DIAGNOSIS — Z88.4: ICD-10-CM

## 2023-04-01 DIAGNOSIS — E66.9: ICD-10-CM

## 2023-04-01 DIAGNOSIS — J44.1: Primary | ICD-10-CM

## 2023-04-01 DIAGNOSIS — F41.9: ICD-10-CM

## 2023-04-01 DIAGNOSIS — E11.42: ICD-10-CM

## 2023-04-22 ENCOUNTER — HOSPITAL ENCOUNTER (EMERGENCY)
Dept: HOSPITAL 60 - LB.ED | Age: 58
Discharge: HOME | End: 2023-04-22
Payer: MEDICARE

## 2023-04-22 VITALS — SYSTOLIC BLOOD PRESSURE: 126 MMHG | DIASTOLIC BLOOD PRESSURE: 74 MMHG | HEART RATE: 98 BPM

## 2023-04-22 DIAGNOSIS — J44.9: ICD-10-CM

## 2023-04-22 DIAGNOSIS — Z79.899: ICD-10-CM

## 2023-04-22 DIAGNOSIS — E66.9: ICD-10-CM

## 2023-04-22 DIAGNOSIS — E11.9: ICD-10-CM

## 2023-04-22 DIAGNOSIS — Z88.8: ICD-10-CM

## 2023-04-22 DIAGNOSIS — Z88.4: ICD-10-CM

## 2023-04-22 DIAGNOSIS — Z91.048: ICD-10-CM

## 2023-04-22 DIAGNOSIS — K21.9: ICD-10-CM

## 2023-04-22 DIAGNOSIS — R07.81: Primary | ICD-10-CM

## 2023-04-22 DIAGNOSIS — Z79.82: ICD-10-CM

## 2023-04-22 RX ADMIN — NITROGLYCERIN PRN MG: 0.4 TABLET SUBLINGUAL at 10:37

## 2023-04-22 RX ADMIN — NITROGLYCERIN PRN MG: 0.4 TABLET SUBLINGUAL at 10:46

## 2023-05-07 ENCOUNTER — HOSPITAL ENCOUNTER (INPATIENT)
Dept: HOSPITAL 60 - LB.ED | Age: 58
Discharge: NURSING, REHAB OR LONG-TERM CARE | DRG: 389 | End: 2023-05-07
Attending: PHYSICIAN ASSISTANT | Admitting: PHYSICIAN ASSISTANT
Payer: MEDICARE

## 2023-05-07 VITALS — HEART RATE: 134 BPM

## 2023-05-07 VITALS — DIASTOLIC BLOOD PRESSURE: 61 MMHG | SYSTOLIC BLOOD PRESSURE: 114 MMHG

## 2023-05-07 DIAGNOSIS — Z79.82: ICD-10-CM

## 2023-05-07 DIAGNOSIS — E11.42: ICD-10-CM

## 2023-05-07 DIAGNOSIS — D50.9: ICD-10-CM

## 2023-05-07 DIAGNOSIS — F17.200: ICD-10-CM

## 2023-05-07 DIAGNOSIS — H54.7: ICD-10-CM

## 2023-05-07 DIAGNOSIS — J44.9: ICD-10-CM

## 2023-05-07 DIAGNOSIS — K21.9: ICD-10-CM

## 2023-05-07 DIAGNOSIS — Z88.8: ICD-10-CM

## 2023-05-07 DIAGNOSIS — E66.9: ICD-10-CM

## 2023-05-07 DIAGNOSIS — M54.9: ICD-10-CM

## 2023-05-07 DIAGNOSIS — N39.0: ICD-10-CM

## 2023-05-07 DIAGNOSIS — Z90.710: ICD-10-CM

## 2023-05-07 DIAGNOSIS — G89.29: ICD-10-CM

## 2023-05-07 DIAGNOSIS — Z79.899: ICD-10-CM

## 2023-05-07 DIAGNOSIS — Z88.1: ICD-10-CM

## 2023-05-07 DIAGNOSIS — Z87.01: ICD-10-CM

## 2023-05-07 DIAGNOSIS — K56.609: Primary | ICD-10-CM

## 2023-05-07 DIAGNOSIS — Z79.4: ICD-10-CM

## 2023-05-07 LAB
ALBUMIN SERPL-MCNC: 3.4 G/DL (ref 3.4–5)
ALBUMIN/GLOB SERPL: 1 {RATIO} (ref 0.8–2)
ALP SERPL-CCNC: 128 U/L (ref 46–116)
ALT SERPL-CCNC: 52 U/L (ref 12–78)
ANION GAP SERPL CALC-SCNC: 11.5 MMOL/L (ref 5–15)
APPEARANCE UR: (no result)
AST SERPL-CCNC: 26 U/L (ref 15–37)
BACTERIA URNS QL MICRO: (no result) /HPF
BASOPHILS # BLD AUTO: 0.1 K/UL (ref 0.02–0.1)
BASOPHILS NFR BLD AUTO: 0.7 % (ref 0–0.5)
BILIRUB SERPL-MCNC: 0.6 MG/DL (ref 0–1)
BILIRUB UR STRIP-MCNC: (no result) MG/DL
BUN SERPL-MCNC: 12 MG/DL (ref 8–26)
BUN/CREAT SERPL: 11.2 (ref 6–25)
CALCIUM SERPL-MCNC: 9.2 MG/DL (ref 8.5–10.1)
CHLORIDE SERPL-SCNC: 100 MMOL/L (ref 98–107)
CO2 SERPL-SCNC: 29.9 MMOL/L (ref 21–32)
COLOR UR: YELLOW
CREAT CL 24H UR+SERPL-VRATE: (no result) ML/MIN
CREAT SERPL-MCNC: 1.07 MG/DL (ref 0.55–1.02)
EOSINOPHIL # BLD AUTO: 0.02 K/UL (ref 0.04–0.4)
EOSINOPHIL NFR BLD AUTO: 0.1 % (ref 1–5)
ERYTHROCYTE [DISTWIDTH] IN BLOOD BY AUTOMATED COUNT: 15.1 % (ref 11–16)
GLOBULIN SER-MCNC: 3.5 G/DL (ref 2.2–4.2)
GLUCOSE SERPL-MCNC: 135 MG/DL (ref 74–100)
GLUCOSE UR STRIP-MCNC: NEGATIVE MG/DL
HCT VFR BLD AUTO: 49.4 % (ref 37–47)
HGB BLD-MCNC: 16 G/DL (ref 11.5–16.5)
KETONES UR STRIP-MCNC: 15 MG/DL
LIPASE SERPL-CCNC: 27 U/L (ref 73–393)
LYMPHOCYTES # BLD AUTO: 1.06 K/UL (ref 1.5–4)
LYMPHOCYTES NFR BLD AUTO: 7.9 % (ref 20–40)
MCH RBC QN AUTO: 30.7 PG (ref 27–32)
MCHC RBC AUTO-ENTMCNC: 32.4 G/DL (ref 31–35)
MCHC RBC AUTO-ENTMCNC: 95 FL (ref 76–96)
MONOCYTES # BLD AUTO: 0.83 K/UL (ref 0.2–0.8)
MONOCYTES NFR BLD AUTO: 6.2 % (ref 3–10)
NEUTROPHILS # BLD AUTO: 11.36 K/UL (ref 2–7.5)
NEUTROPHILS NFR BLD AUTO: 85.1 % (ref 45–70)
NITRITE UR QL: NEGATIVE
PH UR STRIP: 6 [PH] (ref 5–8)
PLATELET # BLD AUTO: 212 K/UL (ref 150–500)
PMV BLD AUTO: 11.4 FL (ref 6–10)
POTASSIUM SERPL-SCNC: 4.4 MMOL/L (ref 3.5–5.1)
PROT SERPL-MCNC: 6.9 G/DL (ref 6.4–8.2)
PROT UR STRIP-MCNC: 30 MG/DL
RBC # BLD AUTO: 5.21 M/UL (ref 3.8–5.8)
RBC # URNS HPF: (no result) /HPF
RBC UR QL: (no result)
SODIUM SERPL-SCNC: 137 MMOL/L (ref 136–145)
SP GR UR STRIP: 1.02 (ref 1–1.03)
SQUAMOUS #/AREA URNS HPF: (no result) /HPF
UROBILINOGEN UR STRIP-ACNC: 0.2 E.U./DL (ref 0.2–1)
WBC # BLD AUTO: 13.4 K/UL (ref 4–11)
WBC CLUMPS #/AREA URNS HPF: (no result) /HPF
WBC UR QL: (no result) /HPF

## 2023-05-07 PROCEDURE — 0D9670Z DRAINAGE OF STOMACH WITH DRAINAGE DEVICE, VIA NATURAL OR ARTIFICIAL OPENING: ICD-10-PCS | Performed by: PHYSICIAN ASSISTANT

## 2023-05-07 PROCEDURE — C9113 INJ PANTOPRAZOLE SODIUM, VIA: HCPCS

## 2023-05-07 RX ADMIN — ALOGLIPTIN SCH: 25 TABLET, FILM COATED ORAL at 15:47

## 2023-05-27 ENCOUNTER — HOSPITAL ENCOUNTER (EMERGENCY)
Dept: HOSPITAL 60 - LB.ED | Age: 58
Discharge: HOME | End: 2023-05-27
Payer: MEDICARE

## 2023-05-27 VITALS — DIASTOLIC BLOOD PRESSURE: 67 MMHG | SYSTOLIC BLOOD PRESSURE: 131 MMHG | HEART RATE: 114 BPM

## 2023-05-27 DIAGNOSIS — E66.9: ICD-10-CM

## 2023-05-27 DIAGNOSIS — E11.9: ICD-10-CM

## 2023-05-27 DIAGNOSIS — Z79.899: ICD-10-CM

## 2023-05-27 DIAGNOSIS — J44.1: Primary | ICD-10-CM

## 2023-05-27 DIAGNOSIS — K21.9: ICD-10-CM

## 2023-05-27 DIAGNOSIS — I10: ICD-10-CM

## 2023-05-27 LAB
ANION GAP SERPL CALC-SCNC: 10.9 MMOL/L (ref 5–15)
BUN SERPL-MCNC: 9 MG/DL (ref 8–26)
BUN/CREAT SERPL: 9.7 (ref 6–25)
CALCIUM SERPL-MCNC: 9.5 MG/DL (ref 8.5–10.1)
CHLORIDE SERPL-SCNC: 103 MMOL/L (ref 98–107)
CO2 SERPL-SCNC: 32.6 MMOL/L (ref 21–32)
CREAT CL 24H UR+SERPL-VRATE: (no result) ML/MIN
CREAT SERPL-MCNC: 0.93 MG/DL (ref 0.55–1.02)
GLUCOSE SERPL-MCNC: 65 MG/DL (ref 74–100)
POTASSIUM SERPL-SCNC: 3.5 MMOL/L (ref 3.5–5.1)
SODIUM SERPL-SCNC: 143 MMOL/L (ref 136–145)
TROPONIN I SERPL HS-MCNC: 23.6 PG/ML (ref ?–60.4)

## 2023-07-28 ENCOUNTER — HOSPITAL ENCOUNTER (EMERGENCY)
Dept: HOSPITAL 60 - LB.ED | Age: 58
Discharge: HOME | End: 2023-07-28
Payer: MEDICARE

## 2023-07-28 VITALS — DIASTOLIC BLOOD PRESSURE: 84 MMHG | SYSTOLIC BLOOD PRESSURE: 145 MMHG | HEART RATE: 83 BPM

## 2023-07-28 DIAGNOSIS — Z79.4: ICD-10-CM

## 2023-07-28 DIAGNOSIS — R07.9: Primary | ICD-10-CM

## 2023-07-28 DIAGNOSIS — Z88.4: ICD-10-CM

## 2023-07-28 DIAGNOSIS — E78.00: ICD-10-CM

## 2023-07-28 DIAGNOSIS — Z88.1: ICD-10-CM

## 2023-07-28 DIAGNOSIS — Z79.899: ICD-10-CM

## 2023-07-28 DIAGNOSIS — Z88.8: ICD-10-CM

## 2023-07-28 DIAGNOSIS — E11.9: ICD-10-CM

## 2023-07-28 DIAGNOSIS — J43.9: ICD-10-CM

## 2023-07-28 DIAGNOSIS — R06.02: ICD-10-CM

## 2023-07-28 DIAGNOSIS — Z91.048: ICD-10-CM

## 2023-07-28 DIAGNOSIS — Z79.82: ICD-10-CM

## 2023-07-28 DIAGNOSIS — I10: ICD-10-CM

## 2023-07-28 DIAGNOSIS — E66.9: ICD-10-CM

## 2023-07-28 LAB
ALBUMIN SERPL-MCNC: 3.6 G/DL (ref 3.4–5)
ALBUMIN/GLOB SERPL: 1 {RATIO} (ref 0.8–2)
ALP SERPL-CCNC: 77 U/L (ref 46–116)
ALT SERPL-CCNC: 25 U/L (ref 12–78)
ANION GAP SERPL CALC-SCNC: 16.3 MMOL/L (ref 5–15)
AST SERPL-CCNC: 12 U/L (ref 15–37)
BASOPHILS # BLD AUTO: 0.12 K/UL (ref 0.02–0.1)
BASOPHILS NFR BLD AUTO: 1 % (ref 0–0.5)
BILIRUB SERPL-MCNC: 0.4 MG/DL (ref 0–1)
BUN SERPL-MCNC: 14 MG/DL (ref 8–26)
BUN/CREAT SERPL: 12.5 (ref 6–25)
CALCIUM SERPL-MCNC: 9.3 MG/DL (ref 8.5–10.1)
CHLORIDE SERPL-SCNC: 97 MMOL/L (ref 98–107)
CO2 SERPL-SCNC: 28.1 MMOL/L (ref 21–32)
CREAT CL 24H UR+SERPL-VRATE: 47.28 ML/MIN
CREAT SERPL-MCNC: 1.12 MG/DL (ref 0.55–1.02)
EOSINOPHIL # BLD AUTO: 0.03 K/UL (ref 0.04–0.4)
EOSINOPHIL NFR BLD AUTO: 0.2 % (ref 1–5)
ERYTHROCYTE [DISTWIDTH] IN BLOOD BY AUTOMATED COUNT: 15.8 % (ref 11–16)
GLOBULIN SER-MCNC: 3.5 G/DL (ref 2.2–4.2)
GLUCOSE SERPL-MCNC: 165 MG/DL (ref 74–100)
HCT VFR BLD AUTO: 47.6 % (ref 37–47)
HGB BLD-MCNC: 15.3 G/DL (ref 11.5–16.5)
INR PPP: 1.1 (ref 1–3.5)
LYMPHOCYTES # BLD AUTO: 0.79 K/UL (ref 1.5–4)
LYMPHOCYTES NFR BLD AUTO: 6.5 % (ref 20–40)
MCH RBC QN AUTO: 31.2 PG (ref 27–32)
MCHC RBC AUTO-ENTMCNC: 32.1 G/DL (ref 31–35)
MCHC RBC AUTO-ENTMCNC: 97 FL (ref 76–96)
MONOCYTES # BLD AUTO: 0.78 K/UL (ref 0.2–0.8)
MONOCYTES NFR BLD AUTO: 6.4 % (ref 3–10)
NEUTROPHILS # BLD AUTO: 10.52 K/UL (ref 2–7.5)
NEUTROPHILS NFR BLD AUTO: 85.9 % (ref 45–70)
PLATELET # BLD AUTO: 164 K/UL (ref 150–500)
PMV BLD AUTO: 11.2 FL (ref 6–10)
POTASSIUM SERPL-SCNC: 3.4 MMOL/L (ref 3.5–5.1)
PROT SERPL-MCNC: 7.1 G/DL (ref 6.4–8.2)
PROTHROMBIN TIME: 11 SEC (ref 9–11.5)
RBC # BLD AUTO: 4.91 M/UL (ref 3.8–5.8)
SODIUM SERPL-SCNC: 138 MMOL/L (ref 136–145)
WBC # BLD AUTO: 12.2 K/UL (ref 4–11)

## 2023-07-28 PROCEDURE — 85730 THROMBOPLASTIN TIME PARTIAL: CPT

## 2023-07-28 PROCEDURE — 93005 ELECTROCARDIOGRAM TRACING: CPT

## 2023-07-28 PROCEDURE — 96374 THER/PROPH/DIAG INJ IV PUSH: CPT

## 2023-07-28 PROCEDURE — 85379 FIBRIN DEGRADATION QUANT: CPT

## 2023-07-28 PROCEDURE — 99285 EMERGENCY DEPT VISIT HI MDM: CPT

## 2023-07-28 PROCEDURE — 36415 COLL VENOUS BLD VENIPUNCTURE: CPT

## 2023-07-28 PROCEDURE — 96375 TX/PRO/DX INJ NEW DRUG ADDON: CPT

## 2023-07-28 PROCEDURE — 71045 X-RAY EXAM CHEST 1 VIEW: CPT

## 2023-07-28 PROCEDURE — 85025 COMPLETE CBC W/AUTO DIFF WBC: CPT

## 2023-07-28 PROCEDURE — 80053 COMPREHEN METABOLIC PANEL: CPT

## 2023-07-28 PROCEDURE — 84484 ASSAY OF TROPONIN QUANT: CPT

## 2023-07-28 PROCEDURE — 85610 PROTHROMBIN TIME: CPT

## 2023-08-18 ENCOUNTER — HOSPITAL ENCOUNTER (EMERGENCY)
Dept: HOSPITAL 60 - LB.ED | Age: 58
Discharge: HOME | End: 2023-08-18
Payer: MEDICARE

## 2023-08-18 VITALS — SYSTOLIC BLOOD PRESSURE: 121 MMHG | DIASTOLIC BLOOD PRESSURE: 76 MMHG

## 2023-08-18 VITALS — HEART RATE: 108 BPM

## 2023-08-18 DIAGNOSIS — Z91.048: ICD-10-CM

## 2023-08-18 DIAGNOSIS — J44.1: Primary | ICD-10-CM

## 2023-08-18 DIAGNOSIS — Z79.899: ICD-10-CM

## 2023-08-18 DIAGNOSIS — E11.9: ICD-10-CM

## 2023-08-18 DIAGNOSIS — Z79.82: ICD-10-CM

## 2023-08-18 DIAGNOSIS — K21.9: ICD-10-CM

## 2023-08-18 DIAGNOSIS — I10: ICD-10-CM

## 2023-08-18 DIAGNOSIS — Z88.8: ICD-10-CM

## 2023-08-18 DIAGNOSIS — Z79.4: ICD-10-CM

## 2023-08-18 DIAGNOSIS — E66.9: ICD-10-CM

## 2023-08-18 DIAGNOSIS — E78.00: ICD-10-CM

## 2023-08-18 LAB
ALBUMIN SERPL-MCNC: 3.5 G/DL (ref 3.4–5)
ALBUMIN/GLOB SERPL: 0.9 {RATIO} (ref 0.8–2)
ALP SERPL-CCNC: 124 U/L (ref 46–116)
ALT SERPL-CCNC: 35 U/L (ref 12–78)
ANION GAP SERPL CALC-SCNC: 12.6 MMOL/L (ref 5–15)
APTT PPP: 25.2 SECONDS (ref 24.4–33.2)
AST SERPL-CCNC: 19 U/L (ref 15–37)
BASOPHILS # BLD AUTO: 0.08 K/UL (ref 0.02–0.1)
BASOPHILS NFR BLD AUTO: 0.6 % (ref 0–0.5)
BILIRUB SERPL-MCNC: 0.4 MG/DL (ref 0–1)
BUN SERPL-MCNC: 10 MG/DL (ref 8–26)
BUN/CREAT SERPL: 8.5 (ref 6–25)
CALCIUM SERPL-MCNC: 9.1 MG/DL (ref 8.5–10.1)
CHLORIDE SERPL-SCNC: 97 MMOL/L (ref 98–107)
CO2 SERPL-SCNC: 31.8 MMOL/L (ref 21–32)
CREAT CL 24H UR+SERPL-VRATE: 45.26 ML/MIN
CREAT SERPL-MCNC: 1.17 MG/DL (ref 0.55–1.02)
EOSINOPHIL # BLD AUTO: 0.03 K/UL (ref 0.04–0.4)
EOSINOPHIL NFR BLD AUTO: 0.2 % (ref 1–5)
ERYTHROCYTE [DISTWIDTH] IN BLOOD BY AUTOMATED COUNT: 15.2 % (ref 11–16)
GLOBULIN SER-MCNC: 3.9 G/DL (ref 2.2–4.2)
GLUCOSE SERPL-MCNC: 205 MG/DL (ref 74–100)
HCT VFR BLD AUTO: 44.6 % (ref 37–47)
HGB BLD-MCNC: 14.8 G/DL (ref 11.5–16.5)
INR PPP: 1 (ref 1–3.5)
LYMPHOCYTES # BLD AUTO: 1.01 K/UL (ref 1.5–4)
LYMPHOCYTES NFR BLD AUTO: 7.9 % (ref 20–40)
MCH RBC QN AUTO: 32.1 PG (ref 27–32)
MCHC RBC AUTO-ENTMCNC: 33.2 G/DL (ref 31–35)
MCHC RBC AUTO-ENTMCNC: 97 FL (ref 76–96)
MONOCYTES # BLD AUTO: 0.9 K/UL (ref 0.2–0.8)
MONOCYTES NFR BLD AUTO: 7.1 % (ref 3–10)
NEUTROPHILS # BLD AUTO: 10.69 K/UL (ref 2–7.5)
NEUTROPHILS NFR BLD AUTO: 84.2 % (ref 45–70)
PLATELET # BLD AUTO: 145 K/UL (ref 150–500)
PMV BLD AUTO: 11.5 FL (ref 6–10)
POTASSIUM SERPL-SCNC: 3.4 MMOL/L (ref 3.5–5.1)
PROT SERPL-MCNC: 7.4 G/DL (ref 6.4–8.2)
PROTHROMBIN TIME: 10.6 SEC (ref 9–11.5)
RBC # BLD AUTO: 4.61 M/UL (ref 3.8–5.8)
SODIUM SERPL-SCNC: 138 MMOL/L (ref 136–145)
TROPONIN I SERPL HS-MCNC: 42.3 PG/ML (ref ?–60.4)
WBC # BLD AUTO: 12.7 K/UL (ref 4–11)

## 2023-08-18 RX ADMIN — ALBUTEROL SULFATE ONE: 2.5 SOLUTION RESPIRATORY (INHALATION) at 16:12

## 2023-08-18 RX ADMIN — ALBUTEROL SULFATE ONE MG: 2.5 SOLUTION RESPIRATORY (INHALATION) at 16:11

## 2023-08-18 RX ADMIN — MAGNESIUM SULFATE IN WATER ONE MLS/HR: 40 INJECTION, SOLUTION INTRAVENOUS at 16:33

## 2023-08-18 RX ADMIN — NITROGLYCERIN ONE MG: 0.4 TABLET SUBLINGUAL at 16:07

## 2023-08-18 RX ADMIN — MAGNESIUM SULFATE IN WATER ONE: 40 INJECTION, SOLUTION INTRAVENOUS at 18:33

## 2023-08-18 RX ADMIN — POTASSIUM CHLORIDE ONE MEQ: 1500 TABLET, EXTENDED RELEASE ORAL at 17:43

## 2023-09-01 ENCOUNTER — HOSPITAL ENCOUNTER (EMERGENCY)
Dept: HOSPITAL 60 - LB.ED | Age: 58
Discharge: HOME | End: 2023-09-01
Payer: MEDICARE

## 2023-09-01 VITALS — DIASTOLIC BLOOD PRESSURE: 78 MMHG | SYSTOLIC BLOOD PRESSURE: 125 MMHG | HEART RATE: 102 BPM

## 2023-09-01 DIAGNOSIS — K52.9: Primary | ICD-10-CM

## 2023-09-01 DIAGNOSIS — J43.9: ICD-10-CM

## 2023-09-01 DIAGNOSIS — F17.210: ICD-10-CM

## 2023-09-01 DIAGNOSIS — I10: ICD-10-CM

## 2023-09-01 DIAGNOSIS — E78.00: ICD-10-CM

## 2023-09-01 DIAGNOSIS — Z79.4: ICD-10-CM

## 2023-09-01 DIAGNOSIS — E66.9: ICD-10-CM

## 2023-09-01 DIAGNOSIS — Z88.8: ICD-10-CM

## 2023-09-01 DIAGNOSIS — Z91.048: ICD-10-CM

## 2023-09-01 DIAGNOSIS — Z79.899: ICD-10-CM

## 2023-09-01 DIAGNOSIS — E11.9: ICD-10-CM

## 2023-09-01 LAB
ALBUMIN SERPL-MCNC: 3.7 G/DL (ref 3.4–5)
ALBUMIN/GLOB SERPL: 1.1 {RATIO} (ref 0.8–2)
ALP SERPL-CCNC: 96 U/L (ref 46–116)
ALT SERPL-CCNC: 61 U/L (ref 12–78)
ANION GAP SERPL CALC-SCNC: 15.6 MMOL/L (ref 5–15)
APPEARANCE UR: CLEAR
AST SERPL-CCNC: 27 U/L (ref 15–37)
BASOPHILS # BLD AUTO: 0.03 K/UL (ref 0.02–0.1)
BASOPHILS NFR BLD AUTO: 0.2 % (ref 0–0.5)
BILIRUB SERPL-MCNC: 0.9 MG/DL (ref 0–1)
BILIRUB UR STRIP-MCNC: (no result) MG/DL
BUN SERPL-MCNC: 14 MG/DL (ref 8–26)
BUN/CREAT SERPL: 10.9 (ref 6–25)
CALCIUM SERPL-MCNC: 8 MG/DL (ref 8.5–10.1)
CHLORIDE SERPL-SCNC: 94 MMOL/L (ref 98–107)
CO2 SERPL-SCNC: 26.2 MMOL/L (ref 21–32)
COLOR UR: YELLOW
CREAT CL 24H UR+SERPL-VRATE: 41.05 ML/MIN
CREAT SERPL-MCNC: 1.29 MG/DL (ref 0.55–1.02)
EOSINOPHIL # BLD AUTO: 0.05 K/UL (ref 0.04–0.4)
EOSINOPHIL NFR BLD AUTO: 0.4 % (ref 1–5)
ERYTHROCYTE [DISTWIDTH] IN BLOOD BY AUTOMATED COUNT: 15.1 % (ref 11–16)
GLOBULIN SER-MCNC: 3.5 G/DL (ref 2.2–4.2)
GLUCOSE SERPL-MCNC: 125 MG/DL (ref 74–100)
GLUCOSE UR STRIP-MCNC: NEGATIVE MG/DL
HCT VFR BLD AUTO: 51.3 % (ref 37–47)
HGB BLD-MCNC: 16.8 G/DL (ref 11.5–16.5)
KETONES UR STRIP-MCNC: (no result) MG/DL
LYMPHOCYTES # BLD AUTO: 0.74 K/UL (ref 1.5–4)
LYMPHOCYTES NFR BLD AUTO: 5.8 % (ref 20–40)
MCH RBC QN AUTO: 31.4 PG (ref 27–32)
MCHC RBC AUTO-ENTMCNC: 32.7 G/DL (ref 31–35)
MCHC RBC AUTO-ENTMCNC: 96 FL (ref 76–96)
MONOCYTES # BLD AUTO: 1.17 K/UL (ref 0.2–0.8)
MONOCYTES NFR BLD AUTO: 9.2 % (ref 3–10)
NEUTROPHILS # BLD AUTO: 10.69 K/UL (ref 2–7.5)
NEUTROPHILS NFR BLD AUTO: 84.4 % (ref 45–70)
NITRITE UR QL: NEGATIVE
PH UR STRIP: 6.5 [PH] (ref 5–8)
PLATELET # BLD AUTO: 221 K/UL (ref 150–500)
PMV BLD AUTO: 10.3 FL (ref 6–10)
POTASSIUM SERPL-SCNC: 3.8 MMOL/L (ref 3.5–5.1)
PROT SERPL-MCNC: 7.2 G/DL (ref 6.4–8.2)
PROT UR STRIP-MCNC: 30 MG/DL
RBC # BLD AUTO: 5.35 M/UL (ref 3.8–5.8)
RBC # URNS HPF: (no result) /HPF
RBC UR QL: (no result)
SODIUM SERPL-SCNC: 132 MMOL/L (ref 136–145)
SP GR UR STRIP: 1.01 (ref 1–1.03)
SQUAMOUS #/AREA URNS HPF: (no result) /HPF
UROBILINOGEN UR STRIP-ACNC: 1 E.U./DL (ref 0.2–1)
WBC # BLD AUTO: 12.7 K/UL (ref 4–11)
WBC UR QL: (no result) /HPF

## 2023-09-01 RX ADMIN — PROCHLORPERAZINE EDISYLATE ONE: 5 INJECTION INTRAMUSCULAR; INTRAVENOUS at 10:57

## 2023-09-01 RX ADMIN — PROCHLORPERAZINE EDISYLATE ONE MG: 5 INJECTION INTRAMUSCULAR; INTRAVENOUS at 10:54

## 2023-09-01 RX ADMIN — ONDANSETRON ONE MG: 2 INJECTION, SOLUTION INTRAMUSCULAR; INTRAVENOUS at 08:58

## 2023-09-02 ENCOUNTER — HOSPITAL ENCOUNTER (INPATIENT)
Dept: HOSPITAL 60 - LB.ED | Age: 58
LOS: 4 days | Discharge: NURSING, REHAB OR LONG-TERM CARE | DRG: 389 | End: 2023-09-06
Attending: NURSE PRACTITIONER | Admitting: SURGERY
Payer: MEDICARE

## 2023-09-02 ENCOUNTER — HOSPITAL ENCOUNTER (EMERGENCY)
Dept: HOSPITAL 60 - LB.ED | Age: 58
Discharge: HOME | End: 2023-09-02
Payer: MEDICARE

## 2023-09-02 VITALS — SYSTOLIC BLOOD PRESSURE: 135 MMHG | DIASTOLIC BLOOD PRESSURE: 77 MMHG | HEART RATE: 116 BPM

## 2023-09-02 DIAGNOSIS — E78.00: ICD-10-CM

## 2023-09-02 DIAGNOSIS — Z88.1: ICD-10-CM

## 2023-09-02 DIAGNOSIS — Z79.899: ICD-10-CM

## 2023-09-02 DIAGNOSIS — F32.A: ICD-10-CM

## 2023-09-02 DIAGNOSIS — K52.9: ICD-10-CM

## 2023-09-02 DIAGNOSIS — J44.9: ICD-10-CM

## 2023-09-02 DIAGNOSIS — F17.210: ICD-10-CM

## 2023-09-02 DIAGNOSIS — Z90.710: ICD-10-CM

## 2023-09-02 DIAGNOSIS — D50.9: ICD-10-CM

## 2023-09-02 DIAGNOSIS — F17.200: ICD-10-CM

## 2023-09-02 DIAGNOSIS — E11.9: ICD-10-CM

## 2023-09-02 DIAGNOSIS — Z88.0: ICD-10-CM

## 2023-09-02 DIAGNOSIS — K52.9: Primary | ICD-10-CM

## 2023-09-02 DIAGNOSIS — G43.909: ICD-10-CM

## 2023-09-02 DIAGNOSIS — Z91.048: ICD-10-CM

## 2023-09-02 DIAGNOSIS — J43.9: ICD-10-CM

## 2023-09-02 DIAGNOSIS — I48.91: ICD-10-CM

## 2023-09-02 DIAGNOSIS — Z88.8: ICD-10-CM

## 2023-09-02 DIAGNOSIS — Z79.82: ICD-10-CM

## 2023-09-02 DIAGNOSIS — E66.9: ICD-10-CM

## 2023-09-02 DIAGNOSIS — Z79.84: ICD-10-CM

## 2023-09-02 DIAGNOSIS — K56.609: Primary | ICD-10-CM

## 2023-09-02 DIAGNOSIS — Z79.4: ICD-10-CM

## 2023-09-02 DIAGNOSIS — E87.6: ICD-10-CM

## 2023-09-02 DIAGNOSIS — Z98.890: ICD-10-CM

## 2023-09-02 DIAGNOSIS — M19.90: ICD-10-CM

## 2023-09-02 DIAGNOSIS — Z87.81: ICD-10-CM

## 2023-09-02 DIAGNOSIS — I10: ICD-10-CM

## 2023-09-02 DIAGNOSIS — F41.9: ICD-10-CM

## 2023-09-02 DIAGNOSIS — Z90.49: ICD-10-CM

## 2023-09-02 DIAGNOSIS — K59.09: ICD-10-CM

## 2023-09-02 LAB
ALBUMIN SERPL-MCNC: 3.6 G/DL (ref 3.4–5)
ALBUMIN/GLOB SERPL: 1 {RATIO} (ref 0.8–2)
ALP SERPL-CCNC: 84 U/L (ref 46–116)
ALT SERPL-CCNC: 44 U/L (ref 12–78)
ANION GAP SERPL CALC-SCNC: 12.9 MMOL/L (ref 5–15)
ANION GAP SERPL CALC-SCNC: 15.8 MMOL/L (ref 5–15)
ANION GAP SERPL CALC-SCNC: 17.3 MMOL/L (ref 5–15)
AST SERPL-CCNC: 23 U/L (ref 15–37)
BASOPHILS # BLD AUTO: 0.02 K/UL (ref 0.02–0.1)
BASOPHILS NFR BLD AUTO: 0.2 % (ref 0–0.5)
BILIRUB SERPL-MCNC: 0.9 MG/DL (ref 0–1)
BUN SERPL-MCNC: 11 MG/DL (ref 8–26)
BUN SERPL-MCNC: 11 MG/DL (ref 8–26)
BUN SERPL-MCNC: 13 MG/DL (ref 8–26)
BUN/CREAT SERPL: 11.9 (ref 6–25)
BUN/CREAT SERPL: 8.7 (ref 6–25)
BUN/CREAT SERPL: 9.6 (ref 6–25)
CALCIUM SERPL-MCNC: 8.3 MG/DL (ref 8.5–10.1)
CALCIUM SERPL-MCNC: 8.7 MG/DL (ref 8.5–10.1)
CALCIUM SERPL-MCNC: 9.3 MG/DL (ref 8.5–10.1)
CHLORIDE SERPL-SCNC: 93 MMOL/L (ref 98–107)
CHLORIDE SERPL-SCNC: 95 MMOL/L (ref 98–107)
CHLORIDE SERPL-SCNC: 95 MMOL/L (ref 98–107)
CO2 SERPL-SCNC: 22.8 MMOL/L (ref 21–32)
CO2 SERPL-SCNC: 23.9 MMOL/L (ref 21–32)
CO2 SERPL-SCNC: 28.2 MMOL/L (ref 21–32)
CREAT CL 24H UR+SERPL-VRATE: 42.03 ML/MIN
CREAT CL 24H UR+SERPL-VRATE: 46.05 ML/MIN
CREAT CL 24H UR+SERPL-VRATE: 48.58 ML/MIN
CREAT SERPL-MCNC: 1.09 MG/DL (ref 0.55–1.02)
CREAT SERPL-MCNC: 1.15 MG/DL (ref 0.55–1.02)
CREAT SERPL-MCNC: 1.26 MG/DL (ref 0.55–1.02)
EOSINOPHIL # BLD AUTO: 0.07 K/UL (ref 0.04–0.4)
EOSINOPHIL NFR BLD AUTO: 0.7 % (ref 1–5)
ERYTHROCYTE [DISTWIDTH] IN BLOOD BY AUTOMATED COUNT: 14.9 % (ref 11–16)
ERYTHROCYTE [DISTWIDTH] IN BLOOD BY AUTOMATED COUNT: 14.9 % (ref 11–16)
GLOBULIN SER-MCNC: 3.6 G/DL (ref 2.2–4.2)
GLUCOSE SERPL-MCNC: 120 MG/DL (ref 74–100)
GLUCOSE SERPL-MCNC: 127 MG/DL (ref 74–100)
GLUCOSE SERPL-MCNC: 143 MG/DL (ref 74–100)
HCT VFR BLD AUTO: 48.8 % (ref 37–47)
HCT VFR BLD AUTO: 50.9 % (ref 37–47)
HGB BLD-MCNC: 16.2 G/DL (ref 11.5–16.5)
HGB BLD-MCNC: 16.9 G/DL (ref 11.5–16.5)
LACTATE SERPL-SCNC: 1.8 MMOL/L (ref 0.4–2)
LYMPHOCYTES # BLD AUTO: 0.61 K/UL (ref 1.5–4)
LYMPHOCYTES NFR BLD AUTO: 6.5 % (ref 20–40)
MAGNESIUM SERPL-MCNC: 1.7 MG/DL (ref 1.8–2.4)
MCH RBC QN AUTO: 31.2 PG (ref 27–32)
MCH RBC QN AUTO: 31.3 PG (ref 27–32)
MCHC RBC AUTO-ENTMCNC: 33.2 G/DL (ref 31–35)
MCHC RBC AUTO-ENTMCNC: 33.2 G/DL (ref 31–35)
MCHC RBC AUTO-ENTMCNC: 94 FL (ref 76–96)
MCHC RBC AUTO-ENTMCNC: 94 FL (ref 76–96)
MONOCYTES # BLD AUTO: 1.1 K/UL (ref 0.2–0.8)
MONOCYTES NFR BLD AUTO: 11.7 % (ref 3–10)
NEUTROPHILS # BLD AUTO: 7.63 K/UL (ref 2–7.5)
NEUTROPHILS NFR BLD AUTO: 80.9 % (ref 45–70)
PHOSPHATE SERPL-MCNC: 3.7 MG/DL (ref 2.5–4.9)
PLATELET # BLD AUTO: 231 K/UL (ref 150–500)
PLATELET # BLD AUTO: 238 K/UL (ref 150–500)
PMV BLD AUTO: 10.9 FL (ref 6–10)
PMV BLD AUTO: 11.1 FL (ref 6–10)
POTASSIUM SERPL-SCNC: 3.7 MMOL/L (ref 3.5–5.1)
POTASSIUM SERPL-SCNC: 4.1 MMOL/L (ref 3.5–5.1)
POTASSIUM SERPL-SCNC: 4.1 MMOL/L (ref 3.5–5.1)
PROT SERPL-MCNC: 7.2 G/DL (ref 6.4–8.2)
RBC # BLD AUTO: 5.18 M/UL (ref 3.8–5.8)
RBC # BLD AUTO: 5.41 M/UL (ref 3.8–5.8)
SODIUM SERPL-SCNC: 129 MMOL/L (ref 136–145)
SODIUM SERPL-SCNC: 131 MMOL/L (ref 136–145)
SODIUM SERPL-SCNC: 132 MMOL/L (ref 136–145)
WBC # BLD AUTO: 8.9 K/UL (ref 4–11)
WBC # BLD AUTO: 9.4 K/UL (ref 4–11)

## 2023-09-02 PROCEDURE — 96375 TX/PRO/DX INJ NEW DRUG ADDON: CPT

## 2023-09-02 PROCEDURE — 99284 EMERGENCY DEPT VISIT MOD MDM: CPT

## 2023-09-02 PROCEDURE — 80048 BASIC METABOLIC PNL TOTAL CA: CPT

## 2023-09-02 PROCEDURE — 71045 X-RAY EXAM CHEST 1 VIEW: CPT

## 2023-09-02 PROCEDURE — 96361 HYDRATE IV INFUSION ADD-ON: CPT

## 2023-09-02 PROCEDURE — 83735 ASSAY OF MAGNESIUM: CPT

## 2023-09-02 PROCEDURE — 74176 CT ABD & PELVIS W/O CONTRAST: CPT

## 2023-09-02 PROCEDURE — 0D9670Z DRAINAGE OF STOMACH WITH DRAINAGE DEVICE, VIA NATURAL OR ARTIFICIAL OPENING: ICD-10-PCS | Performed by: NURSE PRACTITIONER

## 2023-09-02 PROCEDURE — 80053 COMPREHEN METABOLIC PANEL: CPT

## 2023-09-02 PROCEDURE — 83605 ASSAY OF LACTIC ACID: CPT

## 2023-09-02 PROCEDURE — 99285 EMERGENCY DEPT VISIT HI MDM: CPT

## 2023-09-02 PROCEDURE — 36415 COLL VENOUS BLD VENIPUNCTURE: CPT

## 2023-09-02 PROCEDURE — 85027 COMPLETE CBC AUTOMATED: CPT

## 2023-09-02 PROCEDURE — 74018 RADEX ABDOMEN 1 VIEW: CPT

## 2023-09-02 PROCEDURE — 96374 THER/PROPH/DIAG INJ IV PUSH: CPT

## 2023-09-02 PROCEDURE — 84100 ASSAY OF PHOSPHORUS: CPT

## 2023-09-02 PROCEDURE — 85025 COMPLETE CBC W/AUTO DIFF WBC: CPT

## 2023-09-02 PROCEDURE — 43752 NASAL/OROGASTRIC W/TUBE PLMT: CPT

## 2023-09-02 PROCEDURE — 96365 THER/PROPH/DIAG IV INF INIT: CPT

## 2023-09-02 RX ADMIN — INSULIN GLARGINE SCH UNITS: 100 INJECTION, SOLUTION SUBCUTANEOUS at 20:42

## 2023-09-02 RX ADMIN — HEPARIN SODIUM SCH UNITS: 5000 INJECTION, SOLUTION INTRAVENOUS; SUBCUTANEOUS at 20:40

## 2023-09-02 RX ADMIN — Medication PRN ML: at 21:06

## 2023-09-03 RX ADMIN — HEPARIN SODIUM SCH UNITS: 5000 INJECTION, SOLUTION INTRAVENOUS; SUBCUTANEOUS at 11:25

## 2023-09-03 RX ADMIN — Medication PRN ML: at 19:50

## 2023-09-03 RX ADMIN — MOMETASONE FUROATE SCH: 220 INHALANT RESPIRATORY (INHALATION) at 20:07

## 2023-09-03 RX ADMIN — HEPARIN SODIUM SCH UNITS: 5000 INJECTION, SOLUTION INTRAVENOUS; SUBCUTANEOUS at 03:30

## 2023-09-03 RX ADMIN — ONDANSETRON PRN MG: 2 INJECTION, SOLUTION INTRAMUSCULAR; INTRAVENOUS at 00:52

## 2023-09-03 RX ADMIN — MOMETASONE FUROATE SCH: 220 INHALANT RESPIRATORY (INHALATION) at 08:01

## 2023-09-03 RX ADMIN — HEPARIN SODIUM SCH UNITS: 5000 INJECTION, SOLUTION INTRAVENOUS; SUBCUTANEOUS at 19:48

## 2023-09-03 RX ADMIN — INSULIN GLARGINE SCH: 100 INJECTION, SOLUTION SUBCUTANEOUS at 20:08

## 2023-09-04 LAB
ANION GAP SERPL CALC-SCNC: 8.3 MMOL/L (ref 5–15)
BUN SERPL-MCNC: 11 MG/DL (ref 8–26)
BUN/CREAT SERPL: 11.8 (ref 6–25)
CALCIUM SERPL-MCNC: 8.6 MG/DL (ref 8.5–10.1)
CHLORIDE SERPL-SCNC: 97 MMOL/L (ref 98–107)
CO2 SERPL-SCNC: 31.9 MMOL/L (ref 21–32)
CREAT CL 24H UR+SERPL-VRATE: 56.94 ML/MIN
CREAT SERPL-MCNC: 0.93 MG/DL (ref 0.55–1.02)
ERYTHROCYTE [DISTWIDTH] IN BLOOD BY AUTOMATED COUNT: 14.9 % (ref 11–16)
GLUCOSE SERPL-MCNC: 134 MG/DL (ref 74–100)
HCT VFR BLD AUTO: 47 % (ref 37–47)
HGB BLD-MCNC: 15.3 G/DL (ref 11.5–16.5)
MCH RBC QN AUTO: 31.2 PG (ref 27–32)
MCHC RBC AUTO-ENTMCNC: 32.6 G/DL (ref 31–35)
MCHC RBC AUTO-ENTMCNC: 96 FL (ref 76–96)
PLATELET # BLD AUTO: 180 K/UL (ref 150–500)
PMV BLD AUTO: 10.6 FL (ref 6–10)
POTASSIUM SERPL-SCNC: 3.2 MMOL/L (ref 3.5–5.1)
RBC # BLD AUTO: 4.9 M/UL (ref 3.8–5.8)
SODIUM SERPL-SCNC: 134 MMOL/L (ref 136–145)
WBC # BLD AUTO: 7.3 K/UL (ref 4–11)

## 2023-09-04 RX ADMIN — HEPARIN SODIUM SCH UNITS: 5000 INJECTION, SOLUTION INTRAVENOUS; SUBCUTANEOUS at 04:29

## 2023-09-04 RX ADMIN — HEPARIN SODIUM SCH UNITS: 5000 INJECTION, SOLUTION INTRAVENOUS; SUBCUTANEOUS at 19:39

## 2023-09-04 RX ADMIN — INSULIN GLARGINE SCH UNITS: 100 INJECTION, SOLUTION SUBCUTANEOUS at 19:44

## 2023-09-04 RX ADMIN — DEXTROSE, SODIUM CHLORIDE, AND POTASSIUM CHLORIDE SCH MLS/HR: 5; .9; .15 INJECTION INTRAVENOUS at 17:54

## 2023-09-04 RX ADMIN — MOMETASONE FUROATE SCH: 220 INHALANT RESPIRATORY (INHALATION) at 09:22

## 2023-09-04 RX ADMIN — DEXTROSE, SODIUM CHLORIDE, AND POTASSIUM CHLORIDE SCH MLS/HR: 5; .9; .15 INJECTION INTRAVENOUS at 01:35

## 2023-09-04 RX ADMIN — HEPARIN SODIUM SCH UNITS: 5000 INJECTION, SOLUTION INTRAVENOUS; SUBCUTANEOUS at 12:54

## 2023-09-04 RX ADMIN — MOMETASONE FUROATE SCH: 220 INHALANT RESPIRATORY (INHALATION) at 19:22

## 2023-09-05 RX ADMIN — MOMETASONE FUROATE SCH: 220 INHALANT RESPIRATORY (INHALATION) at 08:11

## 2023-09-05 RX ADMIN — HEPARIN SODIUM SCH UNITS: 5000 INJECTION, SOLUTION INTRAVENOUS; SUBCUTANEOUS at 03:24

## 2023-09-05 RX ADMIN — MOMETASONE FUROATE SCH: 220 INHALANT RESPIRATORY (INHALATION) at 19:39

## 2023-09-05 RX ADMIN — DEXTROSE, SODIUM CHLORIDE, AND POTASSIUM CHLORIDE SCH MLS/HR: 5; .9; .15 INJECTION INTRAVENOUS at 03:11

## 2023-09-05 RX ADMIN — ONDANSETRON PRN MG: 2 INJECTION, SOLUTION INTRAMUSCULAR; INTRAVENOUS at 18:17

## 2023-09-05 RX ADMIN — DEXTROSE, SODIUM CHLORIDE, AND POTASSIUM CHLORIDE SCH MLS/HR: 5; .9; .15 INJECTION INTRAVENOUS at 11:11

## 2023-09-05 RX ADMIN — HEPARIN SODIUM SCH UNITS: 5000 INJECTION, SOLUTION INTRAVENOUS; SUBCUTANEOUS at 11:48

## 2023-09-05 RX ADMIN — HEPARIN SODIUM SCH UNITS: 5000 INJECTION, SOLUTION INTRAVENOUS; SUBCUTANEOUS at 20:10

## 2023-09-05 RX ADMIN — DEXTROSE, SODIUM CHLORIDE, AND POTASSIUM CHLORIDE SCH MLS/HR: 5; .9; .15 INJECTION INTRAVENOUS at 20:55

## 2023-09-05 RX ADMIN — INSULIN GLARGINE SCH: 100 INJECTION, SOLUTION SUBCUTANEOUS at 20:10

## 2023-09-05 RX ADMIN — INSULIN GLARGINE SCH UNITS: 100 INJECTION, SOLUTION SUBCUTANEOUS at 20:20

## 2023-09-06 VITALS — HEART RATE: 105 BPM

## 2023-09-06 VITALS — DIASTOLIC BLOOD PRESSURE: 56 MMHG | SYSTOLIC BLOOD PRESSURE: 123 MMHG

## 2023-09-06 LAB
ALBUMIN SERPL-MCNC: 2.9 G/DL (ref 3.4–5)
ALBUMIN/GLOB SERPL: 0.9 {RATIO} (ref 0.8–2)
ALP SERPL-CCNC: 106 U/L (ref 46–116)
ALT SERPL-CCNC: 30 U/L (ref 12–78)
ANION GAP SERPL CALC-SCNC: 15.7 MMOL/L (ref 5–15)
AST SERPL-CCNC: 40 U/L (ref 15–37)
BASOPHILS # BLD AUTO: 0 K/UL (ref 0.02–0.1)
BASOPHILS NFR BLD AUTO: 0 % (ref 0–0.5)
BILIRUB SERPL-MCNC: 0.6 MG/DL (ref 0–1)
BUN SERPL-MCNC: 10 MG/DL (ref 8–26)
BUN/CREAT SERPL: 6.8 (ref 6–25)
CALCIUM SERPL-MCNC: 8.4 MG/DL (ref 8.5–10.1)
CHLORIDE SERPL-SCNC: 105 MMOL/L (ref 98–107)
CO2 SERPL-SCNC: 24.5 MMOL/L (ref 21–32)
CREAT CL 24H UR+SERPL-VRATE: 36.28 ML/MIN
CREAT SERPL-MCNC: 1.47 MG/DL (ref 0.55–1.02)
EOSINOPHIL # BLD AUTO: 0 K/UL (ref 0.04–0.4)
EOSINOPHIL NFR BLD AUTO: 0 % (ref 1–5)
ERYTHROCYTE [DISTWIDTH] IN BLOOD BY AUTOMATED COUNT: 14.7 % (ref 11–16)
GLOBULIN SER-MCNC: 3.3 G/DL (ref 2.2–4.2)
GLUCOSE SERPL-MCNC: 92 MG/DL (ref 74–100)
HCT VFR BLD AUTO: 48.4 % (ref 37–47)
HGB BLD-MCNC: 15.8 G/DL (ref 11.5–16.5)
LYMPHOCYTES # BLD AUTO: 0.26 K/UL (ref 1.5–4)
LYMPHOCYTES NFR BLD AUTO: 3.6 % (ref 20–40)
MCH RBC QN AUTO: 31.2 PG (ref 27–32)
MCHC RBC AUTO-ENTMCNC: 32.6 G/DL (ref 31–35)
MCHC RBC AUTO-ENTMCNC: 96 FL (ref 76–96)
MONOCYTES # BLD AUTO: 0.31 K/UL (ref 0.2–0.8)
MONOCYTES NFR BLD AUTO: 4.3 % (ref 3–10)
NEUTROPHILS # BLD AUTO: 6.71 K/UL (ref 2–7.5)
NEUTROPHILS NFR BLD AUTO: 92.1 % (ref 45–70)
PLATELET # BLD AUTO: 163 K/UL (ref 150–500)
PMV BLD AUTO: 11 FL (ref 6–10)
POTASSIUM SERPL-SCNC: 3.2 MMOL/L (ref 3.5–5.1)
PROT SERPL-MCNC: 6.2 G/DL (ref 6.4–8.2)
RBC # BLD AUTO: 5.06 M/UL (ref 3.8–5.8)
SODIUM SERPL-SCNC: 142 MMOL/L (ref 136–145)
WBC # BLD AUTO: 7.3 K/UL (ref 4–11)

## 2023-09-06 RX ADMIN — DEXTROSE, SODIUM CHLORIDE, AND POTASSIUM CHLORIDE SCH MLS/HR: 5; .9; .15 INJECTION INTRAVENOUS at 03:44

## 2023-09-06 RX ADMIN — MOMETASONE FUROATE SCH: 220 INHALANT RESPIRATORY (INHALATION) at 22:09

## 2023-09-06 RX ADMIN — HEPARIN SODIUM SCH: 5000 INJECTION, SOLUTION INTRAVENOUS; SUBCUTANEOUS at 22:08

## 2023-09-06 RX ADMIN — POTASSIUM CHLORIDE SCH MEQ: 1500 TABLET, EXTENDED RELEASE ORAL at 08:38

## 2023-09-06 RX ADMIN — INSULIN GLARGINE SCH: 100 INJECTION, SOLUTION SUBCUTANEOUS at 22:09

## 2023-09-06 RX ADMIN — HEPARIN SODIUM SCH UNITS: 5000 INJECTION, SOLUTION INTRAVENOUS; SUBCUTANEOUS at 12:42

## 2023-09-06 RX ADMIN — ONDANSETRON PRN MG: 2 INJECTION, SOLUTION INTRAMUSCULAR; INTRAVENOUS at 12:54

## 2023-09-06 RX ADMIN — POTASSIUM CHLORIDE SCH MEQ: 1500 TABLET, EXTENDED RELEASE ORAL at 17:39

## 2023-09-06 RX ADMIN — HEPARIN SODIUM SCH UNITS: 5000 INJECTION, SOLUTION INTRAVENOUS; SUBCUTANEOUS at 05:16

## 2023-09-06 RX ADMIN — MOMETASONE FUROATE SCH PUFF: 220 INHALANT RESPIRATORY (INHALATION) at 09:22

## 2023-10-17 ENCOUNTER — HOSPITAL ENCOUNTER (EMERGENCY)
Dept: HOSPITAL 60 - LB.ED | Age: 58
Discharge: HOME | End: 2023-10-17
Payer: MEDICARE

## 2023-10-17 VITALS — DIASTOLIC BLOOD PRESSURE: 69 MMHG | SYSTOLIC BLOOD PRESSURE: 135 MMHG | HEART RATE: 120 BPM

## 2023-10-17 DIAGNOSIS — I10: ICD-10-CM

## 2023-10-17 DIAGNOSIS — Z88.8: ICD-10-CM

## 2023-10-17 DIAGNOSIS — Z79.899: ICD-10-CM

## 2023-10-17 DIAGNOSIS — Z79.4: ICD-10-CM

## 2023-10-17 DIAGNOSIS — Z91.048: ICD-10-CM

## 2023-10-17 DIAGNOSIS — Z88.1: ICD-10-CM

## 2023-10-17 DIAGNOSIS — I48.91: Primary | ICD-10-CM

## 2023-10-17 DIAGNOSIS — E78.00: ICD-10-CM

## 2023-10-17 DIAGNOSIS — Z88.0: ICD-10-CM

## 2023-10-17 DIAGNOSIS — J44.9: ICD-10-CM

## 2023-10-17 LAB
ALBUMIN SERPL-MCNC: 4 G/DL (ref 3.4–5)
ALBUMIN/GLOB SERPL: 1.5 {RATIO} (ref 0.8–2)
ALP SERPL-CCNC: 79 U/L (ref 46–116)
ALT SERPL-CCNC: 39 U/L (ref 12–78)
ANION GAP SERPL CALC-SCNC: 18.2 MMOL/L (ref 5–15)
APTT PPP: 26.2 SECONDS (ref 24.4–33.2)
AST SERPL-CCNC: 21 U/L (ref 15–37)
BASOPHILS # BLD AUTO: 0.08 K/UL (ref 0.02–0.1)
BASOPHILS NFR BLD AUTO: 0.8 % (ref 0–0.5)
BILIRUB SERPL-MCNC: 0.6 MG/DL (ref 0–1)
BUN SERPL-MCNC: 13 MG/DL (ref 8–26)
BUN/CREAT SERPL: 10.7 (ref 6–25)
CALCIUM SERPL-MCNC: 9 MG/DL (ref 8.5–10.1)
CHLORIDE SERPL-SCNC: 98 MMOL/L (ref 98–107)
CO2 SERPL-SCNC: 24.3 MMOL/L (ref 21–32)
CREAT CL 24H UR+SERPL-VRATE: 43.4 ML/MIN
CREAT SERPL-MCNC: 1.22 MG/DL (ref 0.55–1.02)
EOSINOPHIL # BLD AUTO: 0.04 K/UL (ref 0.04–0.4)
EOSINOPHIL NFR BLD AUTO: 0.4 % (ref 1–5)
ERYTHROCYTE [DISTWIDTH] IN BLOOD BY AUTOMATED COUNT: 14.8 % (ref 11–16)
GLOBULIN SER-MCNC: 2.7 G/DL (ref 2.2–4.2)
GLUCOSE SERPL-MCNC: 128 MG/DL (ref 74–100)
HCT VFR BLD AUTO: 49.4 % (ref 37–47)
HGB BLD-MCNC: 16.1 G/DL (ref 11.5–16.5)
INR PPP: 1.1 (ref 1–3.5)
LYMPHOCYTES # BLD AUTO: 0.99 K/UL (ref 1.5–4)
LYMPHOCYTES NFR BLD AUTO: 9.4 % (ref 20–40)
MCH RBC QN AUTO: 30.8 PG (ref 27–32)
MCHC RBC AUTO-ENTMCNC: 32.6 G/DL (ref 31–35)
MCHC RBC AUTO-ENTMCNC: 95 FL (ref 76–96)
MONOCYTES # BLD AUTO: 0.8 K/UL (ref 0.2–0.8)
MONOCYTES NFR BLD AUTO: 7.6 % (ref 3–10)
NEUTROPHILS # BLD AUTO: 8.6 K/UL (ref 2–7.5)
NEUTROPHILS NFR BLD AUTO: 81.8 % (ref 45–70)
PLATELET # BLD AUTO: 181 K/UL (ref 150–500)
PMV BLD AUTO: 11.5 FL (ref 6–10)
POTASSIUM SERPL-SCNC: 3.5 MMOL/L (ref 3.5–5.1)
PROT SERPL-MCNC: 6.7 G/DL (ref 6.4–8.2)
PROTHROMBIN TIME: 11.4 SEC (ref 9–11.5)
RBC # BLD AUTO: 5.22 M/UL (ref 3.8–5.8)
SODIUM SERPL-SCNC: 137 MMOL/L (ref 136–145)
WBC # BLD AUTO: 10.5 K/UL (ref 4–11)

## 2023-10-17 RX ADMIN — KETOROLAC TROMETHAMINE ONE MG: 30 INJECTION, SOLUTION INTRAMUSCULAR at 17:09

## 2023-10-17 RX ADMIN — KETOROLAC TROMETHAMINE ONE: 30 INJECTION, SOLUTION INTRAMUSCULAR at 17:14

## 2023-11-23 PROBLEM — K56.609 SMALL BOWEL OBSTRUCTION (H): Status: ACTIVE | Noted: 2023-01-01

## 2023-11-23 NOTE — ED NOTES
Patient's HR tachycardic and at times is in the 130-140's.  She is SOB after her CT.  Lungs clear to keft side, some slight crackles to the RLL and wheezes heard to RUL.

## 2023-11-23 NOTE — ED PROVIDER NOTES
"  History     Chief Complaint   Patient presents with    Abdominal Pain    Nausea & Vomiting     HPI  Holly Baez is a 58 year old female With history of CHF, bowel obstruction, htn, DM2, CKD, COPD who presents with abd pain and NVD.    Reviewed RN notes below, same history relayed to me    Pt arrives via private vehicle with c/o nausea, vomiting, and abdominal pain for the last four days. Pt states she took tylenol a couple hours prior to arrival.    Allergies:  No Known Allergies    Problem List:    Patient Active Problem List    Diagnosis Date Noted    Small bowel obstruction (H) 11/23/2023     Priority: Medium        Past Medical History:    History reviewed. No pertinent past medical history.    Past Surgical History:    History reviewed. No pertinent surgical history.    Family History:    History reviewed. No pertinent family history.    Social History:  Marital Status:   [2]  Social History     Tobacco Use    Smoking status: Every Day     Packs/day: .25     Types: Cigarettes    Smokeless tobacco: Never   Substance Use Topics    Alcohol use: Not Currently    Drug use: Never        Medications:    No current outpatient medications on file.        Review of Systems   Constitutional:  Negative for chills.   Respiratory:  Negative for chest tightness.    Cardiovascular:  Negative for chest pain.   Gastrointestinal:  Positive for nausea and vomiting.   Genitourinary:  Negative for dysuria.   Musculoskeletal:  Negative for back pain.       Physical Exam   BP: 127/58  Pulse: 108  Temp: 98  F (36.7  C)  Resp: 16  Height: 162.6 cm (5' 4\")  Weight: 107 kg (236 lb)  SpO2: 94 %      Physical Exam  Vitals and nursing note reviewed.   HENT:      Head: Normocephalic.   Cardiovascular:      Rate and Rhythm: Tachycardia present.   Pulmonary:      Effort: Pulmonary effort is normal.      Breath sounds: Normal breath sounds.   Abdominal:      General: Abdomen is flat. Bowel sounds are normal.      Tenderness: There " is no right CVA tenderness or left CVA tenderness.       EKG: Sinus tachycardia, rate 127, QRS duration 86 ms, QTc 616 ms.    Results for orders placed or performed during the hospital encounter of 11/23/23 (from the past 24 hour(s))   CBC with platelets differential    Narrative    The following orders were created for panel order CBC with platelets differential.  Procedure                               Abnormality         Status                     ---------                               -----------         ------                     CBC with platelets and d...[[id number]]  Abnormal            Final result                 Please view results for these tests on the individual orders.   Comprehensive metabolic panel   Result Value Ref Range    Sodium 127 (L) 135 - 145 mmol/L    Potassium 3.8 3.4 - 5.3 mmol/L    Carbon Dioxide (CO2) 27 22 - 29 mmol/L    Anion Gap 17 (H) 7 - 15 mmol/L    Urea Nitrogen 19.7 6.0 - 20.0 mg/dL    Creatinine 0.95 0.51 - 0.95 mg/dL    GFR Estimate 69 >60 mL/min/1.73m2    Calcium 10.7 (H) 8.6 - 10.0 mg/dL    Chloride 83 (L) 98 - 107 mmol/L    Glucose 138 (H) 70 - 99 mg/dL    Alkaline Phosphatase 96 40 - 150 U/L    AST 28 0 - 45 U/L    ALT 64 (H) 0 - 50 U/L    Protein Total 7.4 6.4 - 8.3 g/dL    Albumin 4.6 3.5 - 5.2 g/dL    Bilirubin Total 0.8 <=1.2 mg/dL   Lipase   Result Value Ref Range    Lipase 7 (L) 13 - 60 U/L   Extra Tube (Brant Draw)    Narrative    The following orders were created for panel order Extra Tube (Brant Draw).  Procedure                               Abnormality         Status                     ---------                               -----------         ------                     Extra Blue Top Tube[[id number]]                              Final result               Extra Red Top Tube[[id number]]                               Final result               Extra Green Top (Lithium...[[id number]]                      Final result                 Please view results for  these tests on the individual orders.   CBC with platelets and differential   Result Value Ref Range    WBC Count 21.1 (H) 4.0 - 11.0 10e3/uL    RBC Count 5.55 (H) 3.80 - 5.20 10e6/uL    Hemoglobin 17.3 (H) 11.7 - 15.7 g/dL    Hematocrit 50.0 (H) 35.0 - 47.0 %    MCV 90 78 - 100 fL    MCH 31.2 26.5 - 33.0 pg    MCHC 34.6 31.5 - 36.5 g/dL    RDW 14.1 10.0 - 15.0 %    Platelet Count 244 150 - 450 10e3/uL    % Neutrophils 87 %    % Lymphocytes 4 %    % Monocytes 8 %    % Eosinophils 0 %    % Basophils 0 %    % Immature Granulocytes 1 %    NRBCs per 100 WBC 0 <1 /100    Absolute Neutrophils 18.4 (H) 1.6 - 8.3 10e3/uL    Absolute Lymphocytes 0.7 (L) 0.8 - 5.3 10e3/uL    Absolute Monocytes 1.7 (H) 0.0 - 1.3 10e3/uL    Absolute Eosinophils 0.0 0.0 - 0.7 10e3/uL    Absolute Basophils 0.1 0.0 - 0.2 10e3/uL    Absolute Immature Granulocytes 0.2 <=0.4 10e3/uL    Absolute NRBCs 0.0 10e3/uL   Extra Blue Top Tube   Result Value Ref Range    Hold Specimen x    Extra Red Top Tube   Result Value Ref Range    Hold Specimen x    Extra Green Top (Lithium Heparin) Tube   Result Value Ref Range    Hold Specimen x    Lactic acid whole blood   Result Value Ref Range    Lactic Acid 4.4 (HH) 0.7 - 2.0 mmol/L   CT Abdomen Pelvis w Contrast    Narrative    PROCEDURE:  CT ABDOMEN PELVIS W CONTRAST    HISTORY: abd pain, h/o SBO    TECHNIQUE: Helical CT of the abdomen and pelvis was performed  following injection of intravenous contrast. This CT exam was  performed using one or more the following dose reduction techniques:  automated exposure control, adjustment of the mA and/or kV according  to patient size, and/or iterative reconstruction technique.    COMPARISON: None.    MEDS/CONTRAST: ISOVUE 370, 136mL    FINDINGS:      Limited images through the lung bases demonstrate no focal  consolidation or mass.     The liver demonstrates probable steatosis. The gallbladder is  surgically absent. The spleen, pancreas and adrenal glands  are  unremarkable. Symmetric nephrograms are present without  hydronephrosis. The aorta is normal in size with scattered  atherosclerotic calcifications.    There is distention of the stomach and proximal mid small bowel with  fluid. A focal transition point is questioned in the anterior abdomen  on series 2 image 70 and series 3 image 51. The distal small bowel is  decompressed. Some fluid is seen in the colon. No free air is present.  No hypoenhancing bowel, SMA or SMV thrombosis is seen. Small amount of  free fluid is present.    No suspicious osseous lesions are identified.      Impression    IMPRESSION:      Findings suspicious for mid small bowel obstruction, transition point  in the anterior abdomen most likely on the basis of adhesive disease.    MIROSLAVA NOYOLA MD         SYSTEM ID:  [id number]       Medications   ondansetron (ZOFRAN) injection 4 mg (4 mg Intravenous $Given 11/23/23 1621)   metoclopramide (REGLAN) injection 10 mg (0 mg Intramuscular Hold 11/23/23 1631)   magnesium sulfate 2 g in 50 mL sterile water intermittent infusion (has no administration in time range)   ceFEPIme (MAXIPIME) 2 g vial to attach to  mL bag for ADULTS or 50 mL bag for PEDS (has no administration in time range)   sodium chloride 0.9% BOLUS 1,000 mL (0 mLs Intravenous Stopped 11/23/23 1751)   fentaNYL (PF) (SUBLIMAZE) injection 50 mcg (50 mcg Intravenous $Given 11/23/23 1622)   iopamidol (ISOVUE-370) solution 100 mL (136 mLs Intravenous $Given 11/23/23 1717)   metoclopramide (REGLAN) injection 5 mg (5 mg Intravenous $Given 11/23/23 1746)   diphenhydrAMINE (BENADRYL) injection 12.5 mg (12.5 mg Intravenous $Given 11/23/23 1745)     Due to QTc empiric magnesium was administered.  Assessments & Plan (with Medical Decision Making)     I have reviewed the nursing notes.    I have reviewed the findings, diagnosis, plan and need for follow up with the patient.    Medical Decision Making  The patient's presentation was of  high complexity (an acute health issue posing potential threat to life or bodily function).    The patient's evaluation involved:  history and exam without other MDM data elements    The patient's management necessitated high risk (a decision regarding hospitalization).    I spoke with Dr. Kraft and Dr. Esquivel, patient will be admitted here.  NG now, serial lactate.      6:14 PM--I just checked on Holly, she looks better than my last assessment.  She is resting more comfortably, less diaphoretic.  I discussed plan of NG and admission, possible surgery for her recurrent bowel obstruction.  Patient verbalized understanding of plan.    New Prescriptions    No medications on file       Final diagnoses:   Small bowel obstruction (H)       11/23/2023   [facility] [place]       Walt Rodríguez MD  11/23/23 1815       Walt Rodríguez MD  11/23/23 1821

## 2023-11-23 NOTE — ED TRIAGE NOTES
Pt arrives via private vehicle with c/o nausea, vomiting, and abdominal pain for the last four days. Pt states she took tylenol a couple hours prior to arrival.      Triage Assessment (Adult)       Row Name 11/23/23 1419          Triage Assessment    Airway WDL WDL        Respiratory WDL    Respiratory WDL WDL        Skin Circulation/Temperature WDL    Skin Circulation/Temperature WDL WDL        Cardiac WDL    Cardiac WDL WDL        Peripheral/Neurovascular WDL    Peripheral Neurovascular WDL WDL        Cognitive/Neuro/Behavioral WDL    Cognitive/Neuro/Behavioral WDL WDL

## 2023-11-23 NOTE — PROGRESS NOTES
1.  Has the patient had a previous reaction to IV contrast? No    2.  Does the patient have kidney disease? No    3.  Is the patient on dialysis? No    If YES to any of these questions, exam will be reviewed with a Radiologist before administering contrast.     IV Contrast- Discharge Instructions After Your CT Scan      The IV contrast you received today will be filtered from your bloodstream by your kidneys during the next 24 hours and pass from the body in urine.  You will not be aware of this process and your urine will not change in color.  To help this process you should drink at least 4 additional glasses of water or juice today.  This reduces stress on your kidneys.    Most contrast reactions are immediate.  Should you develop symptoms of concern after discharge, contact the department at the number below.  After hours you should contact your personal physician.  If you develop breathing distress or wheezing, call 911.

## 2023-11-24 PROBLEM — I38 VALVULAR HEART DISEASE: Status: ACTIVE | Noted: 2020-11-05

## 2023-11-24 PROBLEM — G47.33 OSA (OBSTRUCTIVE SLEEP APNEA): Status: ACTIVE | Noted: 2023-01-01

## 2023-11-24 PROBLEM — I48.0 PAROXYSMAL ATRIAL FIBRILLATION (H): Status: ACTIVE | Noted: 2023-01-01

## 2023-11-24 PROBLEM — N18.9 CHRONIC KIDNEY DISEASE: Status: ACTIVE | Noted: 2023-01-01

## 2023-11-24 PROBLEM — J44.9 COPD (CHRONIC OBSTRUCTIVE PULMONARY DISEASE) (H): Chronic | Status: ACTIVE | Noted: 2020-11-05

## 2023-11-24 PROBLEM — E11.9 DIABETES MELLITUS TYPE 2 WITHOUT RETINOPATHY (H): Chronic | Status: ACTIVE | Noted: 2021-05-03

## 2023-11-24 NOTE — PROGRESS NOTES
GENERAL SURGERY PROGRESS NOTE  11/24/2023      Interval history:   No acute events overnight.  Patient states pain is slightly improved though she still has mild discomfort across her mid abdomen.  Denies nausea or vomiting.  States she has an appetite this morning also states she passed some gas.  No bowel movement since prior to admission.  Denies fevers or chills.    Physical Exam:   Vital signs are reviewed and there are no significant abnormalities.  Tachycardia is improved    UOP over past 24 hours is 1700 mL  NGT 2450mL    General: Sitting up in bed, appears comfortable  HEENT: Nasogastric tube in place pulling clear gastric contents, nasal cannula oxygen in place  Abdomen: Abdomen is distended, mildly tympanic, mild tenderness to palpation, no peritoneal signs    Labs are reviewed and are significant for sodium 132, potassium 3.9, creatinine 0.85, lactic acid 1.8, WBC 11.5, hemoglobin 14.4, platelet 182    No new imaging       Assessment / Plan:   Holly Baez is a 58 year old female with small bowel obstruction and exacerbation of chronic medical conditions.  She is feeling better this morning and is passing some gas, hopefully this is indication that her obstruction is resolving.      Continue NG tube to low intermittent wall suction today  Ambulate as tolerated  Appreciate internal medicine cares for chronic health conditions    SHANI Kraft MD   11/24/2023

## 2023-11-24 NOTE — ED PROVIDER NOTES
Emergency Department Transfer of Care Note    Assessment / Plan / Medical Decision Making   Agree with previous provider's plan and exam.     Transfer of Care Plan:  58-year-old female history of CHF, CKD, COPD presents with several days of abdominal pain, nausea, vomiting, diarrhea.  Found to have small bowel obstruction on CT and incidentally have hyponatremia to 127, leukocytosis to 21, tachypnea and hypoxia in addition to persistent lactic elevation.  Appears to be exhibiting multifocal atrial tachycardia versus sinus tachycardia with PACs with persistent tachycardia to 120s- given dose of metoprolol with improvement. Blood pressure despite these metabolic abnormalities and degree of illness is maintained in 130s to 120s over 60s to 90s.  Has received analgesia, fluids, antibiotic therapy. Admit to tele hospitalist to ICU.       There are no discharge medications for this patient.      Final diagnoses:   Small bowel obstruction (H)   Hyponatremia   Multifocal atrial tachycardia   Lactic acidosis       Sadi Rutledge MD  11/23/2023   [facility]     Sadi Rutledge MD  11/23/23 2100

## 2023-11-24 NOTE — PROGRESS NOTES
Patient noted to be in a sustained  to 180 bpm lasting 7 minutes before spontaneously converting as I was preparing to administer adenosine. Patient did not report any symptoms throughout the episode however she did become hypotensive just prior to converting back to a normal sinus rhythm.  Telemetry ICU was aware of the situation.  Labs were ordered and a bolus was given, no further recommendations were given time.

## 2023-11-24 NOTE — PHARMACY-ADMISSION MEDICATION HISTORY
Addendum to Initial Med Rec:    -Prednisone dose CHANGED from 5 mg daily to 30 mg daily.  It appears patient has been taking 25-30 mg daily (with some 50 mg daily bursts) for the past year per sure scripts refill history.  Medication list from [facility] states 25 mg daily, but 30 mg daily was most recently filled.    -Oxcarbazepine dose CHANGED from 150 mg to 300 mg BID per [facility] med list.    -Patient also had hydrocodone/apap 5/325 - 1 tab PO BID PRN pain #50 on 10/30/23.  Attempted to call pharmacy to confirm when this was picked up but could not reach anyone.  Did not add this to the med list as unable to confirm.    -Patient's preferred pharmacy was updated to [facility] on 11/24/2023 at 4:26 PM

## 2023-11-24 NOTE — CONSULTS
GENERAL SURGERY CONSULTATION NOTE    Holly Baez   [address]  [place] [address]  58 year old  female    Primary Care Provider:  No Ref-Primary, Physician      HPI: Holly Baez presents to the emergency department with abdominal pain, nausea and vomiting for the past 3 to 4 days.  Patient states she started feeling mild abdominal pain a few days ago but this has slowly been progressive and yesterday and today she has had significant nausea and vomiting.  She has had a couple small bowel movements recently but really is not passing much gas.  She has no appetite and is really not been eating much over the past 2 days.  She has had some sips of water, that is all that taste good.  But, even then she gets nauseous and has been throwing up.  Denies fevers or chills.  Patient has a significant abdominal surgical history including laparotomy as an infant, numerous  section.  Laparotomy for bowel obstruction in the past.  She has had appendectomy and hysterectomy.    REVIEW OF SYSTEMS:    GENERAL: No fevers or chills. Denies fatigue, recent weight loss.  HEENT: No sinus drainage. No changes with vision or hearing. No difficulty swallowing.   LYMPHATICS:  Noswollen nodes in axilla, neck or groin.  CARDIOVASCULAR: Denies chest pain, palpitations and dyspnea on exertion.  PULMONARY: No shortness of breath or cough. No increase in sputum production.  GI: Denies melena,bright red blood in stools. No hematemesis. No constipation or diarrhea.  : No dysuria or hematuria.  SKIN: No recent rashes or ulcers.   HEMATOLOGY:  No history of easy bruising or bleeding.  ENDOCRINE:  No history of diabetes or thyroid problems.  NEUROLOGY:  No history of seizures or headaches. No motor or sensory changes.        Patient Active Problem List   Diagnosis    Small bowel obstruction (H)       History reviewed. No pertinent past medical history.    History reviewed. No pertinent surgical history.    History reviewed. No  "pertinent family history.    Social History     Social History Narrative    Not on file       Social History     Socioeconomic History    Marital status:      Spouse name: Not on file    Number of children: Not on file    Years of education: Not on file    Highest education level: Not on file   Occupational History    Not on file   Tobacco Use    Smoking status: Every Day     Packs/day: .25     Types: Cigarettes    Smokeless tobacco: Never   Substance and Sexual Activity    Alcohol use: Not Currently    Drug use: Never    Sexual activity: Not on file   Other Topics Concern    Not on file   Social History Narrative    Not on file     Social Determinants of Health     Financial Resource Strain: Not on file   Food Insecurity: Not on file   Transportation Needs: Not on file   Physical Activity: Not on file   Stress: Not on file   Social Connections: Not on file   Interpersonal Safety: Not on file   Housing Stability: Not on file       No current facility-administered medications on file prior to encounter.  No current outpatient medications on file prior to encounter.        ALLERGIES/SENSITIVITIES: No Known Allergies    PHYSICAL EXAM:     BP (!) 136/108   Pulse (!) 121   Temp 98  F (36.7  C)   Resp 24   Ht 1.626 m (5' 4\")   Wt 107 kg (236 lb)   SpO2 90%   BMI 40.51 kg/m      General Appearance:   Sitting up in the chair, mild distress  HEENT: Pupils are equal and reactive, no scleral icterus, nasogastric tube in place pulling clear green stomach contents.  Nasal cannula oxygen in place  Heart & CV: Tachycardic, regular, no murmur  LUNGS: No increased work of breathing.good air entry bilaterally, clear  Abd: Obese abdomen, significantly distended, mild generalized tenderness without peritoneal signs.  Scars are consistent with given surgical history.  No evidence of hernia.  Ext: Normal bulk and tone, no lower extremity edema  Neuro: Alert and oriented, normal speech and mentation    Labs are reviewed and " are significant for sodium 127, potassium 3.8, creatinine 0.95, calcium 10.7, albumin 4.6, alk phos 96, ALT 64, AST 28, lactate 4.4-repeat 3.0, WBC 21.1, hemoglobin 17.3, platelet 244    CT scan abdomen pelvis: There is distention of the stomach and proximal mid small bowel with fluid. A focal transition point is questioned in the anterior abdomen on series 2 image 70 and series 3 image 51. The distal small bowel is  decompressed. Some fluid is seen in the colon. No free air is present.  No hypoenhancing bowel, SMA or SMV thrombosis is seen. Small amount of  free fluid is present.      CONSULTATION ASSESSMENT AND PLAN:    58 year old female with small bowel obstruction and tachycardia.  Lactic acid is improving and physical exam is reassuring that the patient does not need emergent surgery for threatened bowel.  The etiology of her obstruction is almost certainly adhesive disease from previous surgery.  Patient is a poor surgical candidate at the moment, due to likely exacerbation of her chronic health conditions, C HF, COPD.  Patient will be admitted for medical optimization and at this point I would recommend conservative treatment of the bowel obstruction.  As I am interviewing the patient approximately 1 L of enteric contents were emptied from the NG tube and she is describing some relief with this.  Will plan for serial abdominal exams, serial labs and admission to the ICU due to tachycardia, possible new atrial fibrillation with RVR.    Andrea Kraft MD on 11/23/2023 at 7:06 PM

## 2023-11-24 NOTE — H&P
"[facility]    History and Physical - Hospitalist Service       Date of Admission:  11/23/2023    Assessment & Plan      Holly Baez is a 58 year old female admitted on 11/23/2023. She is admitted for Recurrent Small Bowel Obstruction in setting of multiple prior abdominal surgeries, High Anion Gap Metabolic Acidosis, Hyponatremia due to hypovolemia, Lactic acidosis, Tachycardia. Med history of COPD, CHF, A Fib, \"three bad heart valves\", IBS, Obesity.    Admit to Medicine  NGT placed in ED, continue same  NPO except ice chips  1L NS Bolus given, as well as Cefepime, Zofran  Monitor closely for signs of fluid overload  Serial abdominal exams, - currently no rebound or rigidity, no guarding.   Dilaudid PRN pain (currently 7/10)  Surgery will likely need to be consulted, as pt has required LONNIE in the past  Monitor lactate and sodium  Check UA and CXR given marked leukocytosis, though this may be reactive  At the time of this writing, patient's medication list is not yet updated and patient could not recall the names of any of her medications or doses.    We will place on Heparin drip for A Fib as patient is not taking PO meds at this time.  Continue IV Fluid  Follow up confirmed med list.         Diet:  NPO except ice chips  DVT Prophylaxis: Heparin SQ  Baker Catheter: Not present  Lines: None     Code Status:  Full Code    Clinically Significant Risk Factors Present on Admission         # Hyponatremia: Lowest Na = 127 mmol/L in last 2 days, will monitor as appropriate   # Hypercalcemia: Highest Ca = 10.7 mg/dL in last 2 days, will monitor as appropriate          # Acute Respiratory Failure: Documented O2 saturation < 91%.  Continue supplemental oxygen as needed     # Severe Obesity: Estimated body mass index is 40.51 kg/m  as calculated from the following:    Height as of this encounter: 1.626 m (5' 4\").    Weight as of this encounter: 107 kg (236 lb).              Disposition Plan    " "  Expected Discharge Date: 11/25/2023                The patient's care was discussed with the Bedside Nurse and Patient.        Tabby Rey DO  [facility]  Securely message with the Voc[employer] Web Console (learn more here)  Text page via [facility] Paging/Directory      Visit/Communication Style   Virtual (Video) communication was used to evaluate Holly.  Holly consented to the use of video communication: yes  Date of Service: 11/23/2023   Patient's location: [facility]   Provider's location during the visit: [facility] Tele-medicine site  [place]      ______________________________________________________________________    Chief Complaint   Abdominal Pain    History is obtained from the patient    History of Present Illness   Holly Baez is a 58 year old female who has a PMH of COPD, CHF, Atrial fibrillation, Severe Obesity, \"3 bad heart valves\", IBS, SBO s/p LONNIE, Multiple abdominal surgeries, who presented to the ED with complaint of severe, diffuse abdominal pain and distension.  She was noted to have a small bowel obstruction on CT.  An NGT was placed in the ED and patient was given 1L NS bolus as she was found to have profound lactic acidosis of 4.4 as well as hyponatremia.  Medicine was consulted for admission.    On my exam, patient complains of ongoing 7/10 intermittent diffuse abdominal pain which is worsened with movement and relieved by rest.    She has not passed flatus today and has had no bowel movements. She reports nausea and vomiting and shortness of breath.        Review of Systems    General: negative for fever, chills,  weakness  Respiratory:  negative for sputum production, wheezing, DICKEY, pleuritic pain,cough  Cardiology:  negative for chest pain, palpitations  Gastrointestinal: negative for diarrhea, hematemesis, melena or hematochezia  Genitourinary: negative for frequency, urgency, dysuria, hematuria   Neurological: negative for focal " weakness, paresthesia    Past Medical History    I have reviewed this patient's medical history and updated it with pertinent information if needed.   CHF  A Fib  COPD  Heart valve d/o  Morbid Obesity  IBS  SBO    Past Surgical History   I have reviewed this patient's surgical history and updated it with pertinent information if needed.  Past Surgical History:   Procedure Laterality Date    APPENDECTOMY       SECTION     Lysis of adhesions    Social History   I have reviewed this patient's social history and updated it with pertinent information if needed.  Social History     Tobacco Use    Smoking status: Every Day     Packs/day: .25     Types: Cigarettes    Smokeless tobacco: Never   Substance Use Topics    Alcohol use: Not Currently    Drug use: Never       Family History   I have reviewed this patient's family history and updated it with pertinent information if needed.  Family History   Problem Relation Age of Onset    Multiple Sclerosis Mother     No Known Problems Father          Prior to Admission Medications   None     Allergies   No Known Allergies    Physical Exam   Vital Signs: Temp: 98  F (36.7  C)   BP: 110/80 Pulse: 103   Resp: 24 SpO2: 98 % O2 Device: Nasal cannula Oxygen Delivery: 1 LPM  Weight: 236 lbs 0 oz    Gen:  Well-developed, well-nourished, in no acute distress, lying semi-supine in hospital stretcher  HEENT:  Anicteric sclera, PER, hearing intact to voice  Resp:  No accessory muscle use, breath sounds clear; no wheezes no rales no rhonchi. Venti mask in place. NGT in place  Card:  No murmur, normal S1, S2   Abd:  Soft per RN exam, + Diffuse TTP, +distended, hypoactive bowel sounds, no guarding or rebound. Abdomen is not rigid per RN.  Musc:  Normal strength and movement of the major muscle groups without obvious deformity  Psych:  Good insight, oriented to person, place and time, cooperative    Data     Recent Labs   Lab 23  1604   WBC  --  21.1*   HGB  --   17.3*   MCV  --  90   PLT  --  244   NA  --  127*   POTASSIUM  --  3.8   CHLORIDE  --  83*   CO2  --  27   BUN  --  19.7   CR  --  0.95   ANIONGAP  --  17*   NORAH  --  10.7*   * 138*   ALBUMIN  --  4.6   PROTTOTAL  --  7.4   BILITOTAL  --  0.8   ALKPHOS  --  96   ALT  --  64*   AST  --  28   LIPASE  --  7*         Recent Results (from the past 24 hour(s))   CT Abdomen Pelvis w Contrast    Narrative    PROCEDURE:  CT ABDOMEN PELVIS W CONTRAST    HISTORY: abd pain, h/o SBO    TECHNIQUE: Helical CT of the abdomen and pelvis was performed  following injection of intravenous contrast. This CT exam was  performed using one or more the following dose reduction techniques:  automated exposure control, adjustment of the mA and/or kV according  to patient size, and/or iterative reconstruction technique.    COMPARISON: None.    MEDS/CONTRAST: ISOVUE 370, 136mL    FINDINGS:      Limited images through the lung bases demonstrate no focal  consolidation or mass.     The liver demonstrates probable steatosis. The gallbladder is  surgically absent. The spleen, pancreas and adrenal glands are  unremarkable. Symmetric nephrograms are present without  hydronephrosis. The aorta is normal in size with scattered  atherosclerotic calcifications.    There is distention of the stomach and proximal mid small bowel with  fluid. A focal transition point is questioned in the anterior abdomen  on series 2 image 70 and series 3 image 51. The distal small bowel is  decompressed. Some fluid is seen in the colon. No free air is present.  No hypoenhancing bowel, SMA or SMV thrombosis is seen. Small amount of  free fluid is present.    No suspicious osseous lesions are identified.      Impression    IMPRESSION:      Findings suspicious for mid small bowel obstruction, transition point  in the anterior abdomen most likely on the basis of adhesive disease.    MIROSLAVA NOYOLA MD         SYSTEM ID:  [id number]   XR Chest Port 1 View    Narrative     PROCEDURE:  XR CHEST PORT 1 VIEW    HISTORY: hypoxia. .    COMPARISON:  None.    FINDINGS:  An enteric tube is partially visualized.  The cardiomediastinal contours are enlarged.  Patchy airspace opacity is questioned in the right midlung. No  effusion or pneumothorax.      Impression    IMPRESSION:  Limited portable penetration. Question focal infiltrate  right midlung.  Cardiomegaly.    MIROSLAVA NOYOLA MD         SYSTEM ID:  [id number]

## 2023-11-24 NOTE — PROGRESS NOTES
NG noted to be out to 48 cm obtained chest x-ray to verify placement and advanced to 55 cm.  Output is gastric in appearance.

## 2023-11-24 NOTE — PROGRESS NOTES
Patient was again found to be in a sustained SVT in the 170s.  Telemetry ICU present via monitor. 5 mg of Lopressor given.  Patient did eventually convert back to normal sinus rhythm roughly a minute after administering Lopressor.  Order obtained for as needed and scheduled Lopressor.  BiPAP was ordered as well by telemetry ICU which the patient refused.

## 2023-11-24 NOTE — PHARMACY-ADMISSION MEDICATION HISTORY
Pharmacist Admission Medication History    Admission medication history is complete. The information provided in this note is only as accurate as the sources available at the time of the update.    Information Source(s): Patient, Facility (TCU/NH/) medication list/MAR, and CareEverywhere/SureScripts via in-person and Medication List    Pertinent Information: Patient's medication is setup by [facility] in [place] and was the primary source for the patient's medication reconciliation. Patient stated that she had only taken her morning dose the day of being admitted to [facility].    Changes made to PTA medication list:  Added: Roflumilast, Calcium + Vitamin D3, Docusate, Codeine/Guaifenesin, Magnesium, meloxicam, Metoprolol Tart 25 mg, Montelukast, Omeprazole, Oxcarbazepine, Bactrim Ds, sumatriptan, Spiriva, tizanidine, topiramate, varenicline   Deleted: Wellbutrin 100 mg BID, Cefuroxime, prednisone 20 mg,   Changed: None    Medication Affordability:  Not including over the counter (OTC) medications, was there a time in the past 3 months when you did not take your medications as prescribed because of cost?: No    Allergies reviewed with patient and updates made in EHR: yes    Medication History Completed By: Samir Whyte RPH 11/24/2023 12:30 PM    Prior to Admission medications    Medication Sig Last Dose Taking? Auth Provider Long Term End Date   acetaminophen (TYLENOL) 650 MG CR tablet Take 650 mg by mouth every 8 hours as needed for pain Past Week Yes Reported, Patient     albuterol (PROAIR HFA/PROVENTIL HFA/VENTOLIN HFA) 108 (90 Base) MCG/ACT inhaler Inhale 1-2 puffs into the lungs every 6 hours as needed for shortness of breath, wheezing or cough 11/23/2023 Yes Unknown, Entered By History     benzonatate (TESSALON) 200 MG capsule Take 200 mg by mouth 3 times daily as needed for cough 11/23/2023 at AM Yes Reported, Patient     calcium carbonate-vitamin D (CALTRATE) 600-10 MG-MCG per tablet  Take 1 tablet by mouth 2 times daily 11/23/2023 at AM Yes Unknown, Entered By History     coenzyme Q-10 200 MG CAPS Take 200 mg by mouth at bedtime 11/22/2023 at PM Yes Unknown, Entered By History     docusate sodium (COLACE) 100 MG capsule Take 100 mg by mouth 2 times daily as needed for constipation More than a month Yes Unknown, Entered By History     ELIQUIS ANTICOAGULANT 5 MG tablet Take 5 mg by mouth 2 times daily 11/23/2023 at am Yes Reported, Patient     furosemide (LASIX) 40 MG tablet Take 40 mg by mouth daily 11/23/2023 at am Yes Reported, Patient Yes    gabapentin (NEURONTIN) 600 MG tablet Take 1,200 mg by mouth 3 times daily 11/23/2023 at AM Yes Reported, Patient Yes    glucose (BD GLUCOSE) 4 g chewable tablet Take 1 tablet by mouth every hour as needed for low blood sugar Unknown Yes Unknown, Entered By History     guaiFENesin-codeine (ROBITUSSIN AC) 100-10 MG/5ML solution Take 5 mLs by mouth every 4 hours as needed for cough Unknown Yes Unknown, Entered By History     insulin lispro (HUMALOG KWIKPEN) 100 UNIT/ML (1 unit dial) KWIKPEN Inject 15 Units into the skin with meals at breakfast and Lunch, Inject 20 Units at supper 11/23/2023 at AM Yes Unknown, Entered By History Yes    ipratropium - albuterol 0.5 mg/2.5 mg/3 mL (DUONEB) 0.5-2.5 (3) MG/3ML neb solution Take 1 vial by nebulization 4 times daily  Yes Unknown, Entered By History Yes    JANUMET  MG tablet Take 1 tablet by mouth 2 times daily (with meals) 11/23/2023 at am Yes Reported, Patient Yes    LANTUS SOLOSTAR 100 UNIT/ML soln Inject 35 Units Subcutaneous at bedtime 11/22/2023 at PM Yes Reported, Patient Yes    losartan (COZAAR) 25 MG tablet Take 12.5 mg by mouth daily 11/23/2023 at AM Yes Reported, Patient Yes    magnesium oxide (MAG-OX) 400 MG tablet Take 400 mg by mouth daily 11/23/2023 at AM Yes Unknown, Entered By History     meloxicam (MOBIC) 7.5 MG tablet Take 7.5 mg by mouth daily With food 11/23/2023 at AM Yes Unknown, Entered  By History Yes    metoprolol succinate ER (TOPROL XL) 25 MG 24 hr tablet Take 1 tablet by mouth daily 11/23/2023 at AM Yes Reported, Patient Yes    mirtazapine (REMERON) 45 MG tablet Take 45 mg by mouth at bedtime 11/22/2023 at PM Yes Reported, Patient Yes    montelukast (SINGULAIR) 10 MG tablet Take 10 mg by mouth at bedtime 11/22/2023 at PM Yes Unknown, Entered By History Yes    nicotine (COMMIT) 2 MG lozenge Place 2 mg inside cheek every hour as needed for smoking cessation More than a month Yes Unknown, Entered By History     omeprazole 20 MG tablet Take 20 mg by mouth 2 times daily (with meals) 11/23/2023 at AM Yes Unknown, Entered By History     ondansetron (ZOFRAN) 4 MG tablet Take 4 mg by mouth every 8 hours as needed More than a month Yes Reported, Patient     OXcarbazepine (TRILEPTAL) 150 MG tablet Take 150 mg by mouth 2 times daily 11/23/2023 at AM Yes Unknown, Entered By History Yes    polyethylene glycol (MIRALAX) 17 g packet Take 1 packet by mouth once as needed for constipation Past Week Yes Reported, Patient     potassium chloride ER (K-TAB/KLOR-CON) 10 MEQ CR tablet Take 1 tablet by mouth 2 times daily 11/23/2023 at AM Yes Reported, Patient     predniSONE (DELTASONE) 5 MG tablet Take 5 mg by mouth daily 11/23/2023 at AM Yes Reported, Patient     QUEtiapine (SEROQUEL) 100 MG tablet Take 100 mg by mouth daily 11/23/2023 at am Yes Reported, Patient Yes    QUEtiapine (SEROQUEL) 400 MG tablet Take 400 mg by mouth at bedtime 11/22/2023 at PM Yes Reported, Patient Yes    QUEtiapine (SEROQUEL) 50 MG tablet Take 50 mg by mouth 3 times daily as needed (Insomia) Past Month at PM Yes Unknown, Entered By History Yes    risperiDONE (RISPERDAL) 2 MG tablet Take 5 mg by mouth at bedtime 11/22/2023 at PM Yes Reported, Patient Yes    roflumilast (DALIRESP) 500 MCG TABS tablet Take 500 mcg by mouth daily 11/23/2023 at AM Yes Unknown, Entered By History Yes    rOPINIRole (REQUIP) 0.5 MG tablet Take 0.5 mg by mouth at  bedtime 11/22/2023 at PM Yes Reported, Patient Yes    RYBELSUS 14 MG tablet Take 1 tablet by mouth daily 11/23/2023 at AM Yes Reported, Patient     spironolactone (ALDACTONE) 25 MG tablet Take 25 mg by mouth 2 times daily 11/23/2023 at AM Yes Reported, Patient Yes    sulfamethoxazole-trimethoprim (BACTRIM DS) 800-160 MG tablet Take 1 Tablet by mouth every Monday, Wednesday, and Friday While on High dose Prednisone 11/22/2023 at PM Yes Unknown, Entered By History     SUMAtriptan (IMITREX) 25 MG tablet Take 25 mg by mouth every 2 hours as needed for migraine (Maximum 8 Tablets daily) More than a month Yes Unknown, Entered By History     SYMBICORT 160-4.5 MCG/ACT Inhaler Inhale 2 puffs into the lungs two times daily Rinse Mouth after Each Use 11/23/2023 at AM Yes Reported, Patient Yes    tiotropium (SPIRIVA RESPIMAT) 2.5 MCG/ACT inhaler Inhale 2 puffs into the lungs daily 11/23/2023 at AM Yes Unknown, Entered By History Yes    tiZANidine (ZANAFLEX) 4 MG tablet Take 4 mg by mouth every 12 hours as needed for muscle spasms More than a month Yes Unknown, Entered By History     topiramate (TOPAMAX) 25 MG tablet Take 25 mg by mouth 2 times daily 11/23/2023 at AM Yes Unknown, Entered By History Yes    traMADol (ULTRAM) 50 MG tablet Take 50 mg by mouth every 6 hours as needed for severe pain More than a month Yes Unknown, Entered By History     varenicline (CHANTIX) 1 MG tablet Take 1 mg by mouth 2 times daily 11/23/2023 at AM Yes Unknown, Entered By History     zolpidem (AMBIEN) 10 MG tablet Take 10 mg by mouth nightly as needed for sleep Past Month Yes Reported, Patient     SM ASPIRIN LOW DOSE 81 MG EC tablet Take 81 mg by mouth daily   Reported, Patient

## 2023-11-24 NOTE — PROGRESS NOTES
[facility]    Medicine Progress Note - Hospitalist Service    Date of Admission:  11/23/2023    Assessment & Plan   Principal Problem:    Small bowel obstruction (H)    Assessment: present on admission. Passing flatus this AM. Less distended. Lactate normalized. Pain improved. NG still in place. Thank you to surgery for consult and management.     Plan: Management per surgery.    Ambulate    Active Problems:    Chronic kidney disease    Assessment: present on admission     Plan: monitor      COPD (chronic obstructive pulmonary disease) (H)    Assessment: not on chronic oxygen, only needing 1 liter here    Plan: Breo for symbicort, as needed albuterol nebs, wean oxygen to off      Diabetes mellitus type 2 without retinopathy (H)    Assessment: chronic    Plan: continue lantus, with sliding scale insulin. Hold scheduled novolog and other diabetic meds.      Essential hypertension, benign    Assessment: chronic    Plan: continue metoprolol, hold other antihypertensives at this time.       NIGEL (obstructive sleep apnea)    Assessment: chronic        Valvular heart disease    Assessment: chronic    Plan: echo today      Tobacco dependence    Assessment: chronic    Plan: nicotine patch, counseled to stop      Paroxysmal atrial fibrillation (H)    Assessment: present on admission, on DOAC chronically     Plan: continue IV metoprolol and heparin drip while NPO and as a surgical possibility.          Diet: NPO for Medical/Clinical Reasons Except for: Ice Chips    DVT Prophylaxis: IV heparin  Baker Catheter: PRESENT, indication: Retention  Lines: None     Cardiac Monitoring: ACTIVE order. Indication: Tachyarrhythmias, acute (48 hours)  Code Status: Full Code      Clinically Significant Risk Factors Present on Admission         # Hyponatremia: Lowest Na = 127 mmol/L in last 2 days, will monitor as appropriate   # Hypercalcemia: Highest Ca = 10.7 mg/dL in last 2 days, will monitor as appropriate     # Drug  "Induced Coagulation Defect: home medication list includes an anticoagulant medication  # Drug Induced Platelet Defect: home medication list includes an antiplatelet medication   # Hypertension: Noted on problem list   # Acute Respiratory Failure: Documented O2 saturation < 91%.  Continue supplemental oxygen as needed     # Severe Obesity: Estimated body mass index is 41.89 kg/m  as calculated from the following:    Height as of this encounter: 1.626 m (5' 4\").    Weight as of this encounter: 110.7 kg (244 lb 0.8 oz).              Disposition Plan      Expected Discharge Date: 11/25/2023                    Remy Villarreal MD  Hospitalist Service  [facility]  Securely message with [employer] (more info)  Text page via [facility] Paging/Directory   ______________________________________________________________________    Interval History   Feeling better. No fevers, chills. No nausea, vomiting. No dyspnea. Pain better controlled.     Physical Exam   Vital Signs: Temp: 96.8  F (36  C) Temp src: Temporal BP: 102/63 Pulse: 78   Resp: 20 SpO2: 91 % O2 Device: Nasal cannula Oxygen Delivery: 1 LPM  Weight: 244 lbs .79 oz    GENERAL: Comfortable, no apparent distress.  CARDIOVASCULAR: regular rate and rhythm, no murmur. No lower extremity edema   RESPIRATORY: Clear to auscultation bilaterally, no wheezes or crackles.  GI: mildly tender, distended, hypoactive bowel sounds.   SKIN: warm periphery, no rashes      Medical Decision Making       50 MINUTES SPENT BY ME on the date of service doing chart review, history, exam, documentation & further activities per the note.      Data     I have personally reviewed the following data over the past 24 hrs:    11.5 (H)  \   14.4   / 182     132 (L) 93 (L) 17.3 /  107 (H)   3.6 28 0.85 \     ALT: 43 AST: 18 AP: 69 TBILI: 0.5   ALB: 3.5 TOT PROTEIN: 5.9 (L) LIPASE: 7 (L)     Trop: N/A BNP: 404     TSH: 2.17 T4: N/A A1C: N/A     Procal: N/A CRP: N/A Lactic Acid: 1.8 "       INR:  N/A PTT:  57 (H)   D-dimer:  N/A Fibrinogen:  N/A       Imaging results reviewed over the past 24 hrs:   Recent Results (from the past 24 hour(s))   CT Abdomen Pelvis w Contrast    Narrative    PROCEDURE:  CT ABDOMEN PELVIS W CONTRAST    HISTORY: abd pain, h/o SBO    TECHNIQUE: Helical CT of the abdomen and pelvis was performed  following injection of intravenous contrast. This CT exam was  performed using one or more the following dose reduction techniques:  automated exposure control, adjustment of the mA and/or kV according  to patient size, and/or iterative reconstruction technique.    COMPARISON: None.    MEDS/CONTRAST: ISOVUE 370, 136mL    FINDINGS:      Limited images through the lung bases demonstrate no focal  consolidation or mass.     The liver demonstrates probable steatosis. The gallbladder is  surgically absent. The spleen, pancreas and adrenal glands are  unremarkable. Symmetric nephrograms are present without  hydronephrosis. The aorta is normal in size with scattered  atherosclerotic calcifications.    There is distention of the stomach and proximal mid small bowel with  fluid. A focal transition point is questioned in the anterior abdomen  on series 2 image 70 and series 3 image 51. The distal small bowel is  decompressed. Some fluid is seen in the colon. No free air is present.  No hypoenhancing bowel, SMA or SMV thrombosis is seen. Small amount of  free fluid is present.    No suspicious osseous lesions are identified.      Impression    IMPRESSION:      Findings suspicious for mid small bowel obstruction, transition point  in the anterior abdomen most likely on the basis of adhesive disease.    MIROSLAVA NOYOLA MD         SYSTEM ID:  [id number]   XR Chest Port 1 View    Narrative    PROCEDURE:  XR CHEST PORT 1 VIEW    HISTORY: hypoxia. .    COMPARISON:  None.    FINDINGS:  An enteric tube is partially visualized.  The cardiomediastinal contours are enlarged.  Patchy airspace  opacity is questioned in the right midlung. No  effusion or pneumothorax.      Impression    IMPRESSION:  Limited portable penetration. Question focal infiltrate  right midlung.  Cardiomegaly.    MIROSLAVA NOYOLA MD         SYSTEM ID:  [id number]   XR Chest Port 1 View    Narrative    PROCEDURE INFORMATION:   Exam: XR Chest   Exam date and time: 11/24/2023 3:58 AM   Age: 58 years old   Clinical indication: Device placement; Ng tube; Additional info: Ng replacement     TECHNIQUE:   Imaging protocol: Radiologic exam of the chest.   Views: 1 view.     COMPARISON:   CR XR CHEST PORT 1 VIEW 11/23/2023 7:06 PM     FINDINGS:   Tubes, catheters and devices: Nasogastric tube is in place. The tip of the   nasogastric tube is not included on this examination.     Lungs: Unremarkable. No consolidation.   Pleural spaces: Unremarkable. No pleural effusion. No pneumothorax.   Heart/Mediastinum:  Mild cardiomegaly.   Bones/joints: Unremarkable.   Examination limited by patient obesity.       Impression    IMPRESSION:   Nasogastric tube is in place. The tip of the nasogastric tube is not included   on this examination.     THIS DOCUMENT HAS BEEN ELECTRONICALLY SIGNED BY SO GRESHAM MD

## 2023-11-24 NOTE — PROGRESS NOTES
Tele ICU provider ordered BIPAP. Patient refused to use BIPAP. She is currently stable on a nasal cannula at 1 LPM, saturating in the mid 90's.    Daniel Dye, RT

## 2023-11-24 NOTE — PROGRESS NOTES
Interdisciplinary Discharge Planning Note    Anticipated Discharge Date: 11/26-11/27    Anticipated Discharge Location: Home    Clinical Needs Before Discharge:  adequate fluid and/or nutritional intake, stable cardiac status (angina, heart failure, arrhythmia), stable oxygen requirement, and Move to floor tomorrow     Treatment Needs After Discharge:  None identified at this time    Potential Barriers to Discharge: None identified at this time    BRITNI Schafer  11/24/2023,  12:56 PM

## 2023-11-24 NOTE — PROGRESS NOTES
Chart reviewed per MST screen: pt unsure about wt loss but has been eating poorly over past ~4 days prior to admit due to abdominal discomfort and nausea. Currently NPO for small bowel obstruction and conservative management. Noted wt loss over past 6 months, ~27#. Wt has been fairly stable now over past 3 months with slight loss. Will monitor intakes and diet tolerance once able to tolerate PO per MD orders. Follow up in 1-5 days.   Diet: NPO  Intakes: monitor when diet advances   Wt Readings from Last 10 Encounters:   11/24/23 110.7 kg (244 lb 0.8 oz)    252# on 9/6/23  259# on 8/8/23  271# on 5/7/23  Kait Mccarty RD on 11/24/2023 at 9:12 AM

## 2023-11-25 NOTE — DISCHARGE SUMMARY
"[facility]  Hospitalist Discharge Summary      Date of Admission:  11/23/2023  Date of Discharge:  11/25/2023  Discharging Provider: Remy Villarreal MD  Discharge Service: Hospitalist Service    Discharge Diagnoses   Principal Problem:    Small bowel obstruction (H)  Active Problems:    Chronic kidney disease stage 2    COPD (chronic obstructive pulmonary disease) (H)    Diabetes mellitus type 2 without retinopathy (H)    Essential hypertension, benign    NIGEL (obstructive sleep apnea)    Valvular heart disease    Tobacco dependence    Paroxysmal atrial fibrillation (H)       Clinically Significant Risk Factors     # Severe Obesity: Estimated body mass index is 41.89 kg/m  as calculated from the following:    Height as of this encounter: 1.626 m (5' 4\").    Weight as of this encounter: 110.7 kg (244 lb 0.8 oz).       Follow-ups Needed After Discharge   Follow-up Appointments     Follow-up and recommended labs and tests       Follow up with primary care provider within 1-2 weeks for hospital follow   up.            Discharge Disposition   Discharged to home  Condition at discharge: Stable    Hospital Course   Principal Problem:    Small bowel obstruction (H)    Assessment: present on admission. Passing stool and flatus. NG removed this AM. Patient is insistent upon discharge. I explained I would like to transfer her to the medical floor and restart her medication, advance her diet. She refuses this and threatens to leave AMA. She did agree to a breakfast of toast. She should follow up with her regular doctor in 1-2 weeks. I offered that if her symptoms recur, she should return to the ER.     Active Problems:    Chronic kidney disease    Assessment: present on admission     Plan: monitor      COPD (chronic obstructive pulmonary disease) (H)    Assessment: not on chronic oxygen, refusing any further oxygen.       Diabetes mellitus type 2 without retinopathy (H)    Assessment: chronic    Plan: " continue lantus, with sliding scale insulin. Hold scheduled novolog and other diabetic meds.      Essential hypertension, benign    Assessment: chronic    Plan: continue metoprolol, hold other antihypertensives at this time.       NIGEL (obstructive sleep apnea)    Assessment: chronic        Valvular heart disease    Assessment: chronic    Plan: echo today      Tobacco dependence    Assessment: chronic    Plan: nicotine patch, counseled to stop      Paroxysmal atrial fibrillation (H)    Assessment: present on admission, on DOAC chronically       Consultations This Hospital Stay   PHARMACY IP CONSULT    Code Status   Full Code    Time Spent on this Encounter   I, Remy Villarreal MD, personally saw the patient today and spent greater than 30 minutes discharging this patient.       Remy Villarreal MD  [facility]  [address]  [place] [address]  Phone: [phone]  Fax: [phone]  ______________________________________________________________________    Physical Exam   Vital Signs: Temp: 97.2  F (36.2  C) Temp src: Temporal BP: (!) 156/118 Pulse: 91   Resp: 14 SpO2: 95 % O2 Device: Nasal cannula Oxygen Delivery: 1 LPM  Weight: 244 lbs .79 oz  GENERAL: Comfortable, talkative, in no apparent distress.  HEENT: Anicteric, non-injected sclera, mouth moist.   NECK: No JVD.  CARDIOVASCULAR: regular rate and rhythm, no murmur. No lower extremity edema   RESPIRATORY: Clear to auscultation bilaterally, no wheezes, no crackles.  GI: Non-distended, normal bowel sounds, soft, non-tender.  SKIN: No rashes, sores.   NEUROLOGY: Alert and oriented x3, follows commands, speech and language normal.          Primary Care Physician   Physician No Ref-Primary    Discharge Orders      Reason for your hospital stay    Small bowel obstruction. We talked about staying for an additional day, but you are insistent upon discharge. If your symptoms worsen again, please come back to the ED.     Follow-up  and recommended labs and tests     Follow up with primary care provider within 1-2 weeks for hospital follow up.     Activity    Your activity upon discharge: activity as tolerated     Diet    Follow this diet upon discharge: Orders Placed This Encounter      Regular Diet Adult       Significant Results and Procedures   Most Recent 3 CBC's:  Recent Labs   Lab Test 11/25/23  0521 11/24/23 0352 11/24/23  0026   WBC 8.9 11.5* 13.5*   HGB 13.2 14.4 15.7   MCV 94 92 91   * 182 195     Most Recent 3 BMP's:  Recent Labs   Lab Test 11/25/23  0521 11/25/23  0516 11/25/23  0128 11/24/23  0859 11/24/23 0352 11/24/23  0026     --   --   --  132* 129*   POTASSIUM 4.5  --   --   --  3.6 3.9   CHLORIDE 98  --   --   --  93* 87*   CO2 23  --   --   --  28 29   BUN 11.5  --   --   --  17.3 18.4   CR 0.64  --   --   --  0.85 1.00*   ANIONGAP 14  --   --   --  11 13   NORAH 7.8*  --   --   --  8.7 9.8   * 111* 93   < > 103* 137*    < > = values in this interval not displayed.     Most Recent 2 LFT's:  Recent Labs   Lab Test 11/25/23 0521 11/24/23 0352 11/23/23  1604   AST  --  18 28   ALT 40 43 64*   ALKPHOS 74 69 96   BILITOTAL 0.6 0.5 0.8   ,   Results for orders placed or performed during the hospital encounter of 11/23/23   CT Abdomen Pelvis w Contrast    Narrative    PROCEDURE:  CT ABDOMEN PELVIS W CONTRAST    HISTORY: abd pain, h/o SBO    TECHNIQUE: Helical CT of the abdomen and pelvis was performed  following injection of intravenous contrast. This CT exam was  performed using one or more the following dose reduction techniques:  automated exposure control, adjustment of the mA and/or kV according  to patient size, and/or iterative reconstruction technique.    COMPARISON: None.    MEDS/CONTRAST: ISOVUE 370, 136mL    FINDINGS:      Limited images through the lung bases demonstrate no focal  consolidation or mass.     The liver demonstrates probable steatosis. The gallbladder is  surgically absent. The  spleen, pancreas and adrenal glands are  unremarkable. Symmetric nephrograms are present without  hydronephrosis. The aorta is normal in size with scattered  atherosclerotic calcifications.    There is distention of the stomach and proximal mid small bowel with  fluid. A focal transition point is questioned in the anterior abdomen  on series 2 image 70 and series 3 image 51. The distal small bowel is  decompressed. Some fluid is seen in the colon. No free air is present.  No hypoenhancing bowel, SMA or SMV thrombosis is seen. Small amount of  free fluid is present.    No suspicious osseous lesions are identified.      Impression    IMPRESSION:      Findings suspicious for mid small bowel obstruction, transition point  in the anterior abdomen most likely on the basis of adhesive disease.    MIROSLAVA NOYOLA MD         SYSTEM ID:  [id number]   XR Chest Port 1 View    Narrative    PROCEDURE:  XR CHEST PORT 1 VIEW    HISTORY: hypoxia. .    COMPARISON:  None.    FINDINGS:  An enteric tube is partially visualized.  The cardiomediastinal contours are enlarged.  Patchy airspace opacity is questioned in the right midlung. No  effusion or pneumothorax.      Impression    IMPRESSION:  Limited portable penetration. Question focal infiltrate  right midlung.  Cardiomegaly.    MIROSLAVA NOYOLA MD         SYSTEM ID:  [id number]   XR Chest Port 1 View    Narrative    PROCEDURE INFORMATION:   Exam: XR Chest   Exam date and time: 11/24/2023 3:58 AM   Age: 58 years old   Clinical indication: Device placement; Ng tube; Additional info: Ng replacement     TECHNIQUE:   Imaging protocol: Radiologic exam of the chest.   Views: 1 view.     COMPARISON:   CR XR CHEST PORT 1 VIEW 11/23/2023 7:06 PM     FINDINGS:   Tubes, catheters and devices: Nasogastric tube is in place. The tip of the   nasogastric tube is not included on this examination.     Lungs: Unremarkable. No consolidation.   Pleural spaces: Unremarkable. No pleural effusion.  No pneumothorax.   Heart/Mediastinum:  Mild cardiomegaly.   Bones/joints: Unremarkable.   Examination limited by patient obesity.       Impression    IMPRESSION:   Nasogastric tube is in place. The tip of the nasogastric tube is not included   on this examination.     THIS DOCUMENT HAS BEEN ELECTRONICALLY SIGNED BY SO GRESHAM MD   Echocardiogram Complete     Value    LVEF  55-60%    Narrative    [id number]  [id number]  [id number]  [id number]^MARILIA^ALVARO^L     [facility]  [address] [place] [address]     Name: KATHLEEN CERVANTES  MRN: [id number]  : 1965  Study Date: 2023 10:17 AM  Age: 58 yrs  Gender: Female  Patient Location: [facility]  Reason For Study: Native Valve Disease  Ordering Physician: ALVARO CAPELLAN  Performed By: Marquita Bryant     BSA: 2.1 m2  Height: 64 in  Weight: 244 lb  HR: 76  BP: 102/63 mmHg  ______________________________________________________________________________  Procedure  Complete Portable Echo Adult. Contrast Definity. Definity (NDC #06335-706-89)  given intravenously. Patient was given 3ml mixture of 1.5ml Definity and 8.5ml  saline. 7 ml wasted. Definity Lot # [id number] .  ______________________________________________________________________________  Interpretation Summary  Global and regional left ventricular function is normal with an EF of 55-60%.  Global right ventricular function is normal.     Severe rheumatic mitral stenosis is present (known from prior studies). MG is  10 mmHg, MVA is 1.3 cm2.     Pulmonary artery systolic pressure cannot be assessed.     Previous study not available in our system for comparison.  ______________________________________________________________________________  Left Ventricle  Left ventricular size is normal. Left ventricular wall thickness is normal.  Global and regional left ventricular function is normal with an EF of 55-60%.  Diastolic function not assessed due to arrhythmia.     Right  Ventricle  The right ventricle is normal size. Global right ventricular function is  normal.     Atria  The atria cannot be assessed.     Mitral Valve  Rheumatic mitral valve disease is present with moderate calcification. Severe  mitral stenosis is present. The etiology of the mitral stenosis is rheumatic .  The mean mitral valve gradient is 10.0 mmHg. The mitral valve area is 1.3  cm^2.     Aortic Valve  The valve leaflets are not well visualized. Mild aortic valve calcification is  present. On Doppler interrogation, there is no significant stenosis or  regurgitation.     Tricuspid Valve  The valve leaflets are not well visualized. The peak velocity of the tricuspid  regurgitant jet is not obtainable. Pulmonary artery systolic pressure cannot  be assessed.     Pulmonic Valve  The pulmonic valve cannot be assessed.     Vessels  The aorta root is normal. The thoracic aorta is normal. The inferior vena cava  was normal in size with preserved respiratory variability.     Pericardium  No pericardial effusion is present.     Compared to Previous Study  Previous study not available for comparison.     ______________________________________________________________________________  MMode/2D Measurements & Calculations  IVSd: 1.2 cm  LVIDd: 5.6 cm  LVIDs: 4.0 cm  LVPWd: 1.1 cm  FS: 28.8 %     LV mass(C)d: 260.8 grams  LV mass(C)dI: 122.5 grams/m2  Ao root diam: 3.4 cm  asc Aorta Diam: 3.8 cm  LVOT diam: 2.0 cm  LVOT area: 3.1 cm2  Ao root diam index Ht(cm/m): 2.1  Ao root diam index BSA (cm/m2): 1.6  Asc Ao diam index BSA (cm/m2): 1.8  Asc Ao diam index Ht(cm/m): 2.3  RWT: 0.41  TAPSE: 1.8 cm     Doppler Measurements & Calculations  MV E max alli: 106.0 cm/sec  MV A max alli: 164.0 cm/sec  MV E/A: 0.65  MV max P.1 mmHg  MV mean PG: 10.0 mmHg  MV V2 VTI: 78.5 cm  MVA(VTI): 0.60 cm2  MV P1/2t max alli: 224.0 cm/sec  MV P1/2t: 172.7 msec  MVA(P1/2t): 1.3 cm2  MV dec slope: 380.0 cm/sec2  MV dec time: 0.29 sec  Ao V2 max:  189.0 cm/sec  Ao max P.0 mmHg  Ao V2 mean: 128.3 cm/sec  Ao mean P.0 mmHg  Ao V2 VTI: 27.5 cm  MYNOR(I,D): 1.7 cm2  MYNOR(V,D): 1.9 cm2     LV V1 max P.3 mmHg  LV V1 max: 115.0 cm/sec  LV V1 VTI: 15.1 cm  SV(LVOT): 47.4 ml  SI(LVOT): 22.3 ml/m2  PA acc time: 0.11 sec  AV Marlo Ratio (DI): 0.61  MYNOR Index (cm2/m2): 0.81  E/E' av.6  Lateral E/e': 13.4  Medial E/e': 19.9     ______________________________________________________________________________  Report approved by: Shanna CHÁVEZ 2023 11:20 AM             Discharge Medications   Current Discharge Medication List        CONTINUE these medications which have NOT CHANGED    Details   acetaminophen (TYLENOL) 650 MG CR tablet Take 650 mg by mouth every 8 hours as needed for pain      albuterol (PROAIR HFA/PROVENTIL HFA/VENTOLIN HFA) 108 (90 Base) MCG/ACT inhaler Inhale 1-2 puffs into the lungs every 6 hours as needed for shortness of breath, wheezing or cough      benzonatate (TESSALON) 200 MG capsule Take 200 mg by mouth 3 times daily as needed for cough      calcium carbonate-vitamin D (CALTRATE) 600-10 MG-MCG per tablet Take 1 tablet by mouth 2 times daily      coenzyme Q-10 200 MG CAPS Take 200 mg by mouth at bedtime      docusate sodium (COLACE) 100 MG capsule Take 100 mg by mouth 2 times daily as needed for constipation      ELIQUIS ANTICOAGULANT 5 MG tablet Take 5 mg by mouth 2 times daily      furosemide (LASIX) 40 MG tablet Take 40 mg by mouth daily      gabapentin (NEURONTIN) 600 MG tablet Take 1,200 mg by mouth 3 times daily      glucose (BD GLUCOSE) 4 g chewable tablet Take 1 tablet by mouth every hour as needed for low blood sugar      guaiFENesin-codeine (ROBITUSSIN AC) 100-10 MG/5ML solution Take 5 mLs by mouth every 4 hours as needed for cough      insulin lispro (HUMALOG KWIKPEN) 100 UNIT/ML (1 unit dial) KWIKPEN Inject 15 Units into the skin with meals at breakfast and Lunch, Inject 20 Units at supper      ipratropium -  albuterol 0.5 mg/2.5 mg/3 mL (DUONEB) 0.5-2.5 (3) MG/3ML neb solution Take 1 vial by nebulization 4 times daily      JANUMET  MG tablet Take 1 tablet by mouth 2 times daily (with meals)      LANTUS SOLOSTAR 100 UNIT/ML soln Inject 35 Units Subcutaneous at bedtime      losartan (COZAAR) 25 MG tablet Take 12.5 mg by mouth daily      magnesium oxide (MAG-OX) 400 MG tablet Take 400 mg by mouth daily      meloxicam (MOBIC) 7.5 MG tablet Take 7.5 mg by mouth daily With food      metoprolol succinate ER (TOPROL XL) 25 MG 24 hr tablet Take 1 tablet by mouth daily      mirtazapine (REMERON) 45 MG tablet Take 45 mg by mouth at bedtime      montelukast (SINGULAIR) 10 MG tablet Take 10 mg by mouth at bedtime      nicotine (COMMIT) 2 MG lozenge Place 2 mg inside cheek every hour as needed for smoking cessation      omeprazole 20 MG tablet Take 20 mg by mouth 2 times daily (with meals)      ondansetron (ZOFRAN) 4 MG tablet Take 4 mg by mouth every 8 hours as needed      OXcarbazepine (TRILEPTAL) 150 MG tablet Take 300 mg by mouth 2 times daily      polyethylene glycol (MIRALAX) 17 g packet Take 1 packet by mouth once as needed for constipation      potassium chloride ER (K-TAB/KLOR-CON) 10 MEQ CR tablet Take 1 tablet by mouth 2 times daily      predniSONE (DELTASONE) 10 MG tablet Take 30 mg by mouth daily      !! QUEtiapine (SEROQUEL) 100 MG tablet Take 100 mg by mouth daily      !! QUEtiapine (SEROQUEL) 400 MG tablet Take 400 mg by mouth at bedtime      !! QUEtiapine (SEROQUEL) 50 MG tablet Take 50 mg by mouth 3 times daily as needed (Insomia)      risperiDONE (RISPERDAL) 2 MG tablet Take 5 mg by mouth at bedtime      roflumilast (DALIRESP) 500 MCG TABS tablet Take 500 mcg by mouth daily      rOPINIRole (REQUIP) 0.5 MG tablet Take 0.5 mg by mouth at bedtime      RYBELSUS 14 MG tablet Take 1 tablet by mouth daily      spironolactone (ALDACTONE) 25 MG tablet Take 25 mg by mouth 2 times daily       sulfamethoxazole-trimethoprim (BACTRIM DS) 800-160 MG tablet Take 1 Tablet by mouth every Monday, Wednesday, and Friday While on High dose Prednisone      SUMAtriptan (IMITREX) 25 MG tablet Take 25 mg by mouth every 2 hours as needed for migraine (Maximum 8 Tablets daily)      SYMBICORT 160-4.5 MCG/ACT Inhaler Inhale 2 puffs into the lungs two times daily Rinse Mouth after Each Use      tiotropium (SPIRIVA RESPIMAT) 2.5 MCG/ACT inhaler Inhale 2 puffs into the lungs daily      tiZANidine (ZANAFLEX) 4 MG tablet Take 4 mg by mouth every 12 hours as needed for muscle spasms      topiramate (TOPAMAX) 25 MG tablet Take 25 mg by mouth 2 times daily      traMADol (ULTRAM) 50 MG tablet Take 50 mg by mouth every 6 hours as needed for severe pain      varenicline (CHANTIX) 1 MG tablet Take 1 mg by mouth 2 times daily      zolpidem (AMBIEN) 10 MG tablet Take 10 mg by mouth nightly as needed for sleep      SM ASPIRIN LOW DOSE 81 MG EC tablet Take 81 mg by mouth daily       !! - Potential duplicate medications found. Please discuss with provider.        Allergies   Allergies   Allergen Reactions    Adhesive Tape Hives and Rash    Bisoprolol Other (See Comments)     SOB, was hospitalized     SOB, was hospitalized    Celecoxib Nausea and Vomiting and Other (See Comments)     upset stomach    Topiramate Other (See Comments)

## 2023-11-25 NOTE — PLAN OF CARE
Pt is A&Ox4 and vitally stable on 1L NC. I did have to give her one dose of PRN metoprolol at 0144; along with her scheduled doses because of ST anywhere from (100-110s). NG is still clamped with no complaints of N/V. Pt was going to leave AMA at 0300 because we didn't get her ambien.  Talked to her and convinced her to stay. She still has heparin running at 1500 units/hr recheck at 1230.        Problem: Dysrhythmia  Goal: Normalized Cardiac Rhythm  Outcome: Progressing     Problem: Adult Inpatient Plan of Care  Goal: Optimal Comfort and Wellbeing  Outcome: Progressing  Intervention: Monitor Pain and Promote Comfort  Recent Flowsheet Documentation  Taken 11/25/2023 0400 by Gerson Larson, RN  Pain Management Interventions: medication (see MAR)  Taken 11/25/2023 0000 by eGrson Larson, RN  Pain Management Interventions: medication (see MAR)  Taken 11/24/2023 2000 by Gerson Larson, RN  Pain Management Interventions: medication (see MAR)  Intervention: Provide Person-Centered Care  Recent Flowsheet Documentation  Taken 11/24/2023 2000 by Gerson Larson, RN  Trust Relationship/Rapport:   care explained   choices provided

## 2023-11-25 NOTE — PROGRESS NOTES
Patient is currently on 1L Nasal Cannula with Sp02 92%.  Patient refused DuoNeb treatments and states that she will ask for one if she feels like she needs it.  RN and provider updated.

## 2023-11-25 NOTE — PHARMACY - DISCHARGE MEDICATION RECONCILIATION AND EDUCATION
Pharmacy:  Discharge Counseling and Medication Reconciliation    Holly Baez  [address]  [place] [address]  [phone] (home)   58 year old female  PCP: No Ref-Primary, Physician    Allergies: Adhesive tape, Bisoprolol, Celecoxib, and Topiramate    Discharge Counseling:    Pharmacist met with patient (and/or family) today to review the medication portion of the After Visit Summary (with an emphasis on NEW medications) and to address patient's questions/concerns.    Summary of Education: No Education provided since patient stated that she did not needed any addition education and no new medication was prescribed     Materials Provided:  MedCounselor sheets printed from Clinical Pharmacology on: None    Discharge Medication Reconciliation:    It has been determined that the patient has an adequate supply of medications available or which can be obtained from the patient's preferred pharmacy, which SHE has confirmed as: [facility] [An updated medication list will be faxed to the patient's pharmacy.]    Thank you for the consult.    Samir Whyte [facility]........November 25, 2023 7:51 AM

## 2023-11-26 ENCOUNTER — HOSPITAL ENCOUNTER (EMERGENCY)
Dept: HOSPITAL 60 - LB.ED | Age: 58
Discharge: NURSING, REHAB OR LONG-TERM CARE | End: 2023-11-26
Payer: MEDICARE

## 2023-11-26 DIAGNOSIS — Z88.4: ICD-10-CM

## 2023-11-26 DIAGNOSIS — Z79.01: ICD-10-CM

## 2023-11-26 DIAGNOSIS — Z88.8: ICD-10-CM

## 2023-11-26 DIAGNOSIS — R09.02: ICD-10-CM

## 2023-11-26 DIAGNOSIS — E11.40: ICD-10-CM

## 2023-11-26 DIAGNOSIS — Z88.0: ICD-10-CM

## 2023-11-26 DIAGNOSIS — E66.9: ICD-10-CM

## 2023-11-26 DIAGNOSIS — Z79.4: ICD-10-CM

## 2023-11-26 DIAGNOSIS — R79.89: ICD-10-CM

## 2023-11-26 DIAGNOSIS — Z88.1: ICD-10-CM

## 2023-11-26 DIAGNOSIS — I11.9: Primary | ICD-10-CM

## 2023-11-26 DIAGNOSIS — Z91.048: ICD-10-CM

## 2023-11-26 DIAGNOSIS — F17.210: ICD-10-CM

## 2023-11-26 DIAGNOSIS — Z79.899: ICD-10-CM

## 2023-11-26 DIAGNOSIS — J43.9: ICD-10-CM

## 2023-11-26 DIAGNOSIS — K21.9: ICD-10-CM

## 2023-11-26 DIAGNOSIS — Z20.822: ICD-10-CM

## 2023-11-26 DIAGNOSIS — Z90.49: ICD-10-CM

## 2023-11-26 LAB
ALBUMIN SERPL-MCNC: 3.1 G/DL (ref 3.4–5)
ALBUMIN/GLOB SERPL: 1.2 {RATIO} (ref 0.8–2)
ALP SERPL-CCNC: 84 U/L (ref 46–116)
ALT SERPL-CCNC: 46 U/L (ref 12–78)
ANION GAP SERPL CALC-SCNC: 14.8 MMOL/L (ref 5–15)
APPEARANCE UR: (no result)
AST SERPL-CCNC: 23 U/L (ref 15–37)
BASE EXCESS BLDA CALC-SCNC: -1.6 MMOL/L (ref -2–2)
BASOPHILS # BLD AUTO: 0.02 K/UL (ref 0.02–0.1)
BASOPHILS NFR BLD AUTO: 0.2 % (ref 0–0.5)
BILIRUB SERPL-MCNC: 0.8 MG/DL (ref 0–1)
BILIRUB UR STRIP-MCNC: NEGATIVE MG/DL
BUN SERPL-MCNC: 12 MG/DL (ref 8–26)
BUN/CREAT SERPL: 12.6 (ref 6–25)
CALCIUM SERPL-MCNC: 8.2 MG/DL (ref 8.5–10.1)
CHLORIDE SERPL-SCNC: 99 MMOL/L (ref 98–107)
CO2 SERPL-SCNC: 25.7 MMOL/L (ref 21–32)
COLOR UR: YELLOW
CREAT CL 24H UR+SERPL-VRATE: 55.74 ML/MIN
CREAT SERPL-MCNC: 0.95 MG/DL (ref 0.55–1.02)
EOSINOPHIL # BLD AUTO: 0.04 K/UL (ref 0.04–0.4)
EOSINOPHIL NFR BLD AUTO: 0.5 % (ref 1–5)
ERYTHROCYTE [DISTWIDTH] IN BLOOD BY AUTOMATED COUNT: 14.6 % (ref 11–16)
FINE GRAN CASTS #/AREA URNS LPF: (no result) /HPF
FLUAV RNA UPPER RESP QL NAA+PROBE: NEGATIVE
FLUBV RNA UPPER RESP QL NAA+PROBE: NEGATIVE
GLOBULIN SER-MCNC: 2.5 G/DL (ref 2.2–4.2)
GLUCOSE SERPL-MCNC: 97 MG/DL (ref 74–100)
GLUCOSE UR STRIP-MCNC: NEGATIVE MG/DL
HCO3 BLDA-SCNC: 23.4 MMOL/L (ref 22–26)
HCT VFR BLD AUTO: 42.7 % (ref 37–47)
HGB BLD-MCNC: 14 G/DL (ref 11.5–16.5)
KETONES UR STRIP-MCNC: NEGATIVE MG/DL
LYMPHOCYTES # BLD AUTO: 0.58 K/UL (ref 1.5–4)
LYMPHOCYTES NFR BLD AUTO: 7.2 % (ref 20–40)
MAGNESIUM SERPL-MCNC: 1.8 MG/DL (ref 1.8–2.4)
MCH RBC QN AUTO: 31.2 PG (ref 27–32)
MCHC RBC AUTO-ENTMCNC: 32.8 G/DL (ref 31–35)
MCHC RBC AUTO-ENTMCNC: 95 FL (ref 76–96)
MONOCYTES # BLD AUTO: 0.79 K/UL (ref 0.2–0.8)
MONOCYTES NFR BLD AUTO: 9.9 % (ref 3–10)
NEUTROPHILS # BLD AUTO: 6.59 K/UL (ref 2–7.5)
NEUTROPHILS NFR BLD AUTO: 82.2 % (ref 45–70)
NITRITE UR QL: NEGATIVE
PCO2 BLDA: 38.4 MMHG (ref 35–45)
PH UR STRIP: 6.5 [PH] (ref 5–8)
PLATELET # BLD AUTO: 162 K/UL (ref 150–500)
PMV BLD AUTO: 11.1 FL (ref 6–10)
PO2 BLDA: 74.9 MMHG (ref 80–105)
POTASSIUM SERPL-SCNC: 3.5 MMOL/L (ref 3.5–5.1)
PROT SERPL-MCNC: 5.6 G/DL (ref 6.4–8.2)
PROT UR STRIP-MCNC: (no result) MG/DL
RBC # BLD AUTO: 4.49 M/UL (ref 3.8–5.8)
RBC # URNS HPF: (no result) /HPF
RBC UR QL: NEGATIVE
RSV RNA UPPER RESP QL NAA+PROBE: NEGATIVE
SAO2 % BLDA: 94.8 % (ref 95–98)
SARS-COV-2 RNA RESP QL NAA+PROBE: NEGATIVE
SODIUM SERPL-SCNC: 136 MMOL/L (ref 136–145)
SP GR UR STRIP: 1.01 (ref 1–1.03)
SQUAMOUS #/AREA URNS HPF: (no result) /HPF
UROBILINOGEN UR STRIP-ACNC: 1 E.U./DL (ref 0.2–1)
WBC # BLD AUTO: 8 K/UL (ref 4–11)
WBC UR QL: (no result) /HPF

## 2023-11-26 PROCEDURE — A0429 BLS-EMERGENCY: HCPCS

## 2023-11-26 PROCEDURE — A0425 GROUND MILEAGE: HCPCS

## 2023-11-26 RX ADMIN — NITROGLYCERIN PRN MG: 0.4 TABLET SUBLINGUAL at 12:15

## 2023-11-26 RX ADMIN — NITROGLYCERIN PRN MG: 0.4 TABLET SUBLINGUAL at 12:32

## 2023-11-27 VITALS — DIASTOLIC BLOOD PRESSURE: 68 MMHG | SYSTOLIC BLOOD PRESSURE: 148 MMHG | HEART RATE: 114 BPM

## 2023-11-27 NOTE — TELEPHONE ENCOUNTER
Patient has PCP elsewhere, no follow-up here. No TCM call required per policy.  Susan Robles RN on 11/27/2023 at 8:11 AM

## 2023-12-11 ENCOUNTER — HOSPITAL ENCOUNTER (EMERGENCY)
Dept: HOSPITAL 60 - LB.ED | Age: 58
Discharge: HOME | End: 2023-12-11
Payer: MEDICARE

## 2023-12-11 VITALS — DIASTOLIC BLOOD PRESSURE: 57 MMHG | HEART RATE: 63 BPM | SYSTOLIC BLOOD PRESSURE: 97 MMHG

## 2023-12-11 DIAGNOSIS — Z88.1: ICD-10-CM

## 2023-12-11 DIAGNOSIS — Z79.01: ICD-10-CM

## 2023-12-11 DIAGNOSIS — Z88.6: ICD-10-CM

## 2023-12-11 DIAGNOSIS — Z88.8: ICD-10-CM

## 2023-12-11 DIAGNOSIS — K21.9: ICD-10-CM

## 2023-12-11 DIAGNOSIS — Z88.5: ICD-10-CM

## 2023-12-11 DIAGNOSIS — Z79.82: ICD-10-CM

## 2023-12-11 DIAGNOSIS — Z91.048: ICD-10-CM

## 2023-12-11 DIAGNOSIS — E11.40: ICD-10-CM

## 2023-12-11 DIAGNOSIS — I48.91: ICD-10-CM

## 2023-12-11 DIAGNOSIS — K59.00: Primary | ICD-10-CM

## 2023-12-11 DIAGNOSIS — E66.9: ICD-10-CM

## 2023-12-11 DIAGNOSIS — J44.9: ICD-10-CM

## 2023-12-11 DIAGNOSIS — Z79.4: ICD-10-CM

## 2023-12-11 DIAGNOSIS — Z79.84: ICD-10-CM

## 2023-12-11 DIAGNOSIS — Z88.0: ICD-10-CM

## 2023-12-11 DIAGNOSIS — I10: ICD-10-CM

## 2023-12-11 LAB
ALBUMIN SERPL-MCNC: 3.2 G/DL (ref 3.4–5)
ALBUMIN/GLOB SERPL: 0.9 {RATIO} (ref 0.8–2)
ALP SERPL-CCNC: 102 U/L (ref 46–116)
ALT SERPL-CCNC: 44 U/L (ref 12–78)
ANION GAP SERPL CALC-SCNC: 12.6 MMOL/L (ref 5–15)
APPEARANCE UR: CLEAR
AST SERPL-CCNC: 19 U/L (ref 15–37)
BACTERIA URNS QL MICRO: (no result) /HPF
BASOPHILS # BLD AUTO: 0.04 K/UL (ref 0.02–0.1)
BASOPHILS NFR BLD AUTO: 0.3 % (ref 0–0.5)
BILIRUB SERPL-MCNC: 0.5 MG/DL (ref 0–1)
BILIRUB UR STRIP-MCNC: NEGATIVE MG/DL
BUN SERPL-MCNC: 18 MG/DL (ref 8–26)
BUN/CREAT SERPL: 14 (ref 6–25)
CALCIUM SERPL-MCNC: 9.1 MG/DL (ref 8.5–10.1)
CHLORIDE SERPL-SCNC: 91 MMOL/L (ref 98–107)
CO2 SERPL-SCNC: 30.6 MMOL/L (ref 21–32)
COLOR UR: YELLOW
CREAT CL 24H UR+SERPL-VRATE: 41.05 ML/MIN
CREAT SERPL-MCNC: 1.29 MG/DL (ref 0.55–1.02)
EOSINOPHIL # BLD AUTO: 0.08 K/UL (ref 0.04–0.4)
EOSINOPHIL NFR BLD AUTO: 0.5 % (ref 1–5)
ERYTHROCYTE [DISTWIDTH] IN BLOOD BY AUTOMATED COUNT: 13.7 % (ref 11–16)
GLOBULIN SER-MCNC: 3.6 G/DL (ref 2.2–4.2)
GLUCOSE SERPL-MCNC: 74 MG/DL (ref 74–100)
GLUCOSE UR STRIP-MCNC: NEGATIVE MG/DL
HCT VFR BLD AUTO: 40.9 % (ref 37–47)
HGB BLD-MCNC: 13.6 G/DL (ref 11.5–16.5)
INR PPP: 1.5 (ref 1–3.5)
KETONES UR STRIP-MCNC: NEGATIVE MG/DL
LYMPHOCYTES # BLD AUTO: 1.44 K/UL (ref 1.5–4)
LYMPHOCYTES NFR BLD AUTO: 9.8 % (ref 20–40)
MCH RBC QN AUTO: 30.6 PG (ref 27–32)
MCHC RBC AUTO-ENTMCNC: 33.3 G/DL (ref 31–35)
MCHC RBC AUTO-ENTMCNC: 92 FL (ref 76–96)
MONOCYTES # BLD AUTO: 1.17 K/UL (ref 0.2–0.8)
MONOCYTES NFR BLD AUTO: 8 % (ref 3–10)
NEUTROPHILS # BLD AUTO: 11.98 K/UL (ref 2–7.5)
NEUTROPHILS NFR BLD AUTO: 81.4 % (ref 45–70)
NITRITE UR QL: NEGATIVE
PH UR STRIP: 6 [PH] (ref 5–8)
PLATELET # BLD AUTO: 257 K/UL (ref 150–500)
PMV BLD AUTO: 10.8 FL (ref 6–10)
POTASSIUM SERPL-SCNC: 3.2 MMOL/L (ref 3.5–5.1)
PROT SERPL-MCNC: 6.8 G/DL (ref 6.4–8.2)
PROT UR STRIP-MCNC: (no result) MG/DL
PROTHROMBIN TIME: 15.5 SEC (ref 9–11.5)
RBC # BLD AUTO: 4.44 M/UL (ref 3.8–5.8)
RBC # URNS HPF: (no result) /HPF
RBC UR QL: NEGATIVE
RENAL EPI CELLS #/AREA URNS HPF: (no result) /HPF
SODIUM SERPL-SCNC: 131 MMOL/L (ref 136–145)
SP GR UR STRIP: 1.02 (ref 1–1.03)
SQUAMOUS #/AREA URNS HPF: (no result) /HPF
UROBILINOGEN UR STRIP-ACNC: 0.2 E.U./DL (ref 0.2–1)
WBC # BLD AUTO: 14.7 K/UL (ref 4–11)
WBC CLUMPS #/AREA URNS HPF: (no result) /HPF
WBC UR QL: (no result) /HPF

## 2023-12-11 RX ADMIN — LACTULOSE ONE GM: 10 SOLUTION ORAL at 13:46

## 2023-12-12 ENCOUNTER — HOSPITAL ENCOUNTER (EMERGENCY)
Dept: HOSPITAL 60 - LB.ED | Age: 58
Discharge: HOME | End: 2023-12-12
Payer: MEDICARE

## 2023-12-12 VITALS — DIASTOLIC BLOOD PRESSURE: 49 MMHG | HEART RATE: 58 BPM | SYSTOLIC BLOOD PRESSURE: 97 MMHG

## 2023-12-12 DIAGNOSIS — Z79.82: ICD-10-CM

## 2023-12-12 DIAGNOSIS — Z79.899: ICD-10-CM

## 2023-12-12 DIAGNOSIS — Z91.048: ICD-10-CM

## 2023-12-12 DIAGNOSIS — J44.9: ICD-10-CM

## 2023-12-12 DIAGNOSIS — K21.9: ICD-10-CM

## 2023-12-12 DIAGNOSIS — Z88.8: ICD-10-CM

## 2023-12-12 DIAGNOSIS — Z79.4: ICD-10-CM

## 2023-12-12 DIAGNOSIS — E11.40: ICD-10-CM

## 2023-12-12 DIAGNOSIS — K59.00: Primary | ICD-10-CM

## 2023-12-12 DIAGNOSIS — Z79.02: ICD-10-CM

## 2023-12-12 DIAGNOSIS — I48.91: ICD-10-CM

## 2023-12-12 DIAGNOSIS — E66.9: ICD-10-CM

## 2023-12-12 DIAGNOSIS — Z88.1: ICD-10-CM

## 2023-12-12 DIAGNOSIS — Z88.4: ICD-10-CM

## 2023-12-12 DIAGNOSIS — Z88.0: ICD-10-CM

## 2023-12-12 DIAGNOSIS — Z90.710: ICD-10-CM

## 2023-12-12 DIAGNOSIS — Z79.01: ICD-10-CM

## 2023-12-12 DIAGNOSIS — I10: ICD-10-CM

## (undated) RX ORDER — MAGNESIUM SULFATE HEPTAHYDRATE 40 MG/ML
INJECTION, SOLUTION INTRAVENOUS
Status: DISPENSED
Start: 2023-01-01

## (undated) RX ORDER — ONDANSETRON 2 MG/ML
INJECTION INTRAMUSCULAR; INTRAVENOUS
Status: DISPENSED
Start: 2023-01-01

## (undated) RX ORDER — FENTANYL CITRATE 50 UG/ML
INJECTION, SOLUTION INTRAMUSCULAR; INTRAVENOUS
Status: DISPENSED
Start: 2023-01-01

## (undated) RX ORDER — METOPROLOL TARTRATE 1 MG/ML
INJECTION, SOLUTION INTRAVENOUS
Status: DISPENSED
Start: 2023-01-01

## (undated) RX ORDER — METOCLOPRAMIDE HYDROCHLORIDE 5 MG/ML
INJECTION INTRAMUSCULAR; INTRAVENOUS
Status: DISPENSED
Start: 2023-01-01

## (undated) RX ORDER — DIPHENHYDRAMINE HYDROCHLORIDE 50 MG/ML
INJECTION INTRAMUSCULAR; INTRAVENOUS
Status: DISPENSED
Start: 2023-01-01

## (undated) RX ORDER — CEFEPIME HYDROCHLORIDE 2 G/1
INJECTION, POWDER, FOR SOLUTION INTRAVENOUS
Status: DISPENSED
Start: 2023-01-01